# Patient Record
Sex: FEMALE | Race: WHITE | Employment: UNEMPLOYED | URBAN - METROPOLITAN AREA
[De-identification: names, ages, dates, MRNs, and addresses within clinical notes are randomized per-mention and may not be internally consistent; named-entity substitution may affect disease eponyms.]

---

## 2017-01-18 ENCOUNTER — HOSPITAL ENCOUNTER (EMERGENCY)
Age: 52
Discharge: HOME | End: 2017-01-19
Attending: EMERGENCY MEDICINE
Payer: SUBSIDIZED

## 2017-01-18 DIAGNOSIS — F10.920 ALCOHOL INTOXICATION, UNCOMPLICATED (HCC): Primary | ICD-10-CM

## 2017-01-18 PROCEDURE — 99283 EMERGENCY DEPT VISIT LOW MDM: CPT

## 2017-01-19 VITALS
DIASTOLIC BLOOD PRESSURE: 82 MMHG | OXYGEN SATURATION: 96 % | RESPIRATION RATE: 18 BRPM | SYSTOLIC BLOOD PRESSURE: 125 MMHG | TEMPERATURE: 98.2 F | HEART RATE: 90 BPM

## 2017-01-19 NOTE — ED NOTES
Pt provided a healthy choice meal, jello, william mist, crackers, and peanut butter at this time per request. Pt sitting up alert eating in no apparent distress.

## 2017-01-19 NOTE — ED PROVIDER NOTES
HPI Comments: 48yoF to ED for alcohol intoxication. Per EMS personnel, pt presented to a shelter intoxicated. She is currently sleeping, awakens to voice but goes back to sleep. No history able to be obtained at this time due to alcohol intoxication. Patient is a 45 y.o. female presenting with intoxication. The history is provided by the patient. Alcohol intoxication   Primary symptoms include: intoxication. Past Medical History:   Diagnosis Date    Hypertension     Other ill-defined conditions(799.89)      high cholesterol    Psychiatric disorder        History reviewed. No pertinent past surgical history. Family History:   Problem Relation Age of Onset    Stroke Mother     Diabetes Mother     Hypertension Father     Diabetes Father     Cancer Paternal Uncle        Social History     Social History    Marital status:      Spouse name: N/A    Number of children: N/A    Years of education: N/A     Occupational History    Not on file. Social History Main Topics    Smoking status: Never Smoker    Smokeless tobacco: Never Used    Alcohol use Yes    Drug use: No    Sexual activity: No     Other Topics Concern    Not on file     Social History Narrative         ALLERGIES: Sulfa (sulfonamide antibiotics)    Review of Systems   Unable to perform ROS: Acuity of condition   acute alcohol intoxication    Vitals:    01/18/17 2001   BP: 137/86   Pulse: 79   Resp: 18   Temp: 97.1 °F (36.2 °C)   SpO2: 97%            Physical Exam   Constitutional: Vital signs are normal. She appears well-developed and well-nourished. She is sleeping. She is easily aroused. No distress. Sleeping soundly, awakens to voice; +strong ETOH smell. HENT:   Head: Normocephalic and atraumatic. Right Ear: External ear normal.   Left Ear: External ear normal.   Nose: Nose normal.   Eyes: Conjunctivae are normal.   Cardiovascular: Normal rate and regular rhythm.     Pulmonary/Chest: Effort normal and breath sounds normal.   Abdominal: Soft. Musculoskeletal: Normal range of motion. Neurological: She is easily aroused. Skin: Skin is warm and dry. No rash noted. Psychiatric: She has a normal mood and affect. MDM  Number of Diagnoses or Management Options  Alcohol intoxication, uncomplicated [facility]):   Diagnosis management comments: Pt intoxicated per EMS staff. Sleeping, responds to voice but goes back to sleep w/o talking. Will re-eval at a later time. BRIAN Reyes 10:36 PM  12:34 AM Pt continues to sleep, no distress. 1:54 AM Pt continues to sleep, no distress noted. 3:03 AM Pt ambulatory to the restroom with minimal assistance. Gait is steady. 3:27 AM Pt is alert, oriented. Ambulatory with steady gait. Stable for d/c when ride is here.     Risk of Complications, Morbidity, and/or Mortality  Presenting problems: moderate  Diagnostic procedures: minimal  Management options: minimal    Patient Progress  Patient progress: improved    ED Course       Procedures

## 2017-01-19 NOTE — ED TRIAGE NOTES
Patient received from EMS who were called to the local shelter by the police after this patient presented to the shelter but appears to be intoxicated, patient denies drinking but has a strong odor of alcohol on breath and is unable to ambulate with a steady gait she knows her name and date of birth.

## 2017-01-19 NOTE — ED NOTES
Patient apparently intoxicated and is a bit difficult to get information from she is asleep but easily awakened and easily aggitated by questions will review information provided when patient is less intoxicated.

## 2017-02-09 DIAGNOSIS — I10 ESSENTIAL HYPERTENSION: Primary | ICD-10-CM

## 2017-02-16 ENCOUNTER — TELEPHONE (OUTPATIENT)
Dept: FAMILY MEDICINE CLINIC | Age: 52
End: 2017-02-16

## 2017-02-16 ENCOUNTER — HOSPITAL ENCOUNTER (OUTPATIENT)
Dept: LAB | Age: 52
Discharge: HOME | End: 2017-02-16

## 2017-02-16 ENCOUNTER — LAB ONLY (OUTPATIENT)
Dept: FAMILY MEDICINE CLINIC | Age: 52
End: 2017-02-16

## 2017-02-16 DIAGNOSIS — I10 ESSENTIAL HYPERTENSION: Primary | ICD-10-CM

## 2017-02-16 DIAGNOSIS — I10 ESSENTIAL HYPERTENSION: ICD-10-CM

## 2017-02-16 LAB
ALBUMIN SERPL BCP-MCNC: 4 G/DL (ref 3.4–5)
ALBUMIN/GLOB SERPL: 1 {RATIO} (ref 0.8–1.7)
ALP SERPL-CCNC: 151 U/L (ref 45–117)
ALT SERPL-CCNC: 109 U/L (ref 13–56)
ANION GAP BLD CALC-SCNC: 10 MMOL/L (ref 3–18)
AST SERPL W P-5'-P-CCNC: 51 U/L (ref 15–37)
BILIRUB SERPL-MCNC: 0.4 MG/DL (ref 0.2–1)
BUN SERPL-MCNC: 10 MG/DL (ref 7–18)
BUN/CREAT SERPL: 13 (ref 12–20)
CALCIUM SERPL-MCNC: 9.4 MG/DL (ref 8.5–10.1)
CHLORIDE SERPL-SCNC: 102 MMOL/L (ref 100–108)
CHOLEST SERPL-MCNC: 182 MG/DL
CO2 SERPL-SCNC: 26 MMOL/L (ref 21–32)
CREAT SERPL-MCNC: 0.77 MG/DL (ref 0.6–1.3)
GLOBULIN SER CALC-MCNC: 4.1 G/DL (ref 2–4)
GLUCOSE SERPL-MCNC: 94 MG/DL (ref 74–99)
HDLC SERPL-MCNC: 68 MG/DL (ref 40–60)
HDLC SERPL: 2.7 {RATIO} (ref 0–5)
LDLC SERPL CALC-MCNC: 100.4 MG/DL (ref 0–100)
LIPID PROFILE,FLP: ABNORMAL
POTASSIUM SERPL-SCNC: 4.1 MMOL/L (ref 3.5–5.5)
PROT SERPL-MCNC: 8.1 G/DL (ref 6.4–8.2)
SODIUM SERPL-SCNC: 138 MMOL/L (ref 136–145)
TRIGL SERPL-MCNC: 68 MG/DL (ref ?–150)
VLDLC SERPL CALC-MCNC: 13.6 MG/DL

## 2017-02-16 PROCEDURE — 80053 COMPREHEN METABOLIC PANEL: CPT | Performed by: NURSE PRACTITIONER

## 2017-02-16 PROCEDURE — 80061 LIPID PANEL: CPT | Performed by: NURSE PRACTITIONER

## 2017-02-16 NOTE — TELEPHONE ENCOUNTER
Medication: Lovaza 1gm, dose: 2 caps, how often: BID, current number of medication days provided: 90, refill per application. Lot #: [id number], EXP 02/2018. This medication was received and verified for the following 1. Correct Patient, 2. Correct Diagnosis, 3. Correct Drug, 4. Correct route, and no current allergy to medication. Please contact patient to come  their medications.      Estephanie Espinoza MSN, RN, [facility]

## 2017-02-16 NOTE — PROGRESS NOTES
Patient name, date of birth and orders verified before specimen collection. Left arm is dry and intact. 23g butterfly  was utilized to collect specimen. Pt tolerated procedure well.

## 2017-02-20 ENCOUNTER — OFFICE VISIT (OUTPATIENT)
Dept: FAMILY MEDICINE CLINIC | Age: 52
End: 2017-02-20

## 2017-02-20 VITALS
HEART RATE: 100 BPM | WEIGHT: 171.6 LBS | HEIGHT: 61 IN | OXYGEN SATURATION: 99 % | RESPIRATION RATE: 20 BRPM | TEMPERATURE: 98.2 F | DIASTOLIC BLOOD PRESSURE: 83 MMHG | SYSTOLIC BLOOD PRESSURE: 136 MMHG | BODY MASS INDEX: 32.4 KG/M2

## 2017-02-20 DIAGNOSIS — E78.5 HYPERLIPIDEMIA LDL GOAL <130: ICD-10-CM

## 2017-02-20 DIAGNOSIS — F10.20 ALCOHOL USE DISORDER, SEVERE, DEPENDENCE (HCC): ICD-10-CM

## 2017-02-20 DIAGNOSIS — Z12.39 BREAST CANCER SCREENING: ICD-10-CM

## 2017-02-20 DIAGNOSIS — F41.9 ANXIETY: Primary | ICD-10-CM

## 2017-02-20 DIAGNOSIS — I10 ESSENTIAL HYPERTENSION: Primary | ICD-10-CM

## 2017-02-20 DIAGNOSIS — E66.9 OBESITY (BMI 30-39.9): ICD-10-CM

## 2017-02-20 DIAGNOSIS — R79.89 ELEVATED LFTS: ICD-10-CM

## 2017-02-20 DIAGNOSIS — Z00.00 ROUTINE HEALTH MAINTENANCE: ICD-10-CM

## 2017-02-20 DIAGNOSIS — F51.01 PRIMARY INSOMNIA: ICD-10-CM

## 2017-02-20 DIAGNOSIS — M79.89 LEG SWELLING: ICD-10-CM

## 2017-02-20 RX ORDER — TRAZODONE HYDROCHLORIDE 100 MG/1
100 TABLET ORAL
Qty: 30 TAB | Refills: 1 | Status: SHIPPED | OUTPATIENT
Start: 2017-02-20 | End: 2019-10-09

## 2017-02-20 RX ORDER — HYDROCHLOROTHIAZIDE 25 MG/1
25 TABLET ORAL DAILY
Qty: 30 TAB | Refills: 3 | Status: SHIPPED | OUTPATIENT
Start: 2017-02-20 | End: 2019-07-19

## 2017-02-20 NOTE — PATIENT INSTRUCTIONS
Anxiety Disorder: Care Instructions  Your Care Instructions  Anxiety is a normal reaction to stress. Difficult situations can cause you to have symptoms such as sweaty palms and a nervous feeling. In an anxiety disorder, the symptoms are far more severe. Constant worry, muscle tension, trouble sleeping, nausea and diarrhea, and other symptoms can make normal daily activities difficult or impossible. These symptoms may occur for no reason, and they can affect your work, school, or social life. Medicines, counseling, and self-care can all help. Follow-up care is a key part of your treatment and safety. Be sure to make and go to all appointments, and call your doctor if you are having problems. It's also a good idea to know your test results and keep a list of the medicines you take. How can you care for yourself at home? · Take medicines exactly as directed. Call your doctor if you think you are having a problem with your medicine. · Go to your counseling sessions and follow-up appointments. · Recognize and accept your anxiety. Then, when you are in a situation that makes you anxious, say to yourself, \"This is not an emergency. I feel uncomfortable, but I am not in danger. I can keep going even if I feel anxious. \"  · Be kind to your body:  ¨ Relieve tension with exercise or a massage. ¨ Get enough rest.  ¨ Avoid alcohol, caffeine, nicotine, and illegal drugs. They can increase your anxiety level and cause sleep problems. ¨ Learn and do relaxation techniques. See below for more about these techniques. · Engage your mind. Get out and do something you enjoy. Go to a funny movie, or take a walk or hike. Plan your day. Having too much or too little to do can make you anxious. · Keep a record of your symptoms. Discuss your fears with a good friend or family member, or join a support group for people with similar problems. Talking to others sometimes relieves stress.   · Get involved in social groups, or volunteer to help others. Being alone sometimes makes things seem worse than they are. · Get at least 30 minutes of exercise on most days of the week to relieve stress. Walking is a good choice. You also may want to do other activities, such as running, swimming, cycling, or playing tennis or team sports. Relaxation techniques  Do relaxation exercises 10 to 20 minutes a day. You can play soothing, relaxing music while you do them, if you wish. · Tell others in your house that you are going to do your relaxation exercises. Ask them not to disturb you. · Find a comfortable place, away from all distractions and noise. · Lie down on your back, or sit with your back straight. · Focus on your breathing. Make it slow and steady. · Breathe in through your nose. Breathe out through either your nose or mouth. · Breathe deeply, filling up the area between your navel and your rib cage. Breathe so that your belly goes up and down. · Do not hold your breath. · Breathe like this for 5 to 10 minutes. Notice the feeling of calmness throughout your whole body. As you continue to breathe slowly and deeply, relax by doing the following for another 5 to 10 minutes:  · Tighten and relax each muscle group in your body. You can begin at your toes and work your way up to your head. · Imagine your muscle groups relaxing and becoming heavy. · Empty your mind of all thoughts. · Let yourself relax more and more deeply. · Become aware of the state of calmness that surrounds you. · When your relaxation time is over, you can bring yourself back to alertness by moving your fingers and toes and then your hands and feet and then stretching and moving your entire body. Sometimes people fall asleep during relaxation, but they usually wake up shortly afterward. · Always give yourself time to return to full alertness before you drive a car or do anything that might cause an accident if you are not fully alert.  Never play a relaxation tape while you drive a car. When should you call for help? Call 911 anytime you think you may need emergency care. For example, call if:  · You feel you cannot stop from hurting yourself or someone else. Keep the numbers for these national suicide hotlines: 0-146-009-TALK (5-273.610.1675) and 9-128-FXUMBYN (0-173.931.3220). If you or someone you know talks about suicide or feeling hopeless, get help right away. Watch closely for changes in your health, and be sure to contact your doctor if:  · You have anxiety or fear that affects your life. · You have symptoms of anxiety that are new or different from those you had before. Where can you learn more? Go to http://roxyAiMeiWeibritta.info/. Enter P754 in the search box to learn more about \"Anxiety Disorder: Care Instructions. \"  Current as of: July 26, 2016  Content Version: 11.1  © 5330-1071 [employer]. Care instructions adapted under license by [employer] (which disclaims liability or warranty for this information). If you have questions about a medical condition or this instruction, always ask your healthcare professional. [place] [address] any warranty or liability for your use of this information. Insomnia: Care Instructions  Your Care Instructions  Insomnia is the inability to sleep well. It is a common problem for most people at some time. Insomnia may make it hard for you to get to sleep, stay asleep, or sleep as long as you need to. This can make you tired and grouchy during the day. It can also make you forgetful, less effective at work, and unhappy. Insomnia can be caused by conditions such as depression or anxiety. Pain can also affect your ability to sleep. When these problems are solved, the insomnia usually clears up. But sometimes bad sleep habits can cause insomnia. If insomnia is affecting your work or your enjoyment of life, you can take steps to improve your sleep.   Follow-up care is a key part of your treatment and safety. Be sure to make and go to all appointments, and call your doctor if you are having problems. It's also a good idea to know your test results and keep a list of the medicines you take. How can you care for yourself at home? What to avoid  · Do not have drinks with caffeine, such as coffee or black tea, for 8 hours before bed. · Do not smoke or use other types of tobacco near bedtime. Nicotine is a stimulant and can keep you awake. · Avoid drinking alcohol late in the evening, because it can cause you to wake in the middle of the night. · Do not eat a big meal close to bedtime. If you are hungry, eat a light snack. · Do not drink a lot of water close to bedtime, because the need to urinate may wake you up during the night. · Do not read or watch TV in bed. Use the bed only for sleeping and sexual activity. What to try  · Go to bed at the same time every night, and wake up at the same time every morning. Do not take naps during the day. · Keep your bedroom quiet, dark, and cool. · Sleep on a comfortable pillow and mattress. · If watching the clock makes you anxious, turn it facing away from you so you cannot see the time. · If you worry when you lie down, start a worry book. Well before bedtime, write down your worries, and then set the book and your concerns aside. · Try meditation or other relaxation techniques before you go to bed. · If you cannot fall asleep, get up and go to another room until you feel sleepy. Do something relaxing. Repeat your bedtime routine before you go to bed again. · Make your house quiet and calm about an hour before bedtime. Turn down the lights, turn off the TV, log off the computer, and turn down the volume on music. This can help you relax after a busy day. When should you call for help?   Watch closely for changes in your health, and be sure to contact your doctor if:  · Your efforts to improve your sleep do not work.  · Your insomnia gets worse. · You have been feeling down, depressed, or hopeless or have lost interest in things that you usually enjoy. Where can you learn more? Go to http://roxy-britta.info/. Enter P513 in the search box to learn more about \"Insomnia: Care Instructions. \"  Current as of: July 26, 2016  Content Version: 11.1  © 4932-9540 [employer]. Care instructions adapted under license by [employer] (which disclaims liability or warranty for this information). If you have questions about a medical condition or this instruction, always ask your healthcare professional. [place] [address] any warranty or liability for your use of this information.

## 2017-02-20 NOTE — PATIENT INSTRUCTIONS
The [facility] Emergency Department reminders! Foundation Operating Hours: These may change without notice. Mon- Wed 7am to 5pm. Closed for lunch 12-1pm  Thurs 7am to 12pm  Fridays closed     NO SHOW POLICY ~ If a patient has 6 no shows for an appointment with the Provider, Mental Health Provider, or the Nurse Navigator in 6 months, they will be discharged from the practice for 6 months. Medication ordering will also be suspended. If the patient is discharged from the [facility], they can go to the [place] [address] where they can be seen for the primary needs plus obtain the same types of medications as they receive at the [facility]. To avoid being discharged, the patient must call the office at [phone] 24 hours prior to their appointment if they need to cancel, arrive to their appointments on time and come to all scheduled appointments. If the patient is discharged from the practice, they can apply to be re-established after 12 months. Lab work:  Unless you are instructed differently, please return to the office between the hours of 7 am and 10:30 am Monday through Thursday to have your labs drawn one week before your next scheduled PROVIDER visit. If you do not have an appointment to follow up on these results, please make one or plan to call the office if you do not hear from us to get the results. No news does not mean good news. Medications: If your medications are new or have changed, and you get your medications from the clinic pharmacy ([facility]), you MUST talk to the pharmacy staff to sign the new prescription applications. If you don't sign the applications we cannot get the medications for you. It usually takes 6-8 weeks for your medications to arrive. The Pharmacy staff will call you when your medications are available. You will have 30 days to come in and  your medications.  If you don't  your medicines within those 30 days, those medicines will be placed on the self as samples and you will have to start all over again by completing the applications and waiting the 6-8 weeks for your medicines to arrive. This is firm and there will be no exceptions! ! The [facility] or [facility] will assist you with your medications if available but not all medications are available so some may be obtained through local pharmacies such as [facility] that has a large $4 list and [facility] that has many drugs for free or also for $4.  We will work hard to get the best medications for you that you can also afford. Feet Care: Local [facility] through [facility]  Every second Tuesday of the month (except for holidays and election days) from 9am to 1 pm. The services provided by these ministry volunteers are free of charge with the option to donate. They will inspect your feet thoroughly, soak them for 10 minutes, cut and file your nails. They care for diabetics as well. Keep in mind this service is free and will be on a first come first serve basis. Bad teeth? Ask about the Dental Bus to get you in front of a local dentist. The bus leaves every other Wednesday for those on the list. (Ask about availability as these appointments are limited)    Eye exams for Diabetics. Please let us know so we can add you to the list to see the eye doctor at [facility]. You will receive a free eye exam and free glasses if needed. Unfortunately, if you are not a diabetic, we do not have a free service for eye exams for you (yet!). We do have information on where to go to get a huge discount on eye exams and glasses. Sick visits: If you are sick and it is not an emergency call the office to see if you can schedule an appointment. Charges and cost items from the clinic:  Most of our orders are covered by [employer] [place] [address] [place] but there ARE SOME CHARGES for items such as radiology interpretations and anesthesiology during procedures and surgeries.   Please make sure you have contacted the Advanced Patient Advocacy (APA) group to check on your payment options: [email or web address]. or come in and talk to them in person: On  Tuesdays, we have Advanced Patient [employer]ocacy is available Mon - Fri 8-4pm [facility] first floor by the information desk. Their number is [phone]. It is important that you are screened in order to qualify for assistance and to avoid huge medical charges. The [facility] Emergency Department is not responsible for ANY charges you may accrue regardless of who ordered the medication, procedure, treatment or test. If you go to the Emergency Room, you WILL be charged! Behavior and emotional issues! It is stressful to be sick, have an illness, take medications, not have a job, not have medical insurance, have family issues or just getting older! Schedule an appointment with our mental health provider. She is in the office Mondays and Wednesdays from 8am to [address] [facility] can also contact the following: The national suicide hotline (8-486-064-XWGB or 9-742.532.4687)    [address], [address] [place] [address]  [phone]    [facility] ([facility]) - [address] [place] [address], [address] [place], [place] [phone]            Immunizations/Vaccination  Routine Immunizations are provided for infants, children, teens, and adults at the [facility]. Please bring all immunization records with you and present them at the time of registration. Phone [phone] extension [phone]  Hours (Walk in)  Monday, Tuesday, Wednesday and Friday  8:00 AM to 11:00 AM  12:30 PM to 3:00 PM      Drug and Alcohol Addiction Issues! It is hard to stop a poor habit but there is help out there. Please feel free to attend any other the following support groups to help you kick the habit or go to [facility] Emergency Department to be evaluated by the psychiatric team. Never give up!!     Alexsandra meetings: Alexx elliott, [facility], [facility] MONDAY 7:30 PM JUST FOR TODAY [facility] [address]     [facility] WEDNESDAY 10:30 AM [employer]GIN[employer] Al-Dianne GEOFFREY [facility], ROOM [address] [place] [address] WEDNESDAY 8:00  .[place] [address], [place] 8:00 PM KEEP IT SIMPLE AFG Al-Sebastiann  [facility] 1 JASMEET ROBERT     [facility] THURSDAY 8:00 PM LET IT BEGIN WITH ME AFG [facility] Jose Armando [place] [address] 6;30 PM [place] [employer] [address]      [place] MONDAY 10:30 AM [employer] [employer] Al-Dianne [facility] [address] [facility] SATURDAY 8:00 PM [facility] SATURDAY NIGHT AFG Al-Anoradha [facility] [address] [facility] MONDAY 7:00 PM MONDAY NIGHT AFG Al-Sebastiann JENNIFER [facility] [address]     [place] WEDNESDAY 8:00 PM SERENITY SEEKERS [employer] Al-Sebastiann [facility] [address] [facility]    Niacin (By mouth)   Niacin (SONIA-a-sin)  Treats high cholesterol and triglyceride levels and niacin deficiency. Also reduces heart attack risk and slows narrowing of the arteries. Brand Name(s):[facility] Niacin 250, [facility] Niacinamide 100, Replaced by [facility] Natural Niacin, Nature's Blend Niacin, Niacor, Niaspan, PharmAssure Niacin, [employer] Niacin, [employer] Niacin 500, Slo-Niacin   There may be other brand names for this medicine. When This Medicine Should Not Be Used: This medicine is not right for everyone. Do not use it if you had an allergic reaction to niacin. How to Use This Medicine:   Capsule, Long Acting Capsule, Liquid, Tablet, Long Acting Tablet  · Take your medicine as directed. Your dose may need to be changed several times to find what works best for you. · Take this medicine at bedtime with a low-fat snack. This will help decrease stomach upset.   · Follow the instructions on the medicine label if you are using this medicine without a prescription. · Measure the oral liquid medicine with a marked measuring spoon, oral syringe, or medicine cup. · Extended-release tablet or capsule: Swallow whole. Do not crush, break, or chew it. · Read and follow the patient instructions that come with this medicine. Talk to your doctor or pharmacist if you have any questions. · Missed dose: Take a dose as soon as you remember. If it is almost time for your next dose, wait until then and take a regular dose. Do not take extra medicine to make up for a missed dose. · Store the medicine in a closed container at room temperature, away from heat, moisture, and direct light. Drugs and Foods to Avoid:   Ask your doctor or pharmacist before using any other medicine, including over-the-counter medicines, vitamins, and herbal products. · Some foods and medicines can affect how niacin works. Tell your doctor if you are using any of the following:  ¨ Blood pressure medicine  ¨ Blood thinner (including warfarin)  ¨ Statin medicine  ¨ Vitamins or other supplements that contain niacin  · If you are also using cholestyramine, colesevelam, or colestipol, take niacin at least 4 to 6 hours after you take these medicines. · Avoid hot drinks, alcohol, and spicy foods around the time that you take niacin. This will decrease flushing. Warnings While Using This Medicine:   · Tell your doctor if you are pregnant or breastfeeding, or if you have liver disease, kidney disease, diabetes, gout, heart disease, angina, low blood pressure, thyroid problems, bleeding problems, or stomach ulcers. · This medicine may cause the following problems:  ¨ Liver problems  ¨ Rhabdomyolysis (serious muscle problem when used with statin medicine)  ¨ High blood sugar levels  · This medicine may make you dizzy. Do not drive or do anything else that could be dangerous until you know how this medicine affects you. Stand or sit up slowly.   · If you need to stop taking extended-release niacin, even for a short time, talk to your doctor before you start taking it again. You may need to start back on a lower dose. · Tell any doctor or dentist who treats you that you are using this medicine. This medicine may affect certain medical test results. · Your doctor will do lab tests at regular visits to check on the effects of this medicine. Keep all appointments. · Keep all medicine out of the reach of children. Never share your medicine with anyone. Possible Side Effects While Using This Medicine:   Call your doctor right away if you notice any of these side effects:  · Allergic reaction: Itching or hives, swelling in your face or hands, swelling or tingling in your mouth or throat, chest tightness, trouble breathing  · Dark urine or pale stools, nausea, vomiting, loss of appetite, stomach pain, yellow skin or eyes  · Muscle pain, tenderness, or weakness  · Unusual bleeding or bruising  If you notice these less serious side effects, talk with your doctor:   · Mild nausea or vomiting, diarrhea  · Warmth or redness in your face, neck, arms, or upper chest  If you notice other side effects that you think are caused by this medicine, tell your doctor. Call your doctor for medical advice about side effects. You may report side effects to FDA at 1-649-FDA-1199  © 2016 [address] is for End User's use only and may not be sold, redistributed or otherwise used for commercial purposes. The above information is an  only. It is not intended as medical advice for individual conditions or treatments. Talk to your doctor, nurse or pharmacist before following any medical regimen to see if it is safe and effective for you.

## 2017-02-20 NOTE — MR AVS SNAPSHOT
Visit Information Date & Time Provider Department Dept. Phone Encounter #  
 2/20/2017  2:30 PM Luli Catalan NP [address] [id number] Follow-up Instructions Return in about 3 months (around 5/20/2017), or if symptoms worsen or fail to improve. Upcoming Health Maintenance Date Due Hepatitis C Screening 1965 DTaP/Tdap/Td series (1 - Tdap) 10/2/1985 PAP AKA CERVICAL CYTOLOGY 10/2/1985 FOBT Q 1 YEAR AGE 50-75 10/2/2014 BREAST CANCER SCRN MAMMOGRAM 7/23/2016 INFLUENZA AGE 9 TO ADULT 8/1/2016 Pneumococcal 19-64 Medium Risk (1 of 1 - PPSV23) 11/8/2017* *Topic was postponed. The date shown is not the original due date. Allergies as of 2/20/2017  Review Complete On: 2/20/2017 By: Luli Catalan NP Severity Noted Reaction Type Reactions Sulfa (Sulfonamide Antibiotics)  12/10/2012    Rash Current Immunizations  Never Reviewed No immunizations on file. Not reviewed this visit You Were Diagnosed With   
  
 Codes Comments Essential hypertension    -  Primary ICD-10-CM: I10 
ICD-9-CM: 401.9 Alcohol use disorder, severe, dependence ([facility]     ICD-10-CM: F10.20 ICD-9-CM: 303.90 Elevated LFTs     ICD-10-CM: R79.89 ICD-9-CM: 790.6 Breast cancer screening     ICD-10-CM: Z12.39 
ICD-9-CM: V76.10 Routine health maintenance     ICD-10-CM: Z00.00 ICD-9-CM: V70.0 Leg swelling     ICD-10-CM: M79.89 ICD-9-CM: 729.81 Vitals BP Pulse Temp Resp Height(growth percentile) Weight(growth percentile) 136/83 100 98.2 °F (36.8 °C) 20 5' 1\" (1.549 m) 171 lb 9.6 oz (77.8 kg) SpO2 BMI OB Status Smoking Status 99% 32.42 kg/m2 Menopause Never Smoker Vitals History BMI and BSA Data Body Mass Index Body Surface Area  
 32.42 kg/m 2 1.83 m 2 Preferred Pharmacy Pharmacy Name Phone [facility] [address] [phone] Your Updated Medication List  
  
   
This list is accurate as of: 2/20/17  3:02 PM.  Always use your most recent med list.  
  
  
  
  
 hydroCHLOROthiazide 25 mg tablet Commonly known as:  HYDRODIURIL Take 1 Tab by mouth daily. Indications: Edema, hypertension  
  
 multivitamin tablet Commonly known as:  ONE A DAY Take 1 Tab by mouth daily. omega-3 acid ethyl esters 1 gram capsule Commonly known as:  Amber Chin Take 2 Caps by mouth two (2) times a day. traZODone 100 mg tablet Commonly known as:  Debbe Gunner Take 1 Tab by mouth nightly. Prescriptions Sent to Pharmacy Refills  
 hydroCHLOROthiazide (HYDRODIURIL) 25 mg tablet 3 Sig: Take 1 Tab by mouth daily. Indications: Edema, hypertension Class: Normal  
 Pharmacy: [address]  #: [phone] Route: Oral  
  
Follow-up Instructions Return in about 3 months (around 5/20/2017), or if symptoms worsen or fail to improve. Patient Instructions [facility] Emergency Department reminders! Foundation Operating Hours: These may change without notice. Mon- Wed 7am to 5pm. Closed for lunch 12-1pm 
Thurs 7am to 12pm 
Fridays closed NO SHOW POLICY ~ If a patient has 6 no shows for an appointment with the Provider, Mental Health Provider, or the Nurse Navigator in 6 months, they will be discharged from the practice for 6 months. Medication ordering will also be suspended. If the patient is discharged from the [facility], they can go to the [place] [address] where they can be seen for the primary needs plus obtain the same types of medications as they receive at the [facility]. To avoid being discharged, the patient must call the office at [phone] 24 hours prior to their appointment if they need to cancel, arrive to their appointments on time and come to all scheduled appointments. If the patient is discharged from the practice, they can apply to be re-established after 12 months. Lab work:  Unless you are instructed differently, please return to the office between the hours of 7 am and 10:30 am Monday through Thursday to have your labs drawn one week before your next scheduled PROVIDER visit. If you do not have an appointment to follow up on these results, please make one or plan to call the office if you do not hear from us to get the results. No news does not mean good news. Medications: If your medications are new or have changed, and you get your medications from the clinic pharmacy ([facility]), you MUST talk to the pharmacy staff to sign the new prescription applications. If you don't sign the applications we cannot get the medications for you. It usually takes 6-8 weeks for your medications to arrive. The Pharmacy staff will call you when your medications are available. You will have 30 days to come in and  your medications. If you don't  your medicines within those 30 days, those medicines will be placed on the self as samples and you will have to start all over again by completing the applications and waiting the 6-8 weeks for your medicines to arrive. This is firm and there will be no exceptions! ! The [facility] or [facility] will assist you with your medications if available but not all medications are available so some may be obtained through local pharmacies such as [facility] that has a large $4 list and [facility] that has many drugs for free or also for $4.  We will work hard to get the best medications for you that you can also afford. Feet Care: Local ministry through [facility] Every second Tuesday of the month (except for holidays and election days) from 9am to 1 pm. The services provided by these ministry volunteers are free of charge with the option to donate.  They will inspect your feet thoroughly, soak them for 10 minutes, cut and file your nails. They care for diabetics as well. Keep in mind this service is free and will be on a first come first serve basis. Bad teeth? Ask about the Dental Bus to get you in front of a local dentist. The bus leaves every other Wednesday for those on the list. (Ask about availability as these appointments are limited) Eye exams for Diabetics. Please let us know so we can add you to the list to see the eye doctor at [facility]. You will receive a free eye exam and free glasses if needed. Unfortunately, if you are not a diabetic, we do not have a free service for eye exams for you (yet!). We do have information on where to go to get a huge discount on eye exams and glasses. Sick visits: If you are sick and it is not an emergency call the office to see if you can schedule an appointment. Charges and cost items from the clinic:  Most of our orders are covered by [employer] [place] but there ARE SOME CHARGES for items such as radiology interpretations and anesthesiology during procedures and surgeries. Please make sure you have contacted the Advanced Patient Advocacy (APA) group to check on your payment options: [email or web address]. or come in and talk to them in person: On 
Tuesdays, we have Advanced Patient Advocacy is available Mon - Fri 8-4pm at [facility] on the first floor by the information desk. Their number is [phone]. It is important that you are screened in order to qualify for assistance and to avoid huge medical charges. The [facility] is not responsible for ANY charges you may accrue regardless of who ordered the medication, procedure, treatment or test. If you go to the Emergency Room, you WILL be charged! Behavior and emotional issues! It is stressful to be sick, have an illness, take medications, not have a job, not have medical insurance, have family issues or just getting older!   Schedule an appointment with our mental health provider. She is in the office Mondays and Wednesdays from 8am to [place] [address] can also contact the following: The national suicide hotline (8-702-243-VIMK or 7-721.941.9225) [address] [facility] [address] [facility], [address] [facility] 
[phone] Community Services Board (CSB) [facility] 14[address] [facility], [address] Jayson Paiz 
[phone] Immunizations/Vaccination Routine Immunizations are provided for infants, children, teens, and adults at the [facility]. Please bring all immunization records with you and present them at the time of registration. Phone [phone] Hours (Walk in) Monday, Tuesday, Wednesday and Friday 8:00 AM to 11:00 AM 
12:30 PM to 3:00 PM 
 
 
Drug and Alcohol Addiction Issues! It is hard to stop a poor habit but there is help out there. Please feel free to attend any other the following support groups to help you kick the habit or go to [facility] Department to be evaluated by the psychiatric team. Never give up!! AlAnon meetings: Rudi Merdia, Levi silveira [place] MONDAY 7:30 PM JUST FOR TODAY [employer] [facility] [address] [facility]EDN[facility] 10:30 AM [employer] Al-Anoradha [place] [facility], ROOM [address] [place] [address]  
 
[facility] WEDNES[facility] 8:00 PM Mesha Slaughter [facility] THURSDAY 8:00 PM KEEP IT SIMPLE [employer] [facility] [address] 8:00 PM LET IT BEGIN WITH ME [employer] Al-Anoradha [facility] [address] [facility] FRIDAY 6;30 PM [place] PARENTS [employer] [place] [facility] [address] [facility] [address] [place] MONDAY 10:30  [place] [address] [place] SATURDAY 8:00 PM [facility] SATUR[facility] NIGHT [employer] Al-Anoradha [facility] [address] [place] MONDAY 7:00 PM MONDAY NIGHT AFG Darrell [facility]ENN[facility] [address]  
 
[facility] WEDNESDAY 8:00 PM SERENITY SEEKERS [employer] Darrell [facility] [address] [facility] 
 
Niacin (By mouth) Niacin (SONIA-a-sin) Treats high cholesterol and triglyceride levels and niacin deficiency. Also reduces heart attack risk and slows narrowing of the arteries. Brand Name(s):[facility] Niacin 250, [facility] Niacinamide 100, [facility] Natural Niacin, Nature's Blend Niacin, Niacor, Niaspan, PharmAssure Niacin, [facility] Niacin, [facility] Niacin 500, Slo-Niacin There may be other brand names for this medicine. When This Medicine Should Not Be Used: This medicine is not right for everyone. Do not use it if you had an allergic reaction to niacin. How to Use This Medicine:  
Capsule, Long Acting Capsule, Liquid, Tablet, Long Acting Tablet · Take your medicine as directed. Your dose may need to be changed several times to find what works best for you. · Take this medicine at bedtime with a low-fat snack. This will help decrease stomach upset. · Follow the instructions on the medicine label if you are using this medicine without a prescription. · Measure the oral liquid medicine with a marked measuring spoon, oral syringe, or medicine cup. · Extended-release tablet or capsule: Swallow whole. Do not crush, break, or chew it. · Read and follow the patient instructions that come with this medicine. Talk to your doctor or pharmacist if you have any questions. · Missed dose: Take a dose as soon as you remember. If it is almost time for your next dose, wait until then and take a regular dose. Do not take extra medicine to make up for a missed dose. · Store the medicine in a closed container at room temperature, away from heat, moisture, and direct light. Drugs and Foods to Avoid: Ask your doctor or pharmacist before using any other medicine, including over-the-counter medicines, vitamins, and herbal products. · Some foods and medicines can affect how niacin works. Tell your doctor if you are using any of the following: ¨ Blood pressure medicine ¨ Blood thinner (including warfarin) ¨ Statin medicine ¨ Vitamins or other supplements that contain niacin · If you are also using cholestyramine, colesevelam, or colestipol, take niacin at least 4 to 6 hours after you take these medicines. · Avoid hot drinks, alcohol, and spicy foods around the time that you take niacin. This will decrease flushing. Warnings While Using This Medicine: · Tell your doctor if you are pregnant or breastfeeding, or if you have liver disease, kidney disease, diabetes, gout, heart disease, angina, low blood pressure, thyroid problems, bleeding problems, or stomach ulcers. · This medicine may cause the following problems: 
¨ Liver problems ¨ Rhabdomyolysis (serious muscle problem when used with statin medicine) ¨ High blood sugar levels · This medicine may make you dizzy. Do not drive or do anything else that could be dangerous until you know how this medicine affects you. Stand or sit up slowly. · If you need to stop taking extended-release niacin, even for a short time, talk to your doctor before you start taking it again. You may need to start back on a lower dose. · Tell any doctor or dentist who treats you that you are using this medicine. This medicine may affect certain medical test results. · Your doctor will do lab tests at regular visits to check on the effects of this medicine. Keep all appointments. · Keep all medicine out of the reach of children. Never share your medicine with anyone. Possible Side Effects While Using This Medicine:  
Call your doctor right away if you notice any of these side effects: · Allergic reaction: Itching or hives, swelling in your face or hands, swelling or tingling in your mouth or throat, chest tightness, trouble breathing · Dark urine or pale stools, nausea, vomiting, loss of appetite, stomach pain, yellow skin or eyes · Muscle pain, tenderness, or weakness · Unusual bleeding or bruising If you notice these less serious side effects, talk with your doctor: · Mild nausea or vomiting, diarrhea · Warmth or redness in your face, neck, arms, or upper chest 
If you notice other side effects that you think are caused by this medicine, tell your doctor. Call your doctor for medical advice about side effects. You may report side effects to FDA at 4-335-XKN-1685 © 2016 [address] is for End User's use only and may not be sold, redistributed or otherwise used for commercial purposes. The above information is an  only. It is not intended as medical advice for individual conditions or treatments. Talk to your doctor, nurse or pharmacist before following any medical regimen to see if it is safe and effective for you. Introducing [facility] [place] [facility]! Kallie Glasgow introduces [employer] patient portal. Now you can access parts of your medical record, email your doctor's office, and request medication refills online. 1. In your internet browser, go to [email or web address] 2. Click on the First Time User? Click Here link in the Sign In box. You will see the New Member Sign Up page. 3. Enter your [employer] Access Code exactly as it appears below. You will not need to use this code after youve completed the sign-up process. If you do not sign up before the expiration date, you must request a new code. · [employer] Access Code: [email or web address] Expires: 5/21/2017  1:54 PM 
 
4. Enter the last four digits of your Social Security Number (xxxx) and Date of Birth (mm/dd/yyyy) as indicated and click Submit. You will be taken to the next sign-up page. 5. Create a CmyCasa ID. This will be your C[employer] login ID and cannot be changed, so think of one that is secure and easy to remember. 6. Create a C[employer]asa password. You can change your password at any time. 7. Enter your Password Reset Question and Answer. This can be used at a later time if you forget your password. 8. Enter your e-mail address. You will receive e-mail notification when new information is available in [address] 9. Click Sign Up. You can now view and download portions of your medical record. 10. Click the Download Summary menu link to download a portable copy of your medical information. If you have questions, please visit the Frequently Asked Questions section of the C[employer]asa website. Remember, C[employer]asa is NOT to be used for urgent needs. For medical emergencies, dial 911. Now available from your iPhone and Android! Please provide this summary of care documentation to your next provider. Your primary care clinician is listed as Brigida De La Cruz. If you have any questions after today's visit, please call [phone].

## 2017-02-20 NOTE — PROGRESS NOTES
This is a follow up visit for this well developed, well nourished,   female who complains of insomnia and is requesting Adderall. Patient,as at last visit, is stating that she is unable to sleep and Adderall is the only medication that helps with her insomnia. She states that she's been given Adderall in the past and it was the only medication that worked for her. She periodically displayed rapid leg movement. She denied using alcohol or drugs at this time but has a history. States she hasn't used either in \" a good while\". States she is eating too much and gaining weight. She denies suicidal, homicidal ideation or any form of hallucinations. Trazodone 100 mg nightly was ordered. She will return in 2-4 weeks.

## 2017-02-20 NOTE — PROGRESS NOTES
DHRUV ORTIZ [facility]  [address] [facility], [address]  [phone] office/[phone] fax      2/20/2017    Reason for visit:   Chief Complaint   Patient presents with    Results    Leg Swelling     Reports leg swelling. She reports that she works at [address] and stands on her feet all day. Recently started on Caduet with Amlodipine in it. Patient: Nisreen Garber, 1965, [id number]       Primary MD: Román Moise NP    Subjective:   Nisreen Garber, a 45 y.o. female, who presents for Results and Leg Swelling (Reports leg swelling. She reports that she works at [address] and stands on her feet all day. Recently started on Caduet with Amlodipine in it. )    The patient here today. Reports that she's working and has moved out of the previous roommates house. No longer in danger (see previous note.) She is very hyperactive and shaking leg during interview. Reported history of ADHD and Adderall use. Sees Ms Jordi Jeronimo for counseling. HPI    Past Medical History   Diagnosis Date    Hypertension     Other ill-defined conditions(816.89)      high cholesterol    Psychiatric disorder        No past surgical history on file. Social History     Social History    Marital status:      Spouse name: N/A    Number of children: N/A    Years of education: N/A     Occupational History    Not on file. Social History Main Topics    Smoking status: Never Smoker    Smokeless tobacco: Never Used    Alcohol use Yes    Drug use: No    Sexual activity: No     Other Topics Concern    Not on file     Social History Narrative       Allergies   Allergen Reactions    Sulfa (Sulfonamide Antibiotics) Rash       Current Outpatient Prescriptions on File Prior to Visit   Medication Sig Dispense Refill    omega-3 acid ethyl esters (LOVAZA) 1 gram capsule Take 2 Caps by mouth two (2) times a day. 360 Cap 3    multivitamin (ONE A DAY) tablet Take 1 Tab by mouth daily.       traZODone (DESYREL) 100 mg tablet Take 1 Tab by mouth nightly. 30 Tab 1     No current facility-administered medications on file prior to visit. Review of Systems   Constitutional: Negative. HENT: Negative. Eyes: Negative. Respiratory: Negative. Cardiovascular: Positive for leg swelling. Negative for chest pain, palpitations, orthopnea and claudication. Gastrointestinal: Negative. Genitourinary: Negative. Musculoskeletal: Negative. Skin: Negative. Neurological: Negative. Endo/Heme/Allergies: Negative. Psychiatric/Behavioral: Negative for depression, hallucinations, memory loss, substance abuse and suicidal ideas. The patient is nervous/anxious and has insomnia. Objective:   Visit Vitals    /83    Pulse 100    Temp 98.2 °F (36.8 °C)    Resp 20    Ht 5' 1\" (1.549 m)    Wt 171 lb 9.6 oz (77.8 kg)    SpO2 99%    BMI 32.42 kg/m2      Wt Readings from Last 3 Encounters:   02/20/17 171 lb 9.6 oz (77.8 kg)   11/08/16 157 lb 9.6 oz (71.5 kg)   08/10/16 154 lb (69.9 kg)     Lab Results   Component Value Date/Time    Glucose 94 02/16/2017 08:43 AM         Physical Exam   Constitutional: She is oriented to person, place, and time. She appears well-developed and well-nourished. HENT:   Head: Normocephalic. Eyes: Pupils are equal, round, and reactive to light. Neck: Normal range of motion. Neck supple. No JVD present. Carotid bruit is not present. Cardiovascular: Normal rate, regular rhythm, normal heart sounds and intact distal pulses. No murmur heard. 2 + pitting edema to BLE    Pulmonary/Chest: Effort normal and breath sounds normal. No respiratory distress. Abdominal: Soft. Bowel sounds are normal.   Musculoskeletal: Normal range of motion. Neurological: She is alert and oriented to person, place, and time. She has normal reflexes. Skin: Skin is warm and dry. Psychiatric: She has a normal mood and affect.  Her speech is normal. Judgment and thought content normal. She is agitated and hyperactive. Cognition and memory are normal.   Vitals reviewed. Assessment:    Sofya Monroy who has risk factors including (see above previous medical hx) and:       ICD-10-CM ICD-9-CM    1. Essential hypertension I10 401.9 hydroCHLOROthiazide (HYDRODIURIL) 25 mg tablet      LIPID PANEL      METABOLIC PANEL, COMPREHENSIVE   2. Alcohol use disorder, severe, dependence ([facility] F10.20 303.90    3. Elevated LFTs R79.89 790.6 HEPATITIS PANEL, ACUTE   4. Breast cancer screening Z12.39 V76.10    5. Routine health maintenance Z00.00 V70.0    6. Leg swelling M79.89 729.81 hydroCHLOROthiazide (HYDRODIURIL) 25 mg tablet   7. Obesity (BMI 30-39. 9) E66.9 278.00    8. Hyperlipidemia LDL goal <130 E78.5 272.4 LIPID PANEL      METABOLIC PANEL, COMPREHENSIVE       1. Essential hypertension  -Will swithc from Caduet to HCTZ due to leg swelling and increasing LFTS  - hydroCHLOROthiazide (HYDRODIURIL) 25 mg tablet; Take 1 Tab by mouth daily. Indications: Edema, hypertension  Dispense: 30 Tab; Refill: 3  - LIPID PANEL; Future  - METABOLIC PANEL, COMPREHENSIVE; Future    2. Alcohol use disorder, severe, dependence ([facility].)  -The patient reports that she drinks maybe 2 beers a week. 3. Elevated LFTs  -Diff Dx Alcohol use, Hepatitis, Statin use   -The patient reports that she has cut back on drinking.   -Has not had Hepatitis screening. RTO this week to get labs drawn     - HEPATITIS PANEL, ACUTE; Future    4. Breast cancer screening  Refuses Mammo this year. States she will get it done next year. 5. Routine health maintenance  Last pap 2016 due 2019     6. Leg swelling  -Will start on diuretic.  -Elevate legs when not on feet. - hydroCHLOROthiazide (HYDRODIURIL) 25 mg tablet; Take 1 Tab by mouth daily. Indications: Edema, hypertension  Dispense: 30 Tab; Refill: 3    7.  Obesity (BMI 30-39.9)  -Weight management: Discussed importance of maintaining a normal weight through healthy dietary changes and aerobic exercise on a daily basis of 30 min or more to decrease need for additional medications, reduction of blood glucose/blood pressure/ dementia/osteoporosis/stress and depression. Aerobic exercise was described as an activity that makes them sweaty and elevates their heart rate such as walking, running, bike, treadmill, stair climbing, joining a gym or swimming. Discussed the patient's above normal BMI with her. Recommended the following interventions: dietary management education, guidance, counseling, exercise and monitoring weight. BMI is out of normal parameters and plan is as follows: Discussed in great detail on diet, portion control, exercise, avoiding foods high in sugar, carbs and starches, mealtimes and to eat until satisfied not til full. I have counseled this patient on diet and exercise regimens        8. Hyperlipidemia LDL goal <130  -The patient is asked to make an attempt to improve diet and exercise patterns to aid in medical management of this problem.  -Not a good statin candidate due to elevated LFTs. Will continue to monitor.   - LIPID PANEL; Future  - METABOLIC PANEL, COMPREHENSIVE; Future    Written instructions followed our verbal discussion of all information discussed above, pending tests ordered and future goals/plans. Patient expressed understanding of current diagnosis, planned testing, follow up and if needed to contact the office for any questions or concerns prior to the next visit. Plan:   Med reconciliation completed with patient. Reviewed side effects of medications with the patient. Questions were answered and patient verb understanding. Discussed lab results with patient  Will review results with pt at 2/20/17 appt   1. Triglycerides improved to 68 from 533.   2. .4   3. LFTs elevated / AST 51. The patient with history of alcohol use.  Will give info on AA    Orders Placed This Encounter    HEPATITIS PANEL, ACUTE     Standing Status:   Future     Standing Expiration Date:   10/28/2017    LIPID PANEL     Standing Status:   Future     Standing Expiration Date:   30/97/2069    METABOLIC PANEL, COMPREHENSIVE     Standing Status:   Future     Standing Expiration Date:   10/28/2017    hydroCHLOROthiazide (HYDRODIURIL) 25 mg tablet     Sig: Take 1 Tab by mouth daily. Indications: Edema, hypertension     Dispense:  30 Tab     Refill:  3     Current Outpatient Prescriptions   Medication Sig Dispense Refill    hydroCHLOROthiazide (HYDRODIURIL) 25 mg tablet Take 1 Tab by mouth daily. Indications: Edema, hypertension 30 Tab 3    omega-3 acid ethyl esters (LOVAZA) 1 gram capsule Take 2 Caps by mouth two (2) times a day. 360 Cap 3    multivitamin (ONE A DAY) tablet Take 1 Tab by mouth daily.  traZODone (DESYREL) 100 mg tablet Take 1 Tab by mouth nightly. 30 Tab 1     Medications Discontinued During This Encounter   Medication Reason    amLODIPine-atorvastatin (CADUET) 10-20 mg per tablet Duplicate Order    omega-3 acid ethyl esters (LOVAZA) 1 gram capsule Duplicate Order    amLODIPine-atorvastatin (CADUET) 10-20 mg per tablet Side Effects       Follow-up Disposition:  Return in about 3 months (around 5/20/2017), or if symptoms worsen or fail to improve. Labs needed for follow-up appt    \"No Show policy was reviewed with the patient. The services affected are the nurse navigator and the provider. No show appointments include missing labs for a future scheduled appointment, Pap/pelvics, arriving to appointment more than 10 minutes late, and calling to cancel appointment less than 24 hours in advance. After the 6th No Show, the patient will be removed from the [facility] to include medications for 6 months. The patient will be referred to the [place] [id number] for their primary care needs. \"     Chauncey Weiss, [address]      I spent 25 minutes with the patient in face-to-face consultation, of which greater than 50% was spent in counseling and coordination of care as described above.

## 2017-02-22 ENCOUNTER — TELEPHONE (OUTPATIENT)
Dept: FAMILY MEDICINE CLINIC | Age: 52
End: 2017-02-22

## 2017-05-11 ENCOUNTER — DOCUMENTATION ONLY (OUTPATIENT)
Dept: FAMILY MEDICINE CLINIC | Age: 52
End: 2017-05-11

## 2018-01-26 ENCOUNTER — HOSPITAL ENCOUNTER (EMERGENCY)
Age: 53
Discharge: HOME | End: 2018-01-27
Attending: EMERGENCY MEDICINE | Admitting: EMERGENCY MEDICINE
Payer: SELF-PAY

## 2018-01-26 DIAGNOSIS — F10.920 ALCOHOLIC INTOXICATION WITHOUT COMPLICATION (HCC): Primary | ICD-10-CM

## 2018-01-26 PROCEDURE — 99283 EMERGENCY DEPT VISIT LOW MDM: CPT

## 2018-01-27 VITALS
RESPIRATION RATE: 16 BRPM | SYSTOLIC BLOOD PRESSURE: 154 MMHG | HEART RATE: 100 BPM | TEMPERATURE: 98.3 F | DIASTOLIC BLOOD PRESSURE: 94 MMHG | OXYGEN SATURATION: 99 %

## 2018-01-27 LAB — GLUCOSE BLD STRIP.AUTO-MCNC: 104 MG/DL (ref 70–110)

## 2018-01-27 PROCEDURE — 82962 GLUCOSE BLOOD TEST: CPT

## 2018-01-27 NOTE — ED PROVIDER NOTES
HPI Comments: Alessia Marion is a 47 y.o. Female with a PMHx of HTN and Psychiatric disorder was brought into the ED via EMS. Patient was found passed out on bench due to level of intoxication. During patient's physical exam she stated \"don't touch me\" to Dr. Trisha Worley, where she also stated she was in no pain. As the patient is without physical symptoms or complaints of pain, there is no severity of pain, quality of pain, duration, modifying factors, or associated signs and symptoms regarding the pt's presenting complaint. Hx is limited due to patient's condition. The history is provided by the patient. The history is limited by the condition of the patient. Past Medical History:   Diagnosis Date    Hypertension     Other ill-defined conditions(129.89)     high cholesterol    Psychiatric disorder        No past surgical history on file. Family History:   Problem Relation Age of Onset    Stroke Mother     Diabetes Mother     Hypertension Father     Diabetes Father     Cancer Paternal Uncle        Social History     Social History    Marital status:      Spouse name: N/A    Number of children: N/A    Years of education: N/A     Occupational History    Not on file. Social History Main Topics    Smoking status: Never Smoker    Smokeless tobacco: Never Used    Alcohol use Yes    Drug use: No    Sexual activity: No     Other Topics Concern    Not on file     Social History Narrative         ALLERGIES: Sulfa (sulfonamide antibiotics)    Review of Systems   Unable to perform ROS: Other (ETOH)       Vitals:    01/26/18 2118 01/27/18 0300   BP: 134/88 (!) 154/94   Pulse: 82 100   Resp: 16 16   Temp: 98.4 °F (36.9 °C) 98.3 °F (36.8 °C)   SpO2: 99%             Physical Exam   Constitutional:   General:  Well-developed, well-nourished, clinically intoxicated arousable with noxious stimulus and loud voice. Tells me to leave her alone in a clear voice.     Head:  Normocephalic atraumatic. Eyes:  Pupils midrange extraocular movements intact. No pallor or conjunctival injection. Nose:  No rhinorrhea, inspection grossly normal.    Ears:  Grossly normal to inspection, no discharge. Mouth:  Mucous membranes moist, no appreciable intraoral lesion. Neck/Back:  Trachea midline, no asymmetry. No pain on palpation down cervical, thoracic, or lumbar spine step-off or deformity. Chest:  Grossly normal inspection, symmetric chest rise. Pulmonary:  Clear to auscultation bilaterally no wheezes rhonchi or rales. Cardiovascular:  S1-S2 no murmurs rubs or gallops. Abdomen: Soft, nontender, nondistended no guarding rebound or peritoneal signs. Extremities:  Grossly normal to inspection, peripheral pulses intact    Neurologic:  Alert and oriented no appreciable focal neurologic deficit. Skin:  Warm and dry  Psychiatric:  Grossly normal mood and affect. Nursing note reviewed, vital signs reviewed. MDM  Number of Diagnoses or Management Options  Alcoholic intoxication without complication [facility]):   Diagnosis management comments: ED course:  Patient presents with altered mental status clinical intoxication well-known for the same. She is afebrile and not tachycardic saturation normal on room air    No external trauma we'll monitor untill clinical sobriety    Patient reevaluated 0730 hrs.: She was able to ambulate with nontoxic nonantalgic gait, no pain complaints was able tolerate by mouth intake and will be discharged home    Patient's presentation, history, physical exam and laboratory evaluations were reviewed. At this time patient was felt to be stable for outpatient management and follow with primary care/specialist.  Patient was instructed to return to the emergency department with any concerns. Disposition:    Discharged home      Portions of this chart were created with Dragon medical speech to text program.   Unrecognized errors may be present.       ED Course       Procedures  Vitals:  Patient Vitals for the past 12 hrs:   Temp Pulse Resp BP SpO2   01/27/18 0300 98.3 °F (36.8 °C) 100 16 (!) 154/94 -   01/26/18 2118 98.4 °F (36.9 °C) 82 16 134/88 99 %       Medications Ordered:  Medications - No data to display    Lab Findings:  Recent Results (from the past 12 hour(s))   GLUCOSE, POC    Collection Time: 01/27/18  3:57 AM   Result Value Ref Range    Glucose (POC) 104 70 - 110 mg/dL           Follow-up Information     Follow up With Details Comments Contact Info    [facility] Call in 2 days  [facility] [id number] [facility] EMERGENCY DEPT  As needed, If symptoms worsen [address] [place] [id number]  [phone]           Patient's Medications   Start Taking    No medications on file   Continue Taking    HYDROCHLOROTHIAZIDE (HYDRODIURIL) 25 MG TABLET    Take 1 Tab by mouth daily. Indications: Edema, hypertension    MULTIVITAMIN (ONE A DAY) TABLET    Take 1 Tab by mouth daily. OMEGA-3 ACID ETHYL ESTERS (LOVAZA) 1 GRAM CAPSULE    Take 2 Caps by mouth two (2) times a day. TRAZODONE (DESYREL) 100 MG TABLET    Take 1 Tab by mouth nightly. These Medications have changed    No medications on file   Stop Taking    No medications on file       Scribe Attestation     Amber C Lonzell Rubinstein acting as a scribe for and in the presence of Ke Nichols MD      January 26, 2018 at 10:17 PM       Provider Attestation:      I personally performed the services described in the documentation, reviewed the documentation, as recorded by the scribe in my presence, and it accurately and completely records my words and actions.  January 26, 2018 at 10:17 PM - Ke Nichols MD

## 2018-04-20 ENCOUNTER — HOSPITAL ENCOUNTER (EMERGENCY)
Age: 53
Discharge: STILL ADMITTED OR ADMINISTRATIVE | End: 2018-04-20
Attending: EMERGENCY MEDICINE
Payer: SELF-PAY

## 2018-04-20 PROCEDURE — 75810000275 HC EMERGENCY DEPT VISIT NO LEVEL OF CARE

## 2019-07-15 ENCOUNTER — APPOINTMENT (OUTPATIENT)
Dept: GENERAL RADIOLOGY | Age: 54
DRG: 720 | End: 2019-07-15
Attending: EMERGENCY MEDICINE
Payer: MEDICAID

## 2019-07-15 ENCOUNTER — HOSPITAL ENCOUNTER (INPATIENT)
Age: 54
LOS: 4 days | Discharge: HOME | DRG: 720 | End: 2019-07-19
Attending: EMERGENCY MEDICINE | Admitting: EMERGENCY MEDICINE
Payer: MEDICAID

## 2019-07-15 DIAGNOSIS — E86.0 DEHYDRATION: ICD-10-CM

## 2019-07-15 DIAGNOSIS — N17.9 AKI (ACUTE KIDNEY INJURY) (HCC): Primary | ICD-10-CM

## 2019-07-15 DIAGNOSIS — F10.20 ALCOHOL USE DISORDER, SEVERE, DEPENDENCE (HCC): ICD-10-CM

## 2019-07-15 PROBLEM — E87.1 HYPONATREMIA: Status: ACTIVE | Noted: 2019-07-15

## 2019-07-15 LAB
ALBUMIN SERPL-MCNC: 4.1 G/DL (ref 3.4–5)
ALBUMIN/GLOB SERPL: 1 {RATIO} (ref 0.8–1.7)
ALP SERPL-CCNC: 100 U/L (ref 45–117)
ALT SERPL-CCNC: 108 U/L (ref 13–56)
ANION GAP SERPL CALC-SCNC: 11 MMOL/L (ref 3–18)
ANION GAP SERPL CALC-SCNC: 14 MMOL/L (ref 3–18)
APPEARANCE UR: ABNORMAL
AST SERPL-CCNC: 150 U/L (ref 15–37)
ATRIAL RATE: 92 BPM
BACTERIA URNS QL MICRO: ABNORMAL /HPF
BASOPHILS # BLD: 0 K/UL (ref 0–0.1)
BASOPHILS NFR BLD: 0 % (ref 0–2)
BILIRUB DIRECT SERPL-MCNC: 0.3 MG/DL (ref 0–0.2)
BILIRUB SERPL-MCNC: 1.1 MG/DL (ref 0.2–1)
BILIRUB UR QL: NEGATIVE
BUN SERPL-MCNC: 49 MG/DL (ref 7–18)
BUN SERPL-MCNC: 61 MG/DL (ref 7–18)
BUN/CREAT SERPL: 11 (ref 12–20)
BUN/CREAT SERPL: 9 (ref 12–20)
CALCIUM SERPL-MCNC: 5.9 MG/DL (ref 8.5–10.1)
CALCIUM SERPL-MCNC: 8.5 MG/DL (ref 8.5–10.1)
CALCULATED P AXIS, ECG09: 63 DEGREES
CALCULATED R AXIS, ECG10: 25 DEGREES
CALCULATED T AXIS, ECG11: 46 DEGREES
CHLORIDE SERPL-SCNC: 103 MMOL/L (ref 100–108)
CHLORIDE SERPL-SCNC: 82 MMOL/L (ref 100–108)
CK SERPL-CCNC: 188 U/L (ref 26–192)
CO2 SERPL-SCNC: 20 MMOL/L (ref 21–32)
CO2 SERPL-SCNC: 26 MMOL/L (ref 21–32)
COLOR UR: YELLOW
CREAT SERPL-MCNC: 4.64 MG/DL (ref 0.6–1.3)
CREAT SERPL-MCNC: 7.05 MG/DL (ref 0.6–1.3)
DIAGNOSIS, 93000: NORMAL
DIFFERENTIAL METHOD BLD: ABNORMAL
EOSINOPHIL # BLD: 0 K/UL (ref 0–0.4)
EOSINOPHIL NFR BLD: 0 % (ref 0–5)
EPITH CASTS URNS QL MICRO: ABNORMAL /LPF (ref 0–5)
ERYTHROCYTE [DISTWIDTH] IN BLOOD BY AUTOMATED COUNT: 13.6 % (ref 11.6–14.5)
ETHANOL SERPL-MCNC: <3 MG/DL (ref 0–3)
GLOBULIN SER CALC-MCNC: 4.2 G/DL (ref 2–4)
GLUCOSE SERPL-MCNC: 112 MG/DL (ref 74–99)
GLUCOSE SERPL-MCNC: 87 MG/DL (ref 74–99)
GLUCOSE UR STRIP.AUTO-MCNC: NEGATIVE MG/DL
HCT VFR BLD AUTO: 29.1 % (ref 35–45)
HGB BLD-MCNC: 10.3 G/DL (ref 12–16)
HGB UR QL STRIP: NEGATIVE
HYALINE CASTS URNS QL MICRO: ABNORMAL /LPF (ref 0–2)
KETONES UR QL STRIP.AUTO: ABNORMAL MG/DL
LACTATE BLD-SCNC: 1.43 MMOL/L (ref 0.4–2)
LACTATE BLD-SCNC: 2.29 MMOL/L (ref 0.4–2)
LEUKOCYTE ESTERASE UR QL STRIP.AUTO: ABNORMAL
LIPASE SERPL-CCNC: 462 U/L (ref 73–393)
LYMPHOCYTES # BLD: 1.3 K/UL (ref 0.9–3.6)
LYMPHOCYTES NFR BLD: 13 % (ref 21–52)
MAGNESIUM SERPL-MCNC: 1.7 MG/DL (ref 1.6–2.6)
MCH RBC QN AUTO: 35.8 PG (ref 24–34)
MCHC RBC AUTO-ENTMCNC: 35.4 G/DL (ref 31–37)
MCV RBC AUTO: 101 FL (ref 74–97)
MONOCYTES # BLD: 0.6 K/UL (ref 0.05–1.2)
MONOCYTES NFR BLD: 6 % (ref 3–10)
NEUTS SEG # BLD: 8 K/UL (ref 1.8–8)
NEUTS SEG NFR BLD: 81 % (ref 40–73)
NITRITE UR QL STRIP.AUTO: NEGATIVE
P-R INTERVAL, ECG05: 178 MS
PH UR STRIP: 5 [PH] (ref 5–8)
PHOSPHATE SERPL-MCNC: 6.9 MG/DL (ref 2.5–4.9)
PLATELET # BLD AUTO: 164 K/UL (ref 135–420)
PMV BLD AUTO: 10.3 FL (ref 9.2–11.8)
POTASSIUM SERPL-SCNC: 3.4 MMOL/L (ref 3.5–5.5)
POTASSIUM SERPL-SCNC: 4 MMOL/L (ref 3.5–5.5)
PROT SERPL-MCNC: 8.3 G/DL (ref 6.4–8.2)
PROT UR STRIP-MCNC: 30 MG/DL
Q-T INTERVAL, ECG07: 364 MS
QRS DURATION, ECG06: 82 MS
QTC CALCULATION (BEZET), ECG08: 450 MS
RBC # BLD AUTO: 2.88 M/UL (ref 4.2–5.3)
RBC #/AREA URNS HPF: ABNORMAL /HPF (ref 0–5)
SODIUM SERPL-SCNC: 122 MMOL/L (ref 136–145)
SODIUM SERPL-SCNC: 134 MMOL/L (ref 136–145)
SP GR UR REFRACTOMETRY: 1.02 (ref 1–1.03)
UROBILINOGEN UR QL STRIP.AUTO: 1 EU/DL (ref 0.2–1)
VENTRICULAR RATE, ECG03: 92 BPM
WBC # BLD AUTO: 9.8 K/UL (ref 4.6–13.2)
WBC URNS QL MICRO: ABNORMAL /HPF (ref 0–4)

## 2019-07-15 PROCEDURE — 74011250636 HC RX REV CODE- 250/636: Performed by: HOSPITALIST

## 2019-07-15 PROCEDURE — 81001 URINALYSIS AUTO W/SCOPE: CPT

## 2019-07-15 PROCEDURE — 87040 BLOOD CULTURE FOR BACTERIA: CPT

## 2019-07-15 PROCEDURE — 82306 VITAMIN D 25 HYDROXY: CPT

## 2019-07-15 PROCEDURE — 84100 ASSAY OF PHOSPHORUS: CPT

## 2019-07-15 PROCEDURE — 80074 ACUTE HEPATITIS PANEL: CPT

## 2019-07-15 PROCEDURE — 82977 ASSAY OF GGT: CPT

## 2019-07-15 PROCEDURE — 74011000258 HC RX REV CODE- 258: Performed by: HOSPITALIST

## 2019-07-15 PROCEDURE — 65660000000 HC RM CCU STEPDOWN

## 2019-07-15 PROCEDURE — 74011000258 HC RX REV CODE- 258: Performed by: EMERGENCY MEDICINE

## 2019-07-15 PROCEDURE — 80307 DRUG TEST PRSMV CHEM ANLYZR: CPT

## 2019-07-15 PROCEDURE — 83735 ASSAY OF MAGNESIUM: CPT

## 2019-07-15 PROCEDURE — 85025 COMPLETE CBC W/AUTO DIFF WBC: CPT

## 2019-07-15 PROCEDURE — 87449 NOS EACH ORGANISM AG IA: CPT

## 2019-07-15 PROCEDURE — 80048 BASIC METABOLIC PNL TOTAL CA: CPT

## 2019-07-15 PROCEDURE — 74011250637 HC RX REV CODE- 250/637: Performed by: HOSPITALIST

## 2019-07-15 PROCEDURE — 93005 ELECTROCARDIOGRAM TRACING: CPT

## 2019-07-15 PROCEDURE — 74011250636 HC RX REV CODE- 250/636: Performed by: EMERGENCY MEDICINE

## 2019-07-15 PROCEDURE — 83605 ASSAY OF LACTIC ACID: CPT

## 2019-07-15 PROCEDURE — 74011000250 HC RX REV CODE- 250: Performed by: HOSPITALIST

## 2019-07-15 PROCEDURE — 86706 HEP B SURFACE ANTIBODY: CPT

## 2019-07-15 PROCEDURE — 80076 HEPATIC FUNCTION PANEL: CPT

## 2019-07-15 PROCEDURE — 99285 EMERGENCY DEPT VISIT HI MDM: CPT

## 2019-07-15 PROCEDURE — 83690 ASSAY OF LIPASE: CPT

## 2019-07-15 PROCEDURE — 87086 URINE CULTURE/COLONY COUNT: CPT

## 2019-07-15 PROCEDURE — 74011250636 HC RX REV CODE- 250/636: Performed by: INTERNAL MEDICINE

## 2019-07-15 PROCEDURE — 86592 SYPHILIS TEST NON-TREP QUAL: CPT

## 2019-07-15 PROCEDURE — 96368 THER/DIAG CONCURRENT INF: CPT

## 2019-07-15 PROCEDURE — 82550 ASSAY OF CK (CPK): CPT

## 2019-07-15 PROCEDURE — 96365 THER/PROPH/DIAG IV INF INIT: CPT

## 2019-07-15 PROCEDURE — 71045 X-RAY EXAM CHEST 1 VIEW: CPT

## 2019-07-15 RX ORDER — SODIUM CHLORIDE 9 MG/ML
100 INJECTION, SOLUTION INTRAVENOUS CONTINUOUS
Status: DISCONTINUED | OUTPATIENT
Start: 2019-07-15 | End: 2019-07-16

## 2019-07-15 RX ORDER — HYDROMORPHONE HYDROCHLORIDE 1 MG/ML
1 INJECTION, SOLUTION INTRAMUSCULAR; INTRAVENOUS; SUBCUTANEOUS
Status: DISCONTINUED | OUTPATIENT
Start: 2019-07-15 | End: 2019-07-19 | Stop reason: HOSPADM

## 2019-07-15 RX ORDER — SODIUM CHLORIDE 0.9 % (FLUSH) 0.9 %
5-40 SYRINGE (ML) INJECTION EVERY 8 HOURS
Status: DISCONTINUED | OUTPATIENT
Start: 2019-07-15 | End: 2019-07-18

## 2019-07-15 RX ORDER — IBUPROFEN 400 MG/1
400 TABLET ORAL
Status: DISCONTINUED | OUTPATIENT
Start: 2019-07-15 | End: 2019-07-15

## 2019-07-15 RX ORDER — NALOXONE HYDROCHLORIDE 0.4 MG/ML
0.4 INJECTION, SOLUTION INTRAMUSCULAR; INTRAVENOUS; SUBCUTANEOUS
Status: DISCONTINUED | OUTPATIENT
Start: 2019-07-15 | End: 2019-07-19 | Stop reason: HOSPADM

## 2019-07-15 RX ORDER — MAGNESIUM SULFATE HEPTAHYDRATE 500 MG/ML
1 INJECTION, SOLUTION INTRAMUSCULAR; INTRAVENOUS
Status: DISPENSED | OUTPATIENT
Start: 2019-07-15 | End: 2019-07-16

## 2019-07-15 RX ORDER — LORAZEPAM 1 MG/1
2 TABLET ORAL
Status: DISCONTINUED | OUTPATIENT
Start: 2019-07-15 | End: 2019-07-15 | Stop reason: SDUPTHER

## 2019-07-15 RX ORDER — SODIUM CHLORIDE 0.9 % (FLUSH) 0.9 %
5-40 SYRINGE (ML) INJECTION AS NEEDED
Status: DISCONTINUED | OUTPATIENT
Start: 2019-07-15 | End: 2019-07-19 | Stop reason: HOSPADM

## 2019-07-15 RX ORDER — LEVOFLOXACIN 5 MG/ML
750 INJECTION, SOLUTION INTRAVENOUS EVERY 24 HOURS
Status: DISCONTINUED | OUTPATIENT
Start: 2019-07-15 | End: 2019-07-15

## 2019-07-15 RX ORDER — THIAMINE HYDROCHLORIDE 100 MG/ML
500 INJECTION, SOLUTION INTRAMUSCULAR; INTRAVENOUS EVERY 8 HOURS
Status: DISCONTINUED | OUTPATIENT
Start: 2019-07-15 | End: 2019-07-15 | Stop reason: RX

## 2019-07-15 RX ORDER — SODIUM CHLORIDE 0.9 % (FLUSH) 0.9 %
5-10 SYRINGE (ML) INJECTION AS NEEDED
Status: DISCONTINUED | OUTPATIENT
Start: 2019-07-15 | End: 2019-07-18

## 2019-07-15 RX ORDER — LORAZEPAM 1 MG/1
1 TABLET ORAL
Status: DISCONTINUED | OUTPATIENT
Start: 2019-07-15 | End: 2019-07-16

## 2019-07-15 RX ORDER — LEVOFLOXACIN 5 MG/ML
500 INJECTION, SOLUTION INTRAVENOUS
Status: DISCONTINUED | OUTPATIENT
Start: 2019-07-17 | End: 2019-07-16

## 2019-07-15 RX ORDER — LEVOFLOXACIN 5 MG/ML
750 INJECTION, SOLUTION INTRAVENOUS ONCE
Status: COMPLETED | OUTPATIENT
Start: 2019-07-15 | End: 2019-07-15

## 2019-07-15 RX ORDER — LORAZEPAM 2 MG/ML
2 INJECTION INTRAMUSCULAR
Status: DISCONTINUED | OUTPATIENT
Start: 2019-07-15 | End: 2019-07-15 | Stop reason: SDUPTHER

## 2019-07-15 RX ORDER — LORAZEPAM 2 MG/ML
3 INJECTION INTRAMUSCULAR
Status: DISCONTINUED | OUTPATIENT
Start: 2019-07-15 | End: 2019-07-16

## 2019-07-15 RX ORDER — VANCOMYCIN/0.9 % SOD CHLORIDE 1 G/100 ML
1000 PLASTIC BAG, INJECTION (ML) INTRAVENOUS ONCE
Status: DISCONTINUED | OUTPATIENT
Start: 2019-07-15 | End: 2019-07-15 | Stop reason: DRUGHIGH

## 2019-07-15 RX ORDER — VANCOMYCIN/0.9 % SOD CHLORIDE 1.5G/250ML
1500 PLASTIC BAG, INJECTION (ML) INTRAVENOUS ONCE
Status: COMPLETED | OUTPATIENT
Start: 2019-07-15 | End: 2019-07-15

## 2019-07-15 RX ORDER — IBUPROFEN 400 MG/1
400 TABLET ORAL
Status: DISCONTINUED | OUTPATIENT
Start: 2019-07-15 | End: 2019-07-15 | Stop reason: SDUPTHER

## 2019-07-15 RX ORDER — LORAZEPAM 2 MG/ML
2 INJECTION INTRAMUSCULAR
Status: DISCONTINUED | OUTPATIENT
Start: 2019-07-15 | End: 2019-07-16

## 2019-07-15 RX ORDER — POTASSIUM CHLORIDE 7.45 MG/ML
10 INJECTION INTRAVENOUS
Status: COMPLETED | OUTPATIENT
Start: 2019-07-15 | End: 2019-07-16

## 2019-07-15 RX ORDER — LORAZEPAM 1 MG/1
1 TABLET ORAL
Status: DISCONTINUED | OUTPATIENT
Start: 2019-07-15 | End: 2019-07-15 | Stop reason: SDUPTHER

## 2019-07-15 RX ORDER — LORAZEPAM 2 MG/ML
1 INJECTION INTRAMUSCULAR
Status: DISCONTINUED | OUTPATIENT
Start: 2019-07-15 | End: 2019-07-16

## 2019-07-15 RX ORDER — KETOROLAC TROMETHAMINE 30 MG/ML
30 INJECTION, SOLUTION INTRAMUSCULAR; INTRAVENOUS
Status: DISCONTINUED | OUTPATIENT
Start: 2019-07-15 | End: 2019-07-15

## 2019-07-15 RX ORDER — LORAZEPAM 1 MG/1
2 TABLET ORAL
Status: DISCONTINUED | OUTPATIENT
Start: 2019-07-15 | End: 2019-07-16

## 2019-07-15 RX ORDER — SODIUM CHLORIDE 0.9 % (FLUSH) 0.9 %
5-40 SYRINGE (ML) INJECTION AS NEEDED
Status: DISCONTINUED | OUTPATIENT
Start: 2019-07-15 | End: 2019-07-18

## 2019-07-15 RX ORDER — SODIUM CHLORIDE 9 MG/ML
1000 INJECTION, SOLUTION INTRAVENOUS ONCE
Status: COMPLETED | OUTPATIENT
Start: 2019-07-15 | End: 2019-07-16

## 2019-07-15 RX ADMIN — PIPERACILLIN, TAZOBACTAM 4.5 G: 4; .5 INJECTION, POWDER, LYOPHILIZED, FOR SOLUTION INTRAVENOUS at 16:25

## 2019-07-15 RX ADMIN — LEVOFLOXACIN 750 MG: 750 INJECTION, SOLUTION INTRAVENOUS at 17:46

## 2019-07-15 RX ADMIN — IBUPROFEN 400 MG: 400 TABLET ORAL at 19:53

## 2019-07-15 RX ADMIN — SODIUM CHLORIDE, SODIUM LACTATE, POTASSIUM CHLORIDE, AND CALCIUM CHLORIDE 274 ML: 600; 310; 30; 20 INJECTION, SOLUTION INTRAVENOUS at 16:00

## 2019-07-15 RX ADMIN — SODIUM CHLORIDE, SODIUM LACTATE, POTASSIUM CHLORIDE, AND CALCIUM CHLORIDE 1000 ML: 600; 310; 30; 20 INJECTION, SOLUTION INTRAVENOUS at 16:00

## 2019-07-15 RX ADMIN — SODIUM CHLORIDE 1000 ML: 9 INJECTION, SOLUTION INTRAVENOUS at 20:30

## 2019-07-15 RX ADMIN — THIAMINE HYDROCHLORIDE 500 MG: 100 INJECTION, SOLUTION INTRAMUSCULAR; INTRAVENOUS at 22:54

## 2019-07-15 RX ADMIN — SODIUM CHLORIDE 1000 ML: 900 INJECTION, SOLUTION INTRAVENOUS at 17:50

## 2019-07-15 RX ADMIN — LORAZEPAM 2 MG: 2 INJECTION INTRAMUSCULAR; INTRAVENOUS at 19:59

## 2019-07-15 RX ADMIN — VANCOMYCIN HYDROCHLORIDE 1500 MG: 10 INJECTION, POWDER, LYOPHILIZED, FOR SOLUTION INTRAVENOUS at 16:30

## 2019-07-15 RX ADMIN — POTASSIUM CHLORIDE 10 MEQ: 10 INJECTION, SOLUTION INTRAVENOUS at 19:00

## 2019-07-15 RX ADMIN — Medication 10 ML: at 20:40

## 2019-07-15 RX ADMIN — Medication 10 ML: at 22:54

## 2019-07-15 RX ADMIN — POTASSIUM CHLORIDE 10 MEQ: 10 INJECTION, SOLUTION INTRAVENOUS at 20:43

## 2019-07-15 RX ADMIN — THIAMINE HYDROCHLORIDE: 100 INJECTION, SOLUTION INTRAMUSCULAR; INTRAVENOUS at 20:30

## 2019-07-15 RX ADMIN — SODIUM CHLORIDE 100 ML/HR: 900 INJECTION, SOLUTION INTRAVENOUS at 16:00

## 2019-07-15 NOTE — ED TRIAGE NOTES
Pt reports vomiting for 4 days, and diarrhea for 3 days. Took immodium a few hours ago. Feels lightheaded.  HX: HTN

## 2019-07-15 NOTE — ED PROVIDER NOTES
EMERGENCY DEPARTMENT HISTORY AND PHYSICAL EXAM    5:02 PM  Date: 7/15/2019  Patient Name: Layton Boles    History of Presenting Illness     Chief Complaint   Patient presents with    Hypotension    Vomiting    Diarrhea        History Provided By: Patient    HPI: Layton Boles is a 46 y.o. female with history of hypertension, psychiatric disorder, homelessness and diabetes. Patient is presenting with nausea, vomiting, diarrhea for the past 4 days. Denies abdominal pain only cramping after vomiting. No history of fever or chills. No urinary symptoms. Patient think thinks it may be related to something that she ate. Denies recent antibiotic use. Not been able to tolerate p.o. for the past 4 days. No history of cough respiratory symptoms    Location:  Severity:  Timing/course: Onset/Duration:     PCP: Daxa Washington MD    Past History     Past Medical History:  Past Medical History:   Diagnosis Date    Hypertension     Other ill-defined conditions(799.89)     high cholesterol    Psychiatric disorder        Past Surgical History:  No past surgical history on file. Family History:  Family History   Problem Relation Age of Onset   [address] [facility] Stroke Mother     Diabetes Mother     Hypertension Father     Diabetes Father     Cancer Paternal Uncle        Social History:  Social History     Tobacco Use    Smoking status: Never Smoker    Smokeless tobacco: Never Used   Substance Use Topics    Alcohol use: Yes    Drug use: No       Allergies: Allergies   Allergen Reactions    Sulfa (Sulfonamide Antibiotics) Rash       Review of Systems   Review of Systems   Constitutional: Positive for diaphoresis and fatigue. Gastrointestinal: Positive for diarrhea, nausea and vomiting. All other systems reviewed and are negative.        Physical Exam     Patient Vitals for the past 12 hrs:   Temp Pulse Resp BP SpO2   07/15/19 1645  86 16 91/47 99 %   07/15/19 1602  89 25  100 %   07/15/19 1601  88 20   07/15/19 1600    (!) 84/39    07/15/19 1559    96/47    07/15/19 1455 96.8 °F (36 °C) (!) 102 22 (!) 73/53 100 %       Physical Exam   Constitutional: She is oriented to person, place, and time. She appears well-developed and well-nourished. No distress. HENT:   Head: Normocephalic and atraumatic. Mouth/Throat: Mucous membranes are dry. Eyes: Conjunctivae and EOM are normal.   Neck: Normal range of motion. Neck supple. Cardiovascular: Normal rate and normal heart sounds. Pulmonary/Chest: Effort normal and breath sounds normal. No respiratory distress. Abdominal: Soft. She exhibits no distension. There is no tenderness. Musculoskeletal: Normal range of motion. She exhibits no deformity. Neurological: She is alert and oriented to person, place, and time. Skin: Skin is warm and dry. She is not diaphoretic. Psychiatric: She has a normal mood and affect.  Her behavior is normal. Judgment and thought content normal.       Diagnostic Study Results     Labs -  Recent Results (from the past 12 hour(s))   EKG, 12 LEAD, INITIAL    Collection Time: 07/15/19  3:11 PM   Result Value Ref Range    Ventricular Rate 92 BPM    Atrial Rate 92 BPM    P-R Interval 178 ms    QRS Duration 82 ms    Q-T Interval 364 ms    QTC Calculation (Bezet) 450 ms    Calculated P Axis 63 degrees    Calculated R Axis 25 degrees    Calculated T Axis 46 degrees    Diagnosis       Normal sinus rhythm  Normal ECG  When compared with ECG of 24-FEB-2015 15:41,  No significant change was found  Confirmed by Eliecer Rivera MD, ----- ([id number]) on 7/15/2019 4:39:44 PM     CBC WITH AUTOMATED DIFF    Collection Time: 07/15/19  3:26 PM   Result Value Ref Range    WBC 9.8 4.6 - 13.2 K/uL    RBC 2.88 (L) 4.20 - 5.30 M/uL    HGB 10.3 (L) 12.0 - 16.0 g/dL    HCT 29.1 (L) 35.0 - 45.0 %    .0 (H) 74.0 - 97.0 FL    MCH 35.8 (H) 24.0 - 34.0 PG    MCHC 35.4 31.0 - 37.0 g/dL    RDW 13.6 11.6 - 14.5 %    PLATELET 264 116 - 303 K/uL    MPV 10.3 9.2 - 11.8 FL    NEUTROPHILS 81 (H) 40 - 73 %    LYMPHOCYTES 13 (L) 21 - 52 %    MONOCYTES 6 3 - 10 %    EOSINOPHILS 0 0 - 5 %    BASOPHILS 0 0 - 2 %    ABS. NEUTROPHILS 8.0 1.8 - 8.0 K/UL    ABS. LYMPHOCYTES 1.3 0.9 - 3.6 K/UL    ABS. MONOCYTES 0.6 0.05 - 1.2 K/UL    ABS. EOSINOPHILS 0.0 0.0 - 0.4 K/UL    ABS. BASOPHILS 0.0 0.0 - 0.1 K/UL    DF AUTOMATED     METABOLIC PANEL, BASIC    Collection Time: 07/15/19  3:26 PM   Result Value Ref Range    Sodium 122 (L) 136 - 145 mmol/L    Potassium 3.4 (L) 3.5 - 5.5 mmol/L    Chloride 82 (L) 100 - 108 mmol/L    CO2 26 21 - 32 mmol/L    Anion gap 14 3.0 - 18 mmol/L    Glucose 112 (H) 74 - 99 mg/dL    BUN 61 (H) 7.0 - 18 MG/DL    Creatinine 7.05 (H) 0.6 - 1.3 MG/DL    BUN/Creatinine ratio 9 (L) 12 - 20      GFR est AA 7 (L) >60 ml/min/1.73m2    GFR est non-AA 6 (L) >60 ml/min/1.73m2    Calcium 8.5 8.5 - 10.1 MG/DL   HEPATIC FUNCTION PANEL    Collection Time: 07/15/19  3:26 PM   Result Value Ref Range    Protein, total 8.3 (H) 6.4 - 8.2 g/dL    Albumin 4.1 3.4 - 5.0 g/dL    Globulin 4.2 (H) 2.0 - 4.0 g/dL    A-G Ratio 1.0 0.8 - 1.7      Bilirubin, total 1.1 (H) 0.2 - 1.0 MG/DL    Bilirubin, direct 0.3 (H) 0.0 - 0.2 MG/DL    Alk.  phosphatase 100 45 - 117 U/L    AST (SGOT) 150 (H) 15 - 37 U/L    ALT (SGPT) 108 (H) 13 - 56 U/L   LIPASE    Collection Time: 07/15/19  3:26 PM   Result Value Ref Range    Lipase 462 (H) 73 - 393 U/L   URINALYSIS W/ RFLX MICROSCOPIC    Collection Time: 07/15/19  3:26 PM   Result Value Ref Range    Color YELLOW      Appearance TURBID      Specific gravity 1.018 1.005 - 1.030      pH (UA) 5.0 5.0 - 8.0      Protein 30 (A) NEG mg/dL    Glucose NEGATIVE  NEG mg/dL    Ketone TRACE (A) NEG mg/dL    Bilirubin NEGATIVE  NEG      Blood NEGATIVE  NEG      Urobilinogen 1.0 0.2 - 1.0 EU/dL    Nitrites NEGATIVE  NEG      Leukocyte Esterase SMALL (A) NEG     URINE MICROSCOPIC ONLY    Collection Time: 07/15/19  3:26 PM   Result Value Ref Range    WBC 4 to 10 0 - 4 /hpf    RBC 0 to 3 0 - 5 /hpf    Epithelial cells 3+ 0 - 5 /lpf    Bacteria 2+ (A) NEG /hpf    Hyaline cast 4 to 10 0 - 2 /lpf   POC LACTIC ACID    Collection Time: 07/15/19  3:35 PM   Result Value Ref Range    Lactic Acid (POC) 2.29 (HH) 0.40 - 2.00 mmol/L       Radiologic Studies -   Xr Chest Sngl V    Result Date: 7/15/2019  IMPRESSION: No acute findings. Medical Decision Making     ED Course: Progress Notes, Reevaluation, and Consults:    5:02 PM Initial assessment performed. The patients presenting problems have been discussed, and they/their family are in agreement with the care plan formulated and outlined with them. I have encouraged them to ask questions as they arise throughout their visit.    5:03 PM  Blood pressure improved with maps greater than 65. I will consult hospitalist for admission this is likely dehydration related to the gastroenteritis. 5:30 PM  Hospitalist asked for nephrology consult. That was done. Provider Notes (Medical Decision Making): 56-year-old female presenting with symptoms of vomiting and diarrhea for 4 days, patient was vomiting actively in triage and diaphoretic she also was hypotensive. Septic bundle was initiated on her immediately and she was brought back to the room. Patient is well-appearing otherwise but appears dehydrated. Abdominal exam is reassuring, do not think there is a current need for abdominal imaging however will continue with the sepsis protocol and ordered IV hydration for the patient's well as antibiotics. Procedures:     Critical Care Time: Upon my evaluation, this patient had a high probability of imminent or life-threatening deterioration due to sepsis, which required my direct attention, intervention, and personal management. I have personally provided 35 minutes of critical care time exclusive of time spent on separately billable procedures.  Time includes review of laboratory data, radiology results, discussion with consultants, and monitoring for potential decompensation. Interventions were performed as documented above. Ajay Rowell MD  5:05 PM        Vital Signs-Reviewed the patient's vital signs. Reviewed pt's pulse ox reading. EKG: Interpreted by the EP. Time Interpreted:    Rate:    Rhythm: Normal Sinus Rhythm 92   Interpretation: Normal EKG   Comparison: No significant interval changes    Records Reviewed: Nursing Notes, Old Medical Records, Previous electrocardiograms, Previous Radiology Studies and Previous Laboratory Studies (Time of Review: 5:02 PM)  -I am the first provider for this patient.  -I reviewed the vital signs, available nursing notes, past medical history, past surgical history, family history and social history. Current Facility-Administered Medications   Medication Dose Route Frequency Provider Last Rate Last Dose    0.9% sodium chloride infusion  100 mL/hr IntraVENous CONTINUOUS Nanette Gonzales  mL/hr at 07/15/19 1600 100 mL/hr at 07/15/19 1600    sodium chloride (NS) flush 5-10 mL  5-10 mL IntraVENous PRN Nanette Gonzales PA        vancomycin (VANCOCIN) 1500 mg in  ml infusion  1,500 mg IntraVENous ONCE Ajay Rowell  mL/hr at 07/15/19 1630 1,500 mg at 07/15/19 1630    levoFLOXacin (LEVAQUIN) 750 mg in D5W IVPB  750 mg IntraVENous ONCE Ajay Rowell MD        [START ON 7/17/2019] levoFLOXacin (LEVAQUIN) 500 mg in D5W IVPB  500 mg IntraVENous Q48H Ajay Rowell MD        [START ON 7/16/2019] piperacillin-tazobactam (ZOSYN) 2.25 g in 0.9% sodium chloride (MBP/ADV) 50 mL MBP  2.25 g IntraVENous Q8H Ajay Rowell MD        VANCOMYCIN INFORMATION NOTE   Other CONTINUOUS Ajay Rowell MD         Current Outpatient Medications   Medication Sig Dispense Refill    traZODone (DESYREL) 100 mg tablet Take 1 Tab by mouth nightly. 30 Tab 1    hydroCHLOROthiazide (HYDRODIURIL) 25 mg tablet Take 1 Tab by mouth daily.  Indications: Edema, hypertension 30 Tab 3    omega-3 acid ethyl esters (LOVAZA) 1 gram capsule Take 2 Caps by mouth two (2) times a day. 360 Cap 3    multivitamin (ONE A DAY) tablet Take 1 Tab by mouth daily. Clinical Impression     Clinical Impression: No diagnosis found. Disposition: Admit        This note was dictated utilizing voice recognition software which may lead to typographical errors. I apologize in advance if the situation occurs. If questions arise please do not hesitate to contact me or call our department.     Ajay Shrestha MD  5:02 PM

## 2019-07-15 NOTE — ED NOTES
Assumed care of pt. Pt hypotensive and dizzy. Pt c/o N/V/D x 1 week. Pt connected to cardiac monitor. MD at bedside. Second IV established. Fluids initiated per MD order, see MAR.

## 2019-07-16 ENCOUNTER — HOSPITAL ENCOUNTER (INPATIENT)
Dept: ULTRASOUND IMAGING | Age: 54
Discharge: HOME | DRG: 720 | End: 2019-07-16
Attending: HOSPITALIST
Payer: MEDICAID

## 2019-07-16 PROBLEM — N39.0 UTI (URINARY TRACT INFECTION): Status: ACTIVE | Noted: 2019-07-16

## 2019-07-16 PROBLEM — D64.9 ANEMIA: Status: ACTIVE | Noted: 2019-07-16

## 2019-07-16 PROBLEM — Z87.898 HISTORY OF ALCOHOL USE: Status: ACTIVE | Noted: 2019-07-15

## 2019-07-16 PROBLEM — I95.9 HYPOTENSION: Status: ACTIVE | Noted: 2019-07-16

## 2019-07-16 PROBLEM — F10.90 HEAVY ALCOHOL USE: Status: ACTIVE | Noted: 2019-07-16

## 2019-07-16 LAB
25(OH)D3 SERPL-MCNC: 30.2 NG/ML (ref 30–100)
ALBUMIN SERPL-MCNC: 2.5 G/DL (ref 3.4–5)
ALBUMIN/GLOB SERPL: 1 {RATIO} (ref 0.8–1.7)
ALP SERPL-CCNC: 59 U/L (ref 45–117)
ALT SERPL-CCNC: 61 U/L (ref 13–56)
AMPHET UR QL SCN: NEGATIVE
AMYLASE SERPL-CCNC: 33 U/L (ref 25–115)
ANION GAP SERPL CALC-SCNC: 10 MMOL/L (ref 3–18)
ANION GAP SERPL CALC-SCNC: 10 MMOL/L (ref 3–18)
ANION GAP SERPL CALC-SCNC: 11 MMOL/L (ref 3–18)
ANION GAP SERPL CALC-SCNC: 11 MMOL/L (ref 3–18)
ANION GAP SERPL CALC-SCNC: 8 MMOL/L (ref 3–18)
APAP SERPL-MCNC: <2 UG/ML (ref 10–30)
APTT PPP: 29.8 SEC (ref 23–36.4)
ARTERIAL PATENCY WRIST A: YES
AST SERPL-CCNC: 83 U/L (ref 15–37)
BACTERIA SPEC CULT: NORMAL
BACTERIA SPEC CULT: NORMAL
BARBITURATES UR QL SCN: NEGATIVE
BASE DEFICIT BLD-SCNC: 9 MMOL/L
BASOPHILS # BLD: 0 K/UL (ref 0–0.1)
BASOPHILS # BLD: 0 K/UL (ref 0–0.1)
BASOPHILS NFR BLD: 0 % (ref 0–2)
BASOPHILS NFR BLD: 0 % (ref 0–2)
BDY SITE: ABNORMAL
BENZODIAZ UR QL: NEGATIVE
BILIRUB DIRECT SERPL-MCNC: 0.3 MG/DL (ref 0–0.2)
BILIRUB SERPL-MCNC: 0.7 MG/DL (ref 0.2–1)
BODY TEMPERATURE: 98.6
BUN SERPL-MCNC: 41 MG/DL (ref 7–18)
BUN SERPL-MCNC: 44 MG/DL (ref 7–18)
BUN SERPL-MCNC: 44 MG/DL (ref 7–18)
BUN SERPL-MCNC: 46 MG/DL (ref 7–18)
BUN SERPL-MCNC: 50 MG/DL (ref 7–18)
BUN/CREAT SERPL: 11 (ref 12–20)
BUN/CREAT SERPL: 12 (ref 12–20)
BUN/CREAT SERPL: 12 (ref 12–20)
CA-I SERPL-SCNC: 1.07 MMOL/L (ref 1.12–1.32)
CALCIUM SERPL-MCNC: 7 MG/DL (ref 8.5–10.1)
CALCIUM SERPL-MCNC: 8.1 MG/DL (ref 8.5–10.1)
CALCIUM SERPL-MCNC: 8.3 MG/DL (ref 8.5–10.1)
CALCIUM SERPL-MCNC: 8.4 MG/DL (ref 8.5–10.1)
CALCIUM SERPL-MCNC: 8.5 MG/DL (ref 8.5–10.1)
CANNABINOIDS UR QL SCN: NEGATIVE
CHLORIDE SERPL-SCNC: 103 MMOL/L (ref 100–108)
CHLORIDE SERPL-SCNC: 103 MMOL/L (ref 100–108)
CHLORIDE SERPL-SCNC: 104 MMOL/L (ref 100–108)
CHLORIDE SERPL-SCNC: 105 MMOL/L (ref 100–108)
CHLORIDE SERPL-SCNC: 105 MMOL/L (ref 100–108)
CO2 SERPL-SCNC: 19 MMOL/L (ref 21–32)
CO2 SERPL-SCNC: 20 MMOL/L (ref 21–32)
CO2 SERPL-SCNC: 20 MMOL/L (ref 21–32)
CO2 SERPL-SCNC: 21 MMOL/L (ref 21–32)
CO2 SERPL-SCNC: 21 MMOL/L (ref 21–32)
COCAINE UR QL SCN: NEGATIVE
CREAT SERPL-MCNC: 3.51 MG/DL (ref 0.6–1.3)
CREAT SERPL-MCNC: 3.62 MG/DL (ref 0.6–1.3)
CREAT SERPL-MCNC: 3.89 MG/DL (ref 0.6–1.3)
CREAT SERPL-MCNC: 4.2 MG/DL (ref 0.6–1.3)
CREAT SERPL-MCNC: 4.46 MG/DL (ref 0.6–1.3)
CRP SERPL-MCNC: 0.9 MG/DL (ref 0–0.3)
DIFFERENTIAL METHOD BLD: ABNORMAL
DIFFERENTIAL METHOD BLD: ABNORMAL
EOSINOPHIL # BLD: 0.1 K/UL (ref 0–0.4)
EOSINOPHIL # BLD: 0.1 K/UL (ref 0–0.4)
EOSINOPHIL NFR BLD: 1 % (ref 0–5)
EOSINOPHIL NFR BLD: 2 % (ref 0–5)
ERYTHROCYTE [DISTWIDTH] IN BLOOD BY AUTOMATED COUNT: 13.7 % (ref 11.6–14.5)
ERYTHROCYTE [DISTWIDTH] IN BLOOD BY AUTOMATED COUNT: 13.9 % (ref 11.6–14.5)
GAS FLOW.O2 O2 DELIVERY SYS: ABNORMAL L/MIN
GLOBULIN SER CALC-MCNC: 2.6 G/DL (ref 2–4)
GLUCOSE BLD STRIP.AUTO-MCNC: 89 MG/DL (ref 70–110)
GLUCOSE SERPL-MCNC: 103 MG/DL (ref 74–99)
GLUCOSE SERPL-MCNC: 77 MG/DL (ref 74–99)
GLUCOSE SERPL-MCNC: 79 MG/DL (ref 74–99)
GLUCOSE SERPL-MCNC: 89 MG/DL (ref 74–99)
GLUCOSE SERPL-MCNC: 96 MG/DL (ref 74–99)
HAV IGM SER QL: NEGATIVE
HBV CORE IGM SER QL: NEGATIVE
HBV SURFACE AB SER QL IA: NEGATIVE
HBV SURFACE AB SERPL IA-ACNC: <3.1 MIU/ML
HBV SURFACE AG SER QL: 0.16 INDEX
HBV SURFACE AG SER QL: NEGATIVE
HCO3 BLD-SCNC: 16.6 MMOL/L (ref 22–26)
HCT VFR BLD AUTO: 21.6 % (ref 35–45)
HCT VFR BLD AUTO: 22.1 % (ref 35–45)
HCT VFR BLD AUTO: 24.4 % (ref 35–45)
HCV AB SER IA-ACNC: 0.03 INDEX
HCV AB SERPL QL IA: NEGATIVE
HCV COMMENT,HCGAC: NORMAL
HDSCOM,HDSCOM: NORMAL
HEP BS AB COMMENT,HBSAC: ABNORMAL
HGB BLD-MCNC: 7.6 G/DL (ref 12–16)
HGB BLD-MCNC: 7.8 G/DL (ref 12–16)
HGB BLD-MCNC: 8.4 G/DL (ref 12–16)
INR PPP: 1.1 (ref 0.8–1.2)
IRON SATN MFR SERPL: 94 %
IRON SERPL-MCNC: 219 UG/DL (ref 50–175)
LIPASE SERPL-CCNC: 161 U/L (ref 73–393)
LYMPHOCYTES # BLD: 1.1 K/UL (ref 0.9–3.6)
LYMPHOCYTES # BLD: 1.1 K/UL (ref 0.9–3.6)
LYMPHOCYTES NFR BLD: 18 % (ref 21–52)
LYMPHOCYTES NFR BLD: 19 % (ref 21–52)
MAGNESIUM SERPL-MCNC: 1.5 MG/DL (ref 1.6–2.6)
MAGNESIUM SERPL-MCNC: 1.6 MG/DL (ref 1.6–2.6)
MAGNESIUM SERPL-MCNC: 1.9 MG/DL (ref 1.6–2.6)
MAGNESIUM SERPL-MCNC: 2.1 MG/DL (ref 1.6–2.6)
MAGNESIUM SERPL-MCNC: 2.2 MG/DL (ref 1.6–2.6)
MCH RBC QN AUTO: 35.6 PG (ref 24–34)
MCH RBC QN AUTO: 36 PG (ref 24–34)
MCHC RBC AUTO-ENTMCNC: 34.4 G/DL (ref 31–37)
MCHC RBC AUTO-ENTMCNC: 35.2 G/DL (ref 31–37)
MCV RBC AUTO: 102.4 FL (ref 74–97)
MCV RBC AUTO: 103.4 FL (ref 74–97)
METHADONE UR QL: NEGATIVE
MONOCYTES # BLD: 0.3 K/UL (ref 0.05–1.2)
MONOCYTES # BLD: 0.4 K/UL (ref 0.05–1.2)
MONOCYTES NFR BLD: 6 % (ref 3–10)
MONOCYTES NFR BLD: 6 % (ref 3–10)
NEUTS SEG # BLD: 4.2 K/UL (ref 1.8–8)
NEUTS SEG # BLD: 4.5 K/UL (ref 1.8–8)
NEUTS SEG NFR BLD: 73 % (ref 40–73)
NEUTS SEG NFR BLD: 75 % (ref 40–73)
O2/TOTAL GAS SETTING VFR VENT: 21 %
OPIATES UR QL: NEGATIVE
PCO2 BLD: 32 MMHG (ref 35–45)
PCP UR QL: NEGATIVE
PH BLD: 7.32 [PH] (ref 7.35–7.45)
PHOSPHATE SERPL-MCNC: 2.6 MG/DL (ref 2.5–4.9)
PHOSPHATE SERPL-MCNC: 2.6 MG/DL (ref 2.5–4.9)
PHOSPHATE SERPL-MCNC: 3.3 MG/DL (ref 2.5–4.9)
PHOSPHATE SERPL-MCNC: 3.5 MG/DL (ref 2.5–4.9)
PHOSPHATE SERPL-MCNC: 3.8 MG/DL (ref 2.5–4.9)
PLATELET # BLD AUTO: 105 K/UL (ref 135–420)
PLATELET # BLD AUTO: 133 K/UL (ref 135–420)
PMV BLD AUTO: 9.5 FL (ref 9.2–11.8)
PMV BLD AUTO: 9.9 FL (ref 9.2–11.8)
PO2 BLD: 95 MMHG (ref 80–100)
POTASSIUM SERPL-SCNC: 3.6 MMOL/L (ref 3.5–5.5)
POTASSIUM SERPL-SCNC: 3.7 MMOL/L (ref 3.5–5.5)
POTASSIUM SERPL-SCNC: 3.7 MMOL/L (ref 3.5–5.5)
POTASSIUM SERPL-SCNC: 3.9 MMOL/L (ref 3.5–5.5)
POTASSIUM SERPL-SCNC: 3.9 MMOL/L (ref 3.5–5.5)
PROT SERPL-MCNC: 5.1 G/DL (ref 6.4–8.2)
PROTHROMBIN TIME: 14.1 SEC (ref 11.5–15.2)
RBC # BLD AUTO: 2.11 M/UL (ref 4.2–5.3)
RBC # BLD AUTO: 2.36 M/UL (ref 4.2–5.3)
RPR SER QL: NONREACTIVE
SALICYLATES SERPL-MCNC: <1.7 MG/DL (ref 2.8–20)
SAO2 % BLD: 97 % (ref 92–97)
SERVICE CMNT-IMP: ABNORMAL
SERVICE CMNT-IMP: NORMAL
SERVICE CMNT-IMP: NORMAL
SODIUM SERPL-SCNC: 133 MMOL/L (ref 136–145)
SODIUM SERPL-SCNC: 134 MMOL/L (ref 136–145)
SODIUM SERPL-SCNC: 136 MMOL/L (ref 136–145)
SP1: NORMAL
SP2: NORMAL
SP3: NORMAL
SPECIMEN TYPE: ABNORMAL
TIBC SERPL-MCNC: 232 UG/DL (ref 250–450)
TOTAL RESP. RATE, ITRR: 23
TRIGL SERPL-MCNC: 241 MG/DL (ref ?–150)
VANCOMYCIN SERPL-MCNC: 18.4 UG/ML (ref 5–40)
WBC # BLD AUTO: 5.8 K/UL (ref 4.6–13.2)
WBC # BLD AUTO: 6 K/UL (ref 4.6–13.2)

## 2019-07-16 PROCEDURE — 84478 ASSAY OF TRIGLYCERIDES: CPT

## 2019-07-16 PROCEDURE — 74011250636 HC RX REV CODE- 250/636: Performed by: EMERGENCY MEDICINE

## 2019-07-16 PROCEDURE — 87641 MR-STAPH DNA AMP PROBE: CPT

## 2019-07-16 PROCEDURE — 80076 HEPATIC FUNCTION PANEL: CPT

## 2019-07-16 PROCEDURE — 74011000258 HC RX REV CODE- 258: Performed by: INTERNAL MEDICINE

## 2019-07-16 PROCEDURE — 86140 C-REACTIVE PROTEIN: CPT

## 2019-07-16 PROCEDURE — 80048 BASIC METABOLIC PNL TOTAL CA: CPT

## 2019-07-16 PROCEDURE — 83735 ASSAY OF MAGNESIUM: CPT

## 2019-07-16 PROCEDURE — 84100 ASSAY OF PHOSPHORUS: CPT

## 2019-07-16 PROCEDURE — 82803 BLOOD GASES ANY COMBINATION: CPT

## 2019-07-16 PROCEDURE — 74011000258 HC RX REV CODE- 258: Performed by: EMERGENCY MEDICINE

## 2019-07-16 PROCEDURE — 83540 ASSAY OF IRON: CPT

## 2019-07-16 PROCEDURE — 74011000258 HC RX REV CODE- 258: Performed by: NURSE PRACTITIONER

## 2019-07-16 PROCEDURE — 74011250636 HC RX REV CODE- 250/636: Performed by: NURSE PRACTITIONER

## 2019-07-16 PROCEDURE — 74011000258 HC RX REV CODE- 258: Performed by: HOSPITALIST

## 2019-07-16 PROCEDURE — 85610 PROTHROMBIN TIME: CPT

## 2019-07-16 PROCEDURE — 85018 HEMOGLOBIN: CPT

## 2019-07-16 PROCEDURE — 89055 LEUKOCYTE ASSESSMENT FECAL: CPT

## 2019-07-16 PROCEDURE — 74011250636 HC RX REV CODE- 250/636: Performed by: INTERNAL MEDICINE

## 2019-07-16 PROCEDURE — 74011250637 HC RX REV CODE- 250/637: Performed by: HOSPITALIST

## 2019-07-16 PROCEDURE — 36415 COLL VENOUS BLD VENIPUNCTURE: CPT

## 2019-07-16 PROCEDURE — 82272 OCCULT BLD FECES 1-3 TESTS: CPT

## 2019-07-16 PROCEDURE — 87045 FECES CULTURE AEROBIC BACT: CPT

## 2019-07-16 PROCEDURE — 85730 THROMBOPLASTIN TIME PARTIAL: CPT

## 2019-07-16 PROCEDURE — 80202 ASSAY OF VANCOMYCIN: CPT

## 2019-07-16 PROCEDURE — 86038 ANTINUCLEAR ANTIBODIES: CPT

## 2019-07-16 PROCEDURE — 87177 OVA AND PARASITES SMEARS: CPT

## 2019-07-16 PROCEDURE — 82150 ASSAY OF AMYLASE: CPT

## 2019-07-16 PROCEDURE — 83516 IMMUNOASSAY NONANTIBODY: CPT

## 2019-07-16 PROCEDURE — 85025 COMPLETE CBC W/AUTO DIFF WBC: CPT

## 2019-07-16 PROCEDURE — 86663 EPSTEIN-BARR ANTIBODY: CPT

## 2019-07-16 PROCEDURE — 76700 US EXAM ABDOM COMPLETE: CPT

## 2019-07-16 PROCEDURE — 83519 RIA NONANTIBODY: CPT

## 2019-07-16 PROCEDURE — 87329 GIARDIA AG IA: CPT

## 2019-07-16 PROCEDURE — 36600 WITHDRAWAL OF ARTERIAL BLOOD: CPT

## 2019-07-16 PROCEDURE — 65660000000 HC RM CCU STEPDOWN

## 2019-07-16 PROCEDURE — 74011250636 HC RX REV CODE- 250/636: Performed by: HOSPITALIST

## 2019-07-16 PROCEDURE — 74011250636 HC RX REV CODE- 250/636

## 2019-07-16 PROCEDURE — 82962 GLUCOSE BLOOD TEST: CPT

## 2019-07-16 PROCEDURE — 80307 DRUG TEST PRSMV CHEM ANLYZR: CPT

## 2019-07-16 PROCEDURE — 82330 ASSAY OF CALCIUM: CPT

## 2019-07-16 PROCEDURE — 83690 ASSAY OF LIPASE: CPT

## 2019-07-16 RX ORDER — MAGNESIUM SULFATE HEPTAHYDRATE 40 MG/ML
2 INJECTION, SOLUTION INTRAVENOUS ONCE
Status: COMPLETED | OUTPATIENT
Start: 2019-07-16 | End: 2019-07-16

## 2019-07-16 RX ORDER — SODIUM CHLORIDE 9 MG/ML
60 INJECTION, SOLUTION INTRAVENOUS CONTINUOUS
Status: DISPENSED | OUTPATIENT
Start: 2019-07-16 | End: 2019-07-18

## 2019-07-16 RX ORDER — SODIUM CHLORIDE 450 MG/100ML
125 INJECTION, SOLUTION INTRAVENOUS CONTINUOUS
Status: DISCONTINUED | OUTPATIENT
Start: 2019-07-16 | End: 2019-07-16

## 2019-07-16 RX ORDER — SODIUM CHLORIDE, SODIUM LACTATE, POTASSIUM CHLORIDE, CALCIUM CHLORIDE 600; 310; 30; 20 MG/100ML; MG/100ML; MG/100ML; MG/100ML
100 INJECTION, SOLUTION INTRAVENOUS CONTINUOUS
Status: DISCONTINUED | OUTPATIENT
Start: 2019-07-16 | End: 2019-07-16

## 2019-07-16 RX ORDER — HEPARIN SODIUM 5000 [USP'U]/ML
5000 INJECTION, SOLUTION INTRAVENOUS; SUBCUTANEOUS EVERY 8 HOURS
Status: DISCONTINUED | OUTPATIENT
Start: 2019-07-16 | End: 2019-07-19 | Stop reason: HOSPADM

## 2019-07-16 RX ORDER — MAGNESIUM SULFATE HEPTAHYDRATE 40 MG/ML
INJECTION, SOLUTION INTRAVENOUS
Status: COMPLETED
Start: 2019-07-16 | End: 2019-07-16

## 2019-07-16 RX ORDER — SODIUM CHLORIDE 9 MG/ML
500 INJECTION, SOLUTION INTRAVENOUS ONCE
Status: COMPLETED | OUTPATIENT
Start: 2019-07-16 | End: 2019-07-16

## 2019-07-16 RX ORDER — SODIUM CHLORIDE 450 MG/100ML
80 INJECTION, SOLUTION INTRAVENOUS CONTINUOUS
Status: DISCONTINUED | OUTPATIENT
Start: 2019-07-16 | End: 2019-07-16

## 2019-07-16 RX ADMIN — Medication 10 ML: at 14:03

## 2019-07-16 RX ADMIN — HEPARIN SODIUM 5000 UNITS: 5000 INJECTION INTRAVENOUS; SUBCUTANEOUS at 14:08

## 2019-07-16 RX ADMIN — CALCIUM GLUCONATE 2 G: 98 INJECTION, SOLUTION INTRAVENOUS at 01:21

## 2019-07-16 RX ADMIN — THIAMINE HYDROCHLORIDE 500 MG: 100 INJECTION, SOLUTION INTRAMUSCULAR; INTRAVENOUS at 16:11

## 2019-07-16 RX ADMIN — HEPARIN SODIUM 5000 UNITS: 5000 INJECTION INTRAVENOUS; SUBCUTANEOUS at 10:09

## 2019-07-16 RX ADMIN — Medication 10 ML: at 21:04

## 2019-07-16 RX ADMIN — SODIUM CHLORIDE 80 ML/HR: 450 INJECTION, SOLUTION INTRAVENOUS at 01:21

## 2019-07-16 RX ADMIN — SODIUM CHLORIDE 500 ML: 900 INJECTION, SOLUTION INTRAVENOUS at 21:53

## 2019-07-16 RX ADMIN — PIPERACILLIN SODIUM,TAZOBACTAM SODIUM 2.25 G: 2; .25 INJECTION, POWDER, FOR SOLUTION INTRAVENOUS at 03:38

## 2019-07-16 RX ADMIN — PIPERACILLIN SODIUM,TAZOBACTAM SODIUM 2.25 G: 2; .25 INJECTION, POWDER, FOR SOLUTION INTRAVENOUS at 10:12

## 2019-07-16 RX ADMIN — SODIUM CHLORIDE 80 ML/HR: 450 INJECTION, SOLUTION INTRAVENOUS at 16:09

## 2019-07-16 RX ADMIN — VANCOMYCIN HYDROCHLORIDE 500 MG: 500 INJECTION, POWDER, LYOPHILIZED, FOR SOLUTION INTRAVENOUS at 14:09

## 2019-07-16 RX ADMIN — Medication 10 ML: at 14:04

## 2019-07-16 RX ADMIN — PIPERACILLIN SODIUM,TAZOBACTAM SODIUM 2.25 G: 2; .25 INJECTION, POWDER, FOR SOLUTION INTRAVENOUS at 17:33

## 2019-07-16 RX ADMIN — Medication 10 ML: at 21:03

## 2019-07-16 RX ADMIN — CALCIUM GLUCONATE 1 G: 98 INJECTION, SOLUTION INTRAVENOUS at 08:39

## 2019-07-16 RX ADMIN — Medication 2 CAPSULE: at 12:00

## 2019-07-16 RX ADMIN — THIAMINE HYDROCHLORIDE 500 MG: 100 INJECTION, SOLUTION INTRAMUSCULAR; INTRAVENOUS at 08:42

## 2019-07-16 RX ADMIN — SODIUM CHLORIDE 125 ML/HR: 900 INJECTION, SOLUTION INTRAVENOUS at 21:54

## 2019-07-16 RX ADMIN — MAGNESIUM SULFATE HEPTAHYDRATE 2 G: 40 INJECTION, SOLUTION INTRAVENOUS at 10:15

## 2019-07-16 RX ADMIN — HEPARIN SODIUM 5000 UNITS: 5000 INJECTION INTRAVENOUS; SUBCUTANEOUS at 21:50

## 2019-07-16 RX ADMIN — Medication 2 CAPSULE: at 08:41

## 2019-07-16 NOTE — CONSULTS
[facility]  Pulmonary, Critical Care, and Sleep Medicine    Name: Merlyn Sherman MRN: [id number]   : 1965 Hospital: [address] [place] [address]   Date: 2019        Critical Care initial Consult      IMPRESSION:   · Hypovolemic Hyponatremia likely 2' to diarrhea for 3-4 days per patient report with associate n/v  · Hypotension 2' to above - improved with fluid recusitation  · Abdominal tenderness   · Elevated Lipase, ? Pancreatitis   · Elevated Transiminases  · SUYAPA likely 2' to #1 - creatinine 7.05 on admission improving - baseline 0.7-1.8  · Hypocalcemia - replacing  · Lactic acidosis on admission - resolved  · Hx HTN     Patient Active Problem List   Diagnosis Code    HTN (hypertension) I10    Alcohol abuse counseling and surveillance Z71.41    Alcohol use disorder, severe, dependence ([facility] [address] F10.20    Dehydration E86.0    Acute renal failure (ARF) ([facility] [address] N17.9    Hyponatremia E87.1    History of alcohol use Z87.898      RECOMMENDATIONS:   · Resp: Tolerating room air, PRN NC for goal SpO2 >93%;   · I/D: Afebrile; aleukocytosis, sepsis protocol initiated in ED. Recent diarrhea for 4 days. ? Infectious? Culture blood urine and stool for cx ova/para and wbc's. Send MRSA nasal screen, Zosyn/Levaquin started in ED, de-escalate. Abx once Cx's finalize. · Hem/Onc: Daily CBC; H/H, and plts are stable  · CVS: Hypotension improved with fluid resuscitation, not on pressors, continue to monitor. · Metabolic: Daily BMP; monitor e-lytes; replace PRN, Ca++ and Mag replaced. · Renal: Trend Renal indices; SUYAPA improving, making urine, I&Os may use pur wick, Nephrology consulted and following. · Endocrine: Glycemic control as needed. · GI: SUP, Trend LFTs, Zofran PRN for N/V, clear liquids only as patient tolerates.   · Musc/Skin: No acute issues  · Neuro: PRN pain medications, +/- sedation  · Fluids: 1/2 NS @ 80 ml/hr, banana bag daily  · Code Status: Full code     Best practice:  · Sepsis Bundle per Hospital Protocol  · Glycemic control; avoid Hypoglycemia  · DVT prophylaxis. Heparin SC  · Need for Lines, montes assessed. ·   Subjective/History: This patient has been seen and evaluated at the request of Dr. Dontae Hooper for hypotension. 07/16/19    Patient is a 46 y.o. female with a history of hypertension, psychiatric disorder, homelessness and diabetes. who presented to the ED presenting with nausea, vomiting, diarrhea for the past 4 days. Associated n/v and inability to eat/drink. No fever or chills. Due to excessive diarrhea patient stated that she went to [facility] and bought imodium and took 4 pills in order to stop her diarrhea. As she drove her bike back to where she rests, she became lightheaded and dizzy and called a friend to get a ride to [facility]. Patient denies recent alcohol use or any illicit drug use, does not smoke. States that she does have HTN and was still taking her hctz despite the diarrhea for several days. In ED patient was hypotensive, labs showed SUYAPA with creatinine of 7.05, Lactic acidosis, elevated lipase of 462, sodium initially 122. Patient fluid resuscitated with 4 L NS and 1L banana bag. Nephrology consulted for SUYAPA. Patient remained hypotensive and PCCM was consulted. At present patient is awake alert and oriented. Follows commands,  Sodium levels increased to 134 after fluid recusitation, IV was changed to 1/2 NS to decrease rise of sodium by nephrology - will follow to prevent any further rise and this was a rapid increase over 7 hours. Further rise in the next 6- 8hrs may warrant intervention with DDAVP, will defer to nephrology. ABG shows metabolic acidosis with pH of 7.32 and HCO3 of 16.6  Abdominal exam is + for distension, non rigid, with generalized tenderness upon palpation. Will get abdominal u/s in the am, renal u/s as well per nephology recommendations. Transfer patient to ICU for continued management and observation.      Past Medical History: Diagnosis Date    Acute renal failure (ARF) ([facility] [address] 7/15/2019    Hypertension     Hyponatremia 7/15/2019    Other ill-defined conditions(799.89)     high cholesterol    Psychiatric disorder         History reviewed. No pertinent surgical history. Prior to Admission medications    Medication Sig Start Date End Date Taking? Authorizing Provider   traZODone (DESYREL) 100 mg tablet Take 1 Tab by mouth nightly. 2/20/17   Sabrina Camargo NP   hydroCHLOROthiazide (HYDRODIURIL) 25 mg tablet Take 1 Tab by mouth daily. Indications: Edema, hypertension 2/20/17   Angela BARDALES NP   omega-3 acid ethyl esters (LOVAZA) 1 gram capsule Take 2 Caps by mouth two (2) times a day. 11/10/16   Angela BARDALES NP   multivitamin (ONE A DAY) tablet Take 1 Tab by mouth daily. 11/10/16   Angela BARDALES NP       Current Facility-Administered Medications   Medication Dose Route Frequency    0.45% sodium chloride infusion  80 mL/hr IntraVENous CONTINUOUS    calcium gluconate 1 g in 0.9% sodium chloride 100 mL IVPB  1 g IntraVENous ONCE    pantoprazole (PROTONIX) 40 mg in sodium chloride 0.9% 10 mL injection  40 mg IntraVENous DAILY    heparin (porcine) injection 5,000 Units  5,000 Units SubCUTAneous Q8H    [START ON 7/17/2019] levoFLOXacin (LEVAQUIN) 500 mg in D5W IVPB  500 mg IntraVENous Q48H    piperacillin-tazobactam (ZOSYN) 2.25 g in 0.9% sodium chloride (MBP/ADV) 50 mL MBP  2.25 g IntraVENous Q8H    VANCOMYCIN INFORMATION NOTE   Other CONTINUOUS    0.9% sodium chloride 1,000 mL with mvi, adult no. 4 with vit K 10 mL, thiamine 665 mg, folic acid 1 mg infusion   IntraVENous Q24H    amino acids/multivit with iron (CHAPIS-RECOVER) capsule 2 Cap  2 Cap Oral TID WITH MEALS    sodium chloride (NS) flush 5-40 mL  5-40 mL IntraVENous Q8H    [START ON 7/18/2019] thiamine (B-1) 250 mg in 0.9% sodium chloride 50 mL IVPB  250 mg IntraVENous DAILY    sodium chloride (NS) flush 5-40 mL  5-40 mL IntraVENous Q8H    thiamine (B-1) 500 mg in 0.9% sodium chloride 50 mL IVPB  500 mg IntraVENous Q8H       Allergies   Allergen Reactions    Sulfa (Sulfonamide Antibiotics) Rash        Social History     Tobacco Use    Smoking status: Never Smoker    Smokeless tobacco: Never Used   Substance Use Topics    Alcohol use: Yes        Family History   Problem Relation Age of Onset    Stroke Mother     Diabetes Mother     Hypertension Father     Diabetes Father     Cancer Paternal Uncle           Review of Systems:  Pertinent items are noted in HPI. Objective:   Vital Signs:    Visit Vitals  /85   Pulse 77   Temp 98 °F (36.7 °C)   Resp 13   Ht 5' 1\" (1.549 m)   Wt 78.3 kg (172 lb 9.9 oz)   SpO2 100%   BMI 32.62 kg/m²       O2 Device: Room air       Temp (24hrs), Av.6 °F (36.4 °C), Min:96.8 °F (36 °C), Max:98 °F (36.7 °C)       Intake/Output:   Last shift:      07/15 1901 -  07  In: -   Out: 800 [Urine:800]  Last 3 shifts: 701 - 07/15 1900  In: -   Out: 650 [Urine:650]    Intake/Output Summary (Last 24 hours) at 2019 0700  Last data filed at 2019 0515  Gross per 24 hour   Intake    Output 1450 ml   Net -1450 ml     Physical Exam:     General:  Alert, cooperative, NAD   Head:  Normocephalic, without obvious abnormality, atraumatic. Eyes:  Conjunctivae/corneas clear. PERRLA   Nose: Nares normal.  Mucosa dry. No drainage or sinus tenderness. Throat: Lips, mucosa, and tongue normal. Teeth and gums normal.   Neck: Supple, symmetrical, trachea midline, no adenopathy, no carotid bruit and no JVD. Lungs:   Symmetrical chest rise; good AE bilat; CTAB; no wheezes/rhonchi/rales noted. Heart:  RRR, S1, S2 normal, no m/r/g   Abdomen:   Distended, non rigid, + generalized tenderness upon palpation, bowel sounds normal. No masses,  No organomegaly. Extremities: Extremities normal, atraumatic, no cyanosis or edema. Pulses: 2+ and symmetric all extremities.    Skin: Skin color, texture, turgor normal. No rashes or lesions   Neurologic: Grossly non-focal, follows commands and moves all extremities appropriately. Devices: PIVs       Data:     Recent Results (from the past 24 hour(s))   EKG, 12 LEAD, INITIAL    Collection Time: 07/15/19  3:11 PM   Result Value Ref Range    Ventricular Rate 92 BPM    Atrial Rate 92 BPM    P-R Interval 178 ms    QRS Duration 82 ms    Q-T Interval 364 ms    QTC Calculation (Bezet) 450 ms    Calculated P Axis 63 degrees    Calculated R Axis 25 degrees    Calculated T Axis 46 degrees    Diagnosis       Normal sinus rhythm  Normal ECG  When compared with ECG of 24-FEB-2015 15:41,  No significant change was found  Confirmed by Laya Mckeon MD, ----- ([id number]) on 7/15/2019 4:39:44 PM     CBC WITH AUTOMATED DIFF    Collection Time: 07/15/19  3:26 PM   Result Value Ref Range    WBC 9.8 4.6 - 13.2 K/uL    RBC 2.88 (L) 4.20 - 5.30 M/uL    HGB 10.3 (L) 12.0 - 16.0 g/dL    HCT 29.1 (L) 35.0 - 45.0 %    .0 (H) 74.0 - 97.0 FL    MCH 35.8 (H) 24.0 - 34.0 PG    MCHC 35.4 31.0 - 37.0 g/dL    RDW 13.6 11.6 - 14.5 %    PLATELET 926 788 - 624 K/uL    MPV 10.3 9.2 - 11.8 FL    NEUTROPHILS 81 (H) 40 - 73 %    LYMPHOCYTES 13 (L) 21 - 52 %    MONOCYTES 6 3 - 10 %    EOSINOPHILS 0 0 - 5 %    BASOPHILS 0 0 - 2 %    ABS. NEUTROPHILS 8.0 1.8 - 8.0 K/UL    ABS. LYMPHOCYTES 1.3 0.9 - 3.6 K/UL    ABS. MONOCYTES 0.6 0.05 - 1.2 K/UL    ABS. EOSINOPHILS 0.0 0.0 - 0.4 K/UL    ABS.  BASOPHILS 0.0 0.0 - 0.1 K/UL    DF AUTOMATED     METABOLIC PANEL, BASIC    Collection Time: 07/15/19  3:26 PM   Result Value Ref Range    Sodium 122 (L) 136 - 145 mmol/L    Potassium 3.4 (L) 3.5 - 5.5 mmol/L    Chloride 82 (L) 100 - 108 mmol/L    CO2 26 21 - 32 mmol/L    Anion gap 14 3.0 - 18 mmol/L    Glucose 112 (H) 74 - 99 mg/dL    BUN 61 (H) 7.0 - 18 MG/DL    Creatinine 7.05 (H) 0.6 - 1.3 MG/DL    BUN/Creatinine ratio 9 (L) 12 - 20      GFR est AA 7 (L) >60 ml/min/1.73m2    GFR est non-AA 6 (L) >60 ml/min/1.73m2    Calcium 8.5 8.5 - 10.1 MG/DL   HEPATIC FUNCTION PANEL    Collection Time: 07/15/19  3:26 PM   Result Value Ref Range    Protein, total 8.3 (H) 6.4 - 8.2 g/dL    Albumin 4.1 3.4 - 5.0 g/dL    Globulin 4.2 (H) 2.0 - 4.0 g/dL    A-G Ratio 1.0 0.8 - 1.7      Bilirubin, total 1.1 (H) 0.2 - 1.0 MG/DL    Bilirubin, direct 0.3 (H) 0.0 - 0.2 MG/DL    Alk.  phosphatase 100 45 - 117 U/L    AST (SGOT) 150 (H) 15 - 37 U/L    ALT (SGPT) 108 (H) 13 - 56 U/L   LIPASE    Collection Time: 07/15/19  3:26 PM   Result Value Ref Range    Lipase 462 (H) 73 - 393 U/L   URINALYSIS W/ RFLX MICROSCOPIC    Collection Time: 07/15/19  3:26 PM   Result Value Ref Range    Color YELLOW      Appearance TURBID      Specific gravity 1.018 1.005 - 1.030      pH (UA) 5.0 5.0 - 8.0      Protein 30 (A) NEG mg/dL    Glucose NEGATIVE  NEG mg/dL    Ketone TRACE (A) NEG mg/dL    Bilirubin NEGATIVE  NEG      Blood NEGATIVE  NEG      Urobilinogen 1.0 0.2 - 1.0 EU/dL    Nitrites NEGATIVE  NEG      Leukocyte Esterase SMALL (A) NEG     URINE MICROSCOPIC ONLY    Collection Time: 07/15/19  3:26 PM   Result Value Ref Range    WBC 4 to 10 0 - 4 /hpf    RBC 0 to 3 0 - 5 /hpf    Epithelial cells 3+ 0 - 5 /lpf    Bacteria 2+ (A) NEG /hpf    Hyaline cast 4 to 10 0 - 2 /lpf   CK    Collection Time: 07/15/19  3:26 PM   Result Value Ref Range     26 - 192 U/L   MAGNESIUM    Collection Time: 07/15/19  3:26 PM   Result Value Ref Range    Magnesium 1.7 1.6 - 2.6 mg/dL   PHOSPHORUS    Collection Time: 07/15/19  3:26 PM   Result Value Ref Range    Phosphorus 6.9 (H) 2.5 - 4.9 MG/DL   ETHYL ALCOHOL    Collection Time: 07/15/19  3:26 PM   Result Value Ref Range    ALCOHOL(ETHYL),SERUM <3 0 - 3 MG/DL   VITAMIN D, 25 HYDROXY    Collection Time: 07/15/19  3:26 PM   Result Value Ref Range    Vitamin D 25-Hydroxy 30.2 30 - 100 ng/mL   POC LACTIC ACID    Collection Time: 07/15/19  3:35 PM   Result Value Ref Range    Lactic Acid (POC) 2.29 (HH) 0.40 - 2.00 mmol/L   POC LACTIC ACID    Collection Time: 07/15/19  5:45 PM   Result Value Ref Range    Lactic Acid (POC) 1.43 0.40 - 0.32 mmol/L   METABOLIC PANEL, BASIC    Collection Time: 07/15/19 10:14 PM   Result Value Ref Range    Sodium 134 (L) 136 - 145 mmol/L    Potassium 4.0 3.5 - 5.5 mmol/L    Chloride 103 100 - 108 mmol/L    CO2 20 (L) 21 - 32 mmol/L    Anion gap 11 3.0 - 18 mmol/L    Glucose 87 74 - 99 mg/dL    BUN 49 (H) 7.0 - 18 MG/DL    Creatinine 4.64 (H) 0.6 - 1.3 MG/DL    BUN/Creatinine ratio 11 (L) 12 - 20      GFR est AA 12 (L) >60 ml/min/1.73m2    GFR est non-AA 10 (L) >60 ml/min/1.73m2    Calcium 5.9 (LL) 8.5 - 10.1 MG/DL   PHOSPHORUS    Collection Time: 07/15/19 10:14 PM   Result Value Ref Range    Phosphorus 3.8 2.5 - 4.9 MG/DL   HGB & HCT    Collection Time: 07/16/19  1:30 AM   Result Value Ref Range    HGB 7.8 (L) 12.0 - 16.0 g/dL    HCT 22.1 (L) 35.0 - 76.3 %   METABOLIC PANEL, BASIC    Collection Time: 07/16/19  4:05 AM   Result Value Ref Range    Sodium 134 (L) 136 - 145 mmol/L    Potassium 3.9 3.5 - 5.5 mmol/L    Chloride 104 100 - 108 mmol/L    CO2 19 (L) 21 - 32 mmol/L    Anion gap 11 3.0 - 18 mmol/L    Glucose 89 74 - 99 mg/dL    BUN 50 (H) 7.0 - 18 MG/DL    Creatinine 4.46 (H) 0.6 - 1.3 MG/DL    BUN/Creatinine ratio 11 (L) 12 - 20      GFR est AA 12 (L) >60 ml/min/1.73m2    GFR est non-AA 10 (L) >60 ml/min/1.73m2    Calcium 7.0 (L) 8.5 - 10.1 MG/DL   HEPATIC FUNCTION PANEL    Collection Time: 07/16/19  4:05 AM   Result Value Ref Range    Protein, total 5.1 (L) 6.4 - 8.2 g/dL    Albumin 2.5 (L) 3.4 - 5.0 g/dL    Globulin 2.6 2.0 - 4.0 g/dL    A-G Ratio 1.0 0.8 - 1.7      Bilirubin, total 0.7 0.2 - 1.0 MG/DL    Bilirubin, direct 0.3 (H) 0.0 - 0.2 MG/DL    Alk.  phosphatase 59 45 - 117 U/L    AST (SGOT) 83 (H) 15 - 37 U/L    ALT (SGPT) 61 (H) 13 - 56 U/L   LIPASE    Collection Time: 07/16/19  4:05 AM   Result Value Ref Range    Lipase 161 73 - 393 U/L   CBC WITH AUTOMATED DIFF    Collection Time: 07/16/19  4:05 AM   Result Value Ref Range    WBC 6.0 4.6 - 13.2 K/uL    RBC 2.11 (L) 4.20 - 5.30 M/uL    HGB 7.6 (L) 12.0 - 16.0 g/dL    HCT 21.6 (L) 35.0 - 45.0 %    .4 (H) 74.0 - 97.0 FL    MCH 36.0 (H) 24.0 - 34.0 PG    MCHC 35.2 31.0 - 37.0 g/dL    RDW 13.7 11.6 - 14.5 %    PLATELET 429 (L) 387 - 420 K/uL    MPV 9.5 9.2 - 11.8 FL    NEUTROPHILS 75 (H) 40 - 73 %    LYMPHOCYTES 18 (L) 21 - 52 %    MONOCYTES 6 3 - 10 %    EOSINOPHILS 1 0 - 5 %    BASOPHILS 0 0 - 2 %    ABS. NEUTROPHILS 4.5 1.8 - 8.0 K/UL    ABS. LYMPHOCYTES 1.1 0.9 - 3.6 K/UL    ABS. MONOCYTES 0.3 0.05 - 1.2 K/UL    ABS. EOSINOPHILS 0.1 0.0 - 0.4 K/UL    ABS.  BASOPHILS 0.0 0.0 - 0.1 K/UL    DF AUTOMATED     MAGNESIUM    Collection Time: 07/16/19  4:05 AM   Result Value Ref Range    Magnesium 1.5 (L) 1.6 - 2.6 mg/dL   SALICYLATE    Collection Time: 07/16/19  4:05 AM   Result Value Ref Range    Salicylate level <4.5 (L) 2.8 - 20.0 MG/DL   DRUG SCREEN, URINE    Collection Time: 07/16/19  4:05 AM   Result Value Ref Range    BENZODIAZEPINES NEGATIVE  NEG      BARBITURATES NEGATIVE  NEG      THC (TH-CANNABINOL) NEGATIVE  NEG      OPIATES NEGATIVE  NEG      PCP(PHENCYCLIDINE) NEGATIVE  NEG      COCAINE NEGATIVE  NEG      AMPHETAMINES NEGATIVE  NEG      METHADONE NEGATIVE  NEG      HDSCOM (NOTE)    PTT    Collection Time: 07/16/19  4:05 AM   Result Value Ref Range    aPTT 29.8 23.0 - 36.4 SEC   PROTHROMBIN TIME + INR    Collection Time: 07/16/19  4:05 AM   Result Value Ref Range    Prothrombin time 14.1 11.5 - 15.2 sec    INR 1.1 0.8 - 1.2     CALCIUM, IONIZED    Collection Time: 07/16/19  4:05 AM   Result Value Ref Range    Ionized Calcium 1.07 (L) 1.12 - 1.32 MMOL/L   ACETAMINOPHEN    Collection Time: 07/16/19  4:05 AM   Result Value Ref Range    Acetaminophen level <2 (L) 10.0 - 30.0 ug/mL   AMYLASE    Collection Time: 07/16/19  4:05 AM   Result Value Ref Range    Amylase 33 25 - 115 U/L   TRIGLYCERIDE    Collection Time: 07/16/19  4:05 AM   Result Value Ref Range    Triglyceride 241 (H) <150 MG/DL   POC G3    Collection Time: 07/16/19  4:58 AM   Result Value Ref Range    Device: ROOM AIR      FIO2 (POC) 21 %    pH (POC) 7.321 (L) 7.35 - 7.45      pCO2 (POC) 32.0 (L) 35.0 - 45.0 MMHG    pO2 (POC) 95 80 - 100 MMHG    HCO3 (POC) 16.6 (L) 22 - 26 MMOL/L    sO2 (POC) 97 92 - 97 %    Base deficit (POC) 9 mmol/L    Allens test (POC) YES      Total resp. rate 23      Site RIGHT RADIAL      Patient temp. 98.6      Specimen type (POC) ARTERIAL      Performed by Geovanny Fleming            Recent Labs     07/16/19  0458   FIO2I 21   HCO3I 16.6*   PCO2I 32.0*   PHI 7.321*   PO2I 95       Telemetry:normal sinus rhythm    Imaging:  I have personally reviewed the patients radiographs and have reviewed the reports:    CXR Results  (Last 48 hours)               07/15/19 1601  XR CHEST SNGL V Final result    Impression:  IMPRESSION:       No acute findings. Narrative:  EXAM:  Chest Portable. INDICATION:  Vomiting for 4 days. Diarrhea for 3 days. Lightheadedness. COMPARISON:  10/31/07. TECHNIQUE:  Portable AP chest study       FINDINGS:        - Both lungs are clear.    - No pleural effusion or pneumothorax is detected. - Cardiac silhouette, mediastinum and hilar regions appear unremarkable. - No significant interval changes are appreciated. Total of 35 min critical care time spent at bedside during the course of care providing evaluation,management and care decisions and ordering appropriate treatment related to critical care problems exclusive of procedures. The reason for providing this level of medical care for this critically ill patient was due a critical illness that impaired one or more vital organ systems such that there was a high probability of imminent or life threatening deterioration in the patients condition.  This care involved high complexity decision making to assess, manipulate, and support vital system functions, to treat this degree vital organ system failure and to prevent further life threatening deterioration of the patients condition.       Brett Villegas [facility]     Pulmonary, Critical Care Medicine  [facility]

## 2019-07-16 NOTE — CONSULTS
[email or web address]  [phone]    GASTROENTEROLOGY CONSULT      Impression:   1. Elevated liver enzymes (DDx: Alcohol hepatitis, Hepatic steatosis, Medications)   - 7/16: ALT 61, AST 83, Lipase 161, Triglycerides 241   - 7/15: , , Lipase 462   - Toxicology/UDS negative   - Viral hepatitis negative (7/15/2019)  2. Alcohol use   - Regular use until 1 week ago. Committed to quit drinking   - Etoh negative 7/16/2019  3. Acute renal failure  4. Gastroenteritis - resolved  5. Macrocytic anemia   - Hgb 7.6/Hct 21.6; .4  6. Triglyceridemia          Plan:     1. Liver serologies as ordered. Abdominal US ordered. 2. Will coordinate for continuity of care as outpatient. 3. Close outpatient follow-up recommended to improve overall health outcomes. Will sign off-Thank you for this consultation and the opportunity to participate in the care of this patient. Please do not hesitate to call with any questions or concerns, or should event occur that may necessitate additional GI evaluation. Chief Complaint: elevated liver enzymes      HPI:  Elvin Castillo is a 46 y.o. female who I am being asked to see in consultation for an opinion regarding above. Patient was admitted for acute renal failure with incidental finding of abnormal liver function. She has history of alcohol use - stopped drinking vodka about 1 week ago. Reports she is homeless but has access to a local [facility] and established care with PCP. She reports diarrhea and vomiting for 4 days after eating pre-prepared meals at the local [facility]. On day 4 took 4 imodium tablets which stopped bowel movements. No recurrent episodes of diarrhea or vomiting. No prior colonoscopy. Today, without nausea, vomiting, diarrhea, fever, abdominal pain.       PMH:   Past Medical History:   Diagnosis Date    Acute renal failure (ARF) ([address] 7/15/2019    Hypertension     Hyponatremia 7/15/2019    Other ill-defined conditions(799.89)     high cholesterol    Psychiatric disorder        PSH:   History reviewed. No pertinent surgical history. Social HX:   Social History     Socioeconomic History    Marital status:      Spouse name: Not on file    Number of children: Not on file    Years of education: Not on file    Highest education level: Not on file   Occupational History    Not on file   Social Needs    Financial resource strain: Not on file    Food insecurity:     Worry: Not on file     Inability: Not on file    Transportation needs:     Medical: Not on file     Non-medical: Not on file   Tobacco Use    Smoking status: Never Smoker    Smokeless tobacco: Never Used   Substance and Sexual Activity    Alcohol use:  Yes    Drug use: No    Sexual activity: Never   Lifestyle    Physical activity:     Days per week: Not on file     Minutes per session: Not on file    Stress: Not on file   Relationships    Social connections:     Talks on phone: Not on file     Gets together: Not on file     Attends Zoroastrian service: Not on file     Active member of club or organization: Not on file     Attends meetings of clubs or organizations: Not on file     Relationship status: Not on file    Intimate partner violence:     Fear of current or ex partner: Not on file     Emotionally abused: Not on file     Physically abused: Not on file     Forced sexual activity: Not on file   Other Topics Concern    Not on file   Social History Narrative    Not on file       FHX:   Family History   Problem Relation Age of Onset    Stroke Mother     Diabetes Mother     Hypertension Father     Diabetes Father     Cancer Paternal Uncle        Allergy:   Allergies   Allergen Reactions    Sulfa (Sulfonamide Antibiotics) Rash       Patient Active Problem List   Diagnosis Code    HTN (hypertension) I10    Alcohol abuse counseling and surveillance Z71.41    Alcohol use disorder, severe, dependence ([facility] F10.20    Dehydration E86.0    Acute renal failure (ARF) ([facility] N17.9    Hyponatremia E87.1    History of alcohol use Z87.898       Home Medications:     Medications Prior to Admission   Medication Sig    traZODone (DESYREL) 100 mg tablet Take 1 Tab by mouth nightly.  hydroCHLOROthiazide (HYDRODIURIL) 25 mg tablet Take 1 Tab by mouth daily. Indications: Edema, hypertension    omega-3 acid ethyl esters (LOVAZA) 1 gram capsule Take 2 Caps by mouth two (2) times a day.  multivitamin (ONE A DAY) tablet Take 1 Tab by mouth daily. Review of Systems:     Constitutional: No fevers, chills    Skin: No rashes, pruritis    HENT: No rhinorrhea, sore throat. Eyes: No eye pain, photophobia, jaundice. Cardiovascular: No chest pain, heart palpitations. Respiratory: No shortness of breath, wheezing, chest discomfort    Gastrointestinal: No abdominal pain, nausea, vomiting   Genitourinary: No dysuria    Musculoskeletal: No weakness, arthralgias, wasting. Endo: No sweats. Heme: No bruising, easy bleeding. Allergies: As noted. Neurological: Alert and oriented. Gait not assessed. Psychiatric:  No anxiety, depression, hallucinations. Visit Vitals  /62   Pulse 79   Temp 98.1 °F (36.7 °C)   Resp 14   Ht 5' 1\" (1.549 m)   Wt 78.3 kg (172 lb 9.9 oz)   SpO2 100%   Breastfeeding?  No   BMI 32.62 kg/m²       Physical Assessment:     GENERAL ASSESSMENT: well developed and well nourished and no acute distress  SKIN: normal color, no lesions  HEAD: normocephalic  EYES: normal eyes  EARS: normal  NOSE: normal external appearance and nares patent  MOUTH: normal mouth and throat  NECK: normal  CHEST: normal air exchange, no rales, no rhonchi, no wheezes  HEART: regular rate and rhythm, normal S1/S2, no murmurs  ABDOMEN: soft, non-distended, no masses  EXTREMITY: normal and symmetric movement  NEURO: gross motor exam normal by observation        Basic Metabolic Profile   Recent Labs     07/16/19  0405 07/15/19  2214   * 134*   K 3.9 4.0    103   CO2 19* 20*   BUN 50* 49*   GLU 89 87   CA 7.0* 5.9*   MG 1.5*  --    PHOS  --  3.8         CBC w/Diff    Recent Labs     07/16/19 0405   WBC 6.0   RBC 2.11*   HGB 7.6*   HCT 21.6*   .4*   MCH 36.0*   MCHC 35.2   RDW 13.7   *    Recent Labs     07/16/19 0405   GRANS 75*   LYMPH 18*   EOS 1        Hepatic Function   Recent Labs     07/16/19 0405   ALB 2.5*   TP 5.1*   TBILI 0.7   SGOT 83*   AP 59   AML 33   LPSE 161        Coags   Recent Labs     07/16/19 0405   PTP 14.1   INR 1.1   APTT 29.8           Xr Chest Sngl V    Result Date: 7/15/2019  IMPRESSION: No acute findings. Saintclair [place], PA. [facility] [address]  Cell: [phone]  [email or web address]

## 2019-07-16 NOTE — PROGRESS NOTES
attended the interdisciplinary rounds for Beatrice Amin, who is a 46 y.o.,female. Patients Primary Language is: [place]. According to the patients EMR Gnosticism Affiliation is: Musa Horne. The reason the Patient came to the hospital is:   Patient Active Problem List    Diagnosis Date Noted    Dehydration 07/15/2019    [facility] failure ([facility]) ([facility] 07/15/2019    Hyponatremia 07/15/2019    History of alcohol use 07/15/2019    Alcohol use disorder, severe, dependence ([facility] 05/07/2015    Alcohol abuse counseling and surveillance 11/04/2013    HTN (hypertension) 11/19/2012      Plan:  Chaplains will continue to follow and will provide pastoral care on an as needed/requested basis.  recommends bedside caregivers page  on duty if patient shows signs of acute spiritual or emotional distress.     [address] [facility]  Board Certified [facility]   [phone]

## 2019-07-16 NOTE — ROUTINE PROCESS
Bedside and Verbal shift change  Received from Daylin Guido RN (outgoing nurse), to BEVERLY Nesbitt (oncoming)  Pt. Is AOX 4. IV patent and infusing well, Pt. denies  pain at this time. Report included the following information SBAR, Kardex, Procedure Summary, Intake/Output, MAR, Recent Lab Results, and  Cardiac Rhythm @ NSr. Will resume care and monitor Pt. Condition. Pt. Educated on call bell when in need of help and assistance. Pt.  verbalized understanding. Pt. Head to toe Assessment Done and documented. 0025  MD notified of pt. calium and bp 92/59  MD ordered to give calium gluconate 2 ams, add phosphorus and vit d 25 and changed IV to 1/2 NS @80. 
 
9921  Notified Dr. Josie Yin of Pt condition and BP continues to be low. MD to write orders. 0405  All labs send and urine for test. 
 
0445  ICU NP Miss Rebecca Baez in Pt. Room to assess Pt. 
 
0510  Called ICU for Report. RN is occupied at this time. WIll call back. .TRANSFER - OUT REPORT: 
 
Verbal report given to Vi Felix RN (ICU RN) on [facility]  being transferred to ICU(unit) for change in patient condition(Hypotensive) Report consisted of patients Situation, Background, Assessment and  
Recommendations(SBAR). Information from the following report(s) SBAR, MAR, Recent Results and Cardiac Rhythm NSR was reviewed with the receiving nurse. Lines:  
Peripheral IV 07/15/19 Left Antecubital (Active) Site Assessment Clean, dry, & intact 7/15/2019  3:42 PM  
Phlebitis Assessment 0 7/15/2019  3:42 PM  
Infiltration Assessment 0 7/15/2019  3:42 PM  
Dressing Status Clean, dry, & intact 7/15/2019  3:42 PM  
Dressing Type Tape;Transparent 7/15/2019  3:42 PM  
Hub Color/Line Status Pink;Flushed;Patent 7/15/2019  3:42 PM  
Action Taken Blood drawn 7/15/2019  3:42 PM  
Alcohol Cap Used No 7/15/2019  3:42 PM  
   
Peripheral IV 07/15/19 Right Hand (Active) Site Assessment Clean, dry, & intact 7/15/2019  4:20 PM  
 Phlebitis Assessment 0 7/15/2019  4:20 PM  
Infiltration Assessment 0 7/15/2019  4:20 PM  
Dressing Status Clean, dry, & intact 7/15/2019  4:20 PM  
  
 
Opportunity for questions and clarification was provided. Patient transported with: 
 Monitor Registered Nurse

## 2019-07-16 NOTE — PROGRESS NOTES
RENAL DAILY PROGRESS NOTE    Subjective:     Admitted for hypotension/diarrhea, seen for SUYAPA    Complaint: Feels better no CP or SOB, increasing urine output, No diarrhea or vomiting. Additional Hx: pt was on Lisinopril 20/12.5 mg OD and was drinking beer a few days prior to admission.  Does drink Vodka stop a month ago because \" it has been too hot\"    Current Facility-Administered Medications   Medication Dose Route Frequency    0.45% sodium chloride infusion  80 mL/hr IntraVENous CONTINUOUS    calcium gluconate 1 g in 0.9% sodium chloride 100 mL IVPB  1 g IntraVENous ONCE    heparin (porcine) injection 5,000 Units  5,000 Units SubCUTAneous Q8H    magnesium sulfate 2 g/50 ml IVPB (premix or compounded)  2 g IntraVENous ONCE    sodium chloride (NS) flush 5-10 mL  5-10 mL IntraVENous PRN    piperacillin-tazobactam (ZOSYN) 2.25 g in 0.9% sodium chloride (MBP/ADV) 50 mL MBP  2.25 g IntraVENous Q8H    VANCOMYCIN INFORMATION NOTE   Other CONTINUOUS    amino acids/multivit with iron (CHAPIS-RECOVER) capsule 2 Cap  2 Cap Oral TID WITH MEALS    magnesium sulfate injection 1 g  1 g IntraMUSCular ONCE PRN    trimethobenzamide (TIGAN) injection 200 mg  200 mg IntraMUSCular Q4H PRN    sodium chloride (NS) flush 5-40 mL  5-40 mL IntraVENous Q8H    sodium chloride (NS) flush 5-40 mL  5-40 mL IntraVENous PRN    [START ON 7/18/2019] thiamine (B-1) 250 mg in 0.9% sodium chloride 50 mL IVPB  250 mg IntraVENous DAILY    sodium chloride (NS) flush 5-40 mL  5-40 mL IntraVENous Q8H    sodium chloride (NS) flush 5-40 mL  5-40 mL IntraVENous PRN    HYDROmorphone (DILAUDID) injection 1 mg  1 mg IntraVENous Q3H PRN    naloxone (NARCAN) injection 0.4 mg  0.4 mg IntraVENous EVERY 2 MINUTES AS NEEDED    thiamine (B-1) 500 mg in 0.9% sodium chloride 50 mL IVPB  500 mg IntraVENous Q8H           Objective:     Patient Vitals for the past 24 hrs:   Temp Pulse Resp BP SpO2   07/16/19 1000  81 16 113/67    07/16/19 0900  73 16 (!) 68/54    07/16/19 0800 97.9 °F (36.6 °C) 80 13 (!) 76/50    07/16/19 0700  81 15 92/55    07/16/19 0518  77 13  100 %   07/16/19 0516  74 14  100 %   07/16/19 0515 98 °F (36.7 °C) 76 16 125/85 100 %   07/16/19 0514  76 16  100 %   07/16/19 0513  75 15  100 %   07/16/19 0512  75 15  100 %   07/16/19 0511  75 15  100 %   07/16/19 0510  77 16  100 %   07/16/19 0509  75 16  100 %   07/16/19 0508  78 16  100 %   07/16/19 0507  78 17  100 %   07/16/19 0506  85 18  100 %   07/16/19 0505  87 24  100 %   07/16/19 0504  80 16  100 %   07/16/19 0503  82 26  100 %   07/16/19 0502  82 15  98 %   07/16/19 0501  78 20  100 %   07/16/19 0500  83 19 107/60 100 %   07/16/19 0459  83 21  100 %   07/16/19 0458  82 17  100 %   07/16/19 0457  84 23  98 %   07/16/19 0456  84 22  100 %   07/16/19 0455  83 20     07/16/19 0454  79 15     07/16/19 0453  79 18     07/16/19 0452  82 18  100 %   07/16/19 0451  80 19  100 %   07/16/19 0450  84 18  99 %   07/16/19 0449  83 21     07/16/19 0448  78 18     07/16/19 0447  76 17     07/16/19 0446  80 17     07/16/19 0445  83 20 104/46    07/16/19 0444  83 15  100 %   07/16/19 0443  85 18  100 %   07/16/19 0442  79 16  99 %   07/16/19 0441  82 17     07/16/19 0440  80 15     07/16/19 0439  86 25 114/56 100 %   07/16/19 0438  83 22  100 %   07/16/19 0437  84 20  100 %   07/16/19 0436  77 16  100 %   07/16/19 0435  83 15  100 %   07/16/19 0434  87 16  100 %   07/16/19 0433  86 18  99 %   07/16/19 0432  88 20  100 %   07/16/19 0431  87 18  100 %   07/16/19 0430  87 15 100/69 96 %   07/16/19 0429  85 13  99 %   07/16/19 0428  84 18  100 %   07/16/19 0427  87 19  100 %   07/16/19 0426  (!) 107 23     07/16/19 0425  92 26     07/16/19 0424  88 22     07/16/19 0423  88 17     07/16/19 0422  86 15     07/16/19 0421  85 17  100 %   07/16/19 0420  89 16  99 %   07/16/19 0419  88 18     07/16/19 0418  90 18  100 %   07/16/19 0417  92 20     07/16/19 0416  87 18  100 %   07/16/19 0415  92 17 90/58 100 %   07/16/19 0414  90 18  100 %   07/16/19 0413  88 18  100 %   07/16/19 0412  89 15  100 %   07/16/19 0411  87 19  100 %   07/16/19 0410  91 18  99 %   07/16/19 0409  91 20  100 %   07/16/19 0408  90 17  100 %   07/16/19 0407  92 22  100 %   07/16/19 0406  88 14  100 %   07/16/19 0405  92 13  100 %   07/16/19 0404  89 15  100 %   07/16/19 0403  81 14  99 %   07/16/19 0402  83 13  98 %   07/16/19 0401  86 13  99 %   07/16/19 0400  86 13 90/50 98 %   07/16/19 0359  87 13  99 %   07/16/19 0358  88 14  98 %   07/16/19 0357  86 14  98 %   07/16/19 0356  86 13  98 %   07/16/19 0355  87 13  98 %   07/16/19 0354  84 13  99 %   07/16/19 0353  83 14  99 %   07/16/19 0352  87 13  98 %   07/16/19 0351  87 13  97 %   07/16/19 0350  85 13  98 %   07/16/19 0349  81 13  99 %   07/16/19 0348  85 13  98 %   07/16/19 0347  82 13  98 %   07/16/19 0346  85 13  99 %   07/16/19 0345  84 13 91/49 99 %   07/16/19 0344  84 13  98 %   07/16/19 0343  84 12  98 %   07/16/19 0342  85 14  96 %   07/16/19 0341  81 12  100 %   07/16/19 0340  83 13  97 %   07/16/19 0339  86 17  98 %   07/16/19 0338  88 13  98 %   07/16/19 0337  87 14  98 %   07/16/19 0336  86 14  98 %   07/16/19 0335  82 13  98 %   07/16/19 0334  88 14  93 %   07/16/19 0333  87 15  95 %   07/16/19 0332  84 14  96 %   07/16/19 0331  83 12  99 %   07/16/19 0330  81 14 (!) 89/50 93 %   07/16/19 0329  88 14  92 %   07/16/19 0328  85 13  98 %   07/16/19 0327  83 15  91 %   07/16/19 0326  79 13  99 %   07/16/19 0325  88 17  (!) 89 %   07/16/19 0324  81 12  94 %   07/16/19 0323  83 15  93 %   07/16/19 0322  83 17  (!) 82 %   07/16/19 0321  80 13  97 %   07/16/19 0320  84 18  (!) 89 %   07/16/19 0319  80 16  90 %   07/16/19 0318  81 13  97 % 07/16/19 0317  80 12  98 %   07/16/19 0316  78 13  90 %   07/16/19 0315  79 18 (!) 89/43 97 %   07/16/19 0314  83 15  98 %   07/16/19 0313  87 16 (!) 89/49 (!) 84 %   07/16/19 0312  80 15  98 %   07/16/19 0311  86 18  (!) 85 %   07/16/19 0310  80 15  96 %   07/16/19 0309  85 17  90 %   07/16/19 0308  86 16  97 %   07/16/19 0307  79 15  96 %   07/16/19 0306  78 12  98 %   07/16/19 0305  80 13  100 %   07/16/19 0304  79 15  96 %   07/16/19 0303  82 18 93/52 96 %   07/16/19 0302 97.6 °F (36.4 °C) 97 18 106/80 97 %   07/16/19 0300  82 15 93/49 99 %   07/16/19 0245  87 17 106/80 98 %   07/16/19 0230  91 14 97/46 98 %   07/16/19 0215  88 14 91/54 98 %   07/16/19 0200  84 16 91/56 98 %   07/16/19 0145  84 20 93/61 98 %   07/16/19 0130  91 17 98/59 100 %   07/16/19 0115  91 22 92/64    07/16/19 0100  85 20 (!) 85/58 93 %   07/16/19 0052 97.4 °F (36.3 °C) 90 18 (!) 82/40 100 %   07/16/19 0045  80 14 (!) 83/49 99 %   07/16/19 0030  91 22 (!) 82/40 99 %   07/16/19 0015  90 20 92/59 98 %   07/15/19 2345  88 20  100 %   07/15/19 2330  83 20  100 %   07/15/19 2315  90 17  100 %   07/15/19 2300  95 18  100 %   07/15/19 2246    116/56    07/15/19 2245  95 19  98 %   07/15/19 2230  (!) 103 21  99 %   07/15/19 2215  95 16 (!) 73/52 95 %   07/15/19 2200  87 15 (!) 84/47 96 %   07/15/19 2145  89 16 (!) 87/52 98 %   07/15/19 2130  88 17 95/48 100 %   07/15/19 2115  87 19 (!) 85/63 99 %   07/15/19 2106    97/43    07/15/19 2100  90 17 97/43 99 %   07/15/19 2052    (!) 88/49    07/15/19 2045  (!) 102 20 (!) 80/63    07/15/19 2035 98 °F (36.7 °C) 100 20 (!) 117/94 98 %   07/15/19 2030  99 20 (!) 117/94 99 %   07/15/19 2027  98 27 (!) 86/47 99 %   07/15/19 2025  98 22 (!) 84/58 99 %   07/15/19 2000  (!) 105 20 96/41 100 %   07/15/19 1930  (!) 102 19 121/64 100 %   07/15/19 1900  (!) 106 24 (!) 88/63 98 %   07/15/19 1845  (!) 104 13 (!) 78/53 98 %   07/15/19 1830  (!) 114 (!) 32 108/62 97 %   07/15/19 1815  99 17 98/61 100 %   07/15/19 1800  (!) 107 20 110/60 100 %   07/15/19 1745  (!) 103 20 109/63 100 %   07/15/19 1730  (!) 102 23 108/59 100 %   07/15/19 1715  98 17 102/52 100 %   07/15/19 1700  93 19 (!) 85/51 100 %   07/15/19 1645  86 16 91/47 99 %   07/15/19 1630  94 20 (!) 86/34 100 %   07/15/19 1615  84 14 (!) 71/37 99 %   07/15/19 1602  89 25  100 %   07/15/19 1601  88 20     07/15/19 1600    (!) 84/39    07/15/19 1559    96/47    07/15/19 1455 96.8 °F (36 °C) (!) 102 22 (!) 73/53 100 %        Weight change:      07/14 1901 - 07/16 0700  In: 5304.5 [I.V.:5304.5]  Out: 2050 [Urine:2050]    Intake/Output Summary (Last 24 hours) at 7/16/2019 1031  Last data filed at 7/16/2019 0600  Gross per 24 hour   Intake 5304.5 ml   Output 2050 ml   Net 3254.5 ml     Physical Exam: alert, oriented x 3 afebrile    HEENT: non icteric, pinkish conj  Neck: No JVD  Cardiovascular: regular, no rub  C/L: clear ant/lat  Abdomen: soft + BS  Ext: No LE edema    Data Review:     LABS:   Hematology:   Recent Labs     07/16/19  0405 07/16/19  0130 07/15/19  1526   WBC 6.0  --  9.8   HGB 7.6* 7.8* 10.3*   HCT 21.6* 22.1* 29.1*   Pl 105K  Chemistry:   Recent Labs     07/16/19  0926 07/16/19  0405 07/15/19  2214 07/15/19  1526   BUN 46* 50* 49* 61*   CREA 4.20* 4.46* 4.64* 7.05*   CA 8.4* 7.0* 5.9* 8.5   ALB  --  2.5*  --  4.1   K 3.7 3.9 4.0 3.4*   * 134* 134* 122*    104 103 82*   CO2 21 19* 20* 26   PHOS 3.3  --  3.8 6.9*   GLU 79 89 87 112*      Mag 1.6  Lipase 161  Iron 219 sat 94%  CULTURE: Blood c/s neg so far, Stool pend    IMPRESSION AND PLAN:   SUYAPA, non oliguric ATN in the setting of GI losses, ACEI/HCTZ, improving slowly. Cont to trend for further improvement. Avoid nephrotoxic drugs and adjust all meds to renal function.   Hyponatremia, slowly improving, cont with 1/2NS saline  Hypotension resolved  Anemia trend Hgb      Discussed with  Tobin Braun MD  7/16/2019

## 2019-07-16 NOTE — ROUTINE PROCESS
Bedside and Verbal shift change report given to [facility] (oncoming nurse) by Grabiel Koch RN 
 (offgoing nurse). Report given with SBAR, Kardex, Intake/Output, MAR, Accordion and Recent Results.

## 2019-07-16 NOTE — H&P
History & Physical    Patient: Tobin Bryson MRN: [id number]  [facility]: [id number]    YOB: 1965  Age: 46 y.o. Sex: female      DOA: 7/15/2019       HPI:     Tobin Bryson is a 46 y.o. female who states she has been homeless, but accesses a local community center that provides showers and support to obtain work. She usually gets around town with her bike. This has been difficult the past few days due to frequent watery diarrhea. She reports chills and vomiting with diarrhea, sometimes incontinent, for 4 days. This morning she decided to go to [employer] [facility] and bought an 8 pack of imodium and took half. As she drove her bike back to where she rests, she became lightheaded and dizzy and called a friend to get a ride to [facility]. In the ED she was hypotensive 73/53, tachycardic 102  and lactic acid 2.29 and received sepsis bundle with 2274 lactated ringers and a liter of saline when hypotensive a few hours later with  Vancomycin, levaquin and Zosyn 4.5 g. She was found to be hyponatremic 122 in acute renal failure creatinine 7.05, BUN 61, GFR 6, with anemia Hgb 10.3, Hct 29.1. Nephrology was consulted in the ED and adjusted fluids for slow Na+ correction: to 1/2 NSS at 80 cc/hr. Renal US in am.         Past Medical History:   Diagnosis Date    Acute renal failure (ARF) ([facility] [address] 7/15/2019    Hypertension     Hyponatremia 7/15/2019    Other ill-defined conditions(799.89)     high cholesterol    Psychiatric disorder        History reviewed. No pertinent surgical history.     Family History   Problem Relation Age of Onset    Stroke Mother     Diabetes Mother     Hypertension Father     Diabetes Father     Cancer Paternal Uncle        Social History     Socioeconomic History    Marital status:      Spouse name: Not on file    Number of children: Not on file    Years of education: Not on file    Highest education level: Not on file   Tobacco Use    Smoking status: Never Smoker  Smokeless tobacco: Never Used   Substance and Sexual Activity    Alcohol use: Yes    Drug use: No    Sexual activity: Never       Prior to Admission medications    Medication Sig Start Date End Date Taking? Authorizing Provider   traZODone (DESYREL) 100 mg tablet Take 1 Tab by mouth nightly. 2/20/17   Zulema Noriega NP   hydroCHLOROthiazide (HYDRODIURIL) 25 mg tablet Take 1 Tab by mouth daily. Indications: Edema, hypertension 2/20/17   Toni BARDALES NP   omega-3 acid ethyl esters (LOVAZA) 1 gram capsule Take 2 Caps by mouth two (2) times a day. 11/10/16   Toni BARDALES NP   multivitamin (ONE A DAY) tablet Take 1 Tab by mouth daily. 11/10/16   Toni BARDALES NP       Allergies   Allergen Reactions    Sulfa (Sulfonamide Antibiotics) Rash              Physical Exam:      Visit Vitals  BP 97/43   Pulse 100   Temp 98 °F (36.7 °C)   Resp 20   Ht 5' 1\" (1.549 m)   Wt 75.8 kg (167 lb)   SpO2 98%   BMI 31.55 kg/m²       Physical Exam:  GENERAL: fatigued, cooperative, severe distress, toxic  HEENT  Speech clear and goal directed, frequently tearful, labile mood, conjunctiva clear, mildly icteric, mucous membranes dry, neck supple  Chest HR tachy 100 reg, lungs clear  Abdomen soft, BS+, non tender  Extremities moving all 4 extremities, shin very dry, poor turgor, + pulses,  Feet warm, cap refill < 3 sec      Lab/Data Review:  Labs: Results:       Chemistry Recent Labs     07/15/19  1526   *   *   K 3.4*   CL 82*   CO2 26   BUN 61*   CREA 7.05*   CA 8.5   AGAP 14   BUCR 9*      TP 8.3*   ALB 4.1   GLOB 4.2*   AGRAT 1.0      CBC w/Diff Recent Labs     07/15/19  1526   WBC 9.8   RBC 2.88*   HGB 10.3*   HCT 29.1*      GRANS 81*   LYMPH 13*   EOS 0      Coagulation No results for input(s): PTP, INR, APTT in the last 72 hours. No lab exists for component: INREXT    Iron/Ferritin No results for input(s): IRON in the last 72 hours.     No lab exists for component: TIBCCALC BNP No results for input(s): BNPP in the last 72 hours. Cardiac Enzymes Recent Labs     07/15/19  1526         Liver Enzymes Recent Labs     07/15/19  1526   TP 8.3*   ALB 4.1      SGOT 150*      Thyroid Studies Lab Results   Component Value Date/Time    TSH 2.39 11/07/2013 11:45 AM          All Micro Results     Procedure Component Value Units Date/Time    CULTURE, URINE [[id number]] Collected:  07/15/19 1526    Order Status:  Completed Specimen:  Urine from Clean catch Updated:  07/15/19 1615    CULTURE, BLOOD [[id number]] Collected:  07/15/19 1608    Order Status:  Completed Specimen:  Blood Updated:  07/15/19 1613    CULTURE, BLOOD [[id number]] Collected:  07/15/19 1524    Order Status:  Completed Specimen:  Blood Updated:  07/15/19 1542    C. DIFFICILE AG & TOXIN A/B [[id number]]     Order Status:  Sent Specimen:  Stool           Imaging Reviewed:   chest xray 7/15/2019     INDICATION:  Vomiting for 4 days. Diarrhea for 3 days. Lightheadedness.      COMPARISON:  10/31/07.     TECHNIQUE:  Portable AP chest study     FINDINGS:      - Both lungs are clear.   - No pleural effusion or pneumothorax is detected. - Cardiac silhouette, mediastinum and hilar regions appear unremarkable.    - No significant interval changes are appreciated.     IMPRESSION  IMPRESSION:     No acute findings.       Assessment:   Principal Problem:    Acute renal failure (ARF) ([address] (7/15/2019)    Active Problems:    Dehydration (7/15/2019)      Hyponatremia (7/15/2019)        Elevated LFT's, lipase    Plan:     Patient with presentation of severe dehydration and hyponatremia after   Several days diarrhea and vomiting  Nephrology consulted in ED  Parenteral fluids adjusted   CT scan deferred due to renal status and large dose of imodium today PTA  Patient has taken clear liquids  NPO  US abdomen am  Follow LFT's, BMP    Full code    Lucinda Bennett DO  7/15/2019, 9:52 PM

## 2019-07-16 NOTE — ED NOTES
Pt continues to be hypotensive. Dr. Myah Erickson aware. 1L normal saline administered via pressure bag and hospitalist paged.

## 2019-07-16 NOTE — PROGRESS NOTES
Patient no complain of pain , no N/V, diarrhea, NPO for now, for U/S abd, sips with meds given, continue on IVF 1/2 NS seen by nephrologist Na remains at 134, continue to monitor. moves self on bed, awaiting for stool specimen be available, on enteric isolation for now till specimen confirmed.

## 2019-07-16 NOTE — CONSULTS
[facility]  [facility]     Patient: Ann Ribeiro Age: 46 y.o. : 1965 MR#: [id number] SSN: [id number]  Date: 2019     Subjective:     Pt is seen and examined. Admitted last night to ICU for severe dehydration, ARF, elevated LFTs and Hypotension. Hx of heavy alcohol use   S/p hydration and cleared by ICU and GI, nephrology is following and will be transferred to [facility] for further monitoring   Pt is seen and examined. Feels much better, denies abdominal pain and fever. States that she did not notice black pearl stools or BRBPR  Has elevated iron but anemic with a drop in H/H since admission possibly 2 ry to dehydration. In general feels much better with minimal alcohol     Assessment/Plan:   1. Severe dehydration >. Tolerating PO and on IVF   2- UTI >> on zosyn   3- ARF>> Continue Hydration   4- elevated Iron level >> will follow with GI as an OP >> wants to r/o Hemochromatosis   5-Hypotension >> hold BP meds and monitor   6- Anemia >> will send for fecal occult but doubt GI bleed at this time   7- heavy alcohol use >> ativan PRN     Additional Notes:     Case discussed with:  [x]Patient  []Family  []Nursing  []Case Management  DVT Prophylaxis:  []Lovenox  []Hep SQ  []SCDs  []Coumadin   []On Heparin gtt    Objective:   VS:   Visit Vitals  /61   Pulse 82   Temp 98.1 °F (36.7 °C)   Resp 21   Ht 5' 1\" (1.549 m)   Wt 78.3 kg (172 lb 9.9 oz)   SpO2 100%   Breastfeeding?  No   BMI 32.62 kg/m²      Tmax/24hrs: Temp (24hrs), Av.8 °F (36.6 °C), Min:97.4 °F (36.3 °C), Max:98.1 °F (36.7 °C)      Intake/Output Summary (Last 24 hours) at 2019 1514  Last data filed at 2019 1100  Gross per 24 hour   Intake 5304.5 ml   Output 2650 ml   Net 2654.5 ml       GENERAL: AAO x 3, NAD  HEENT: EOMI, PAULINO, no discharge  NECK: No lymphadenopthy or thyroid swelling, JVD not seen  LYMPH: No supraclavicular or cervical or axillary nodes on both sides  CVS: S1S2, No MRG  RS: CTA B/L, -ve Rales, -ve Wheezing  Abd: Soft, non tender, non distended, No guarding, No rigidity  NEURO:  No focal neurologic deficits, CN 2-12 intact  Extrm: -ve LE edema   Skin: No rash  Psych: no signs of depression       Labs:    Recent Results (from the past 24 hour(s))   CULTURE, BLOOD    Collection Time: 07/15/19  3:24 PM   Result Value Ref Range    Special Requests: NO SPECIAL REQUESTS      Culture result: NO GROWTH AFTER 16 HOURS     CBC WITH AUTOMATED DIFF    Collection Time: 07/15/19  3:26 PM   Result Value Ref Range    WBC 9.8 4.6 - 13.2 K/uL    RBC 2.88 (L) 4.20 - 5.30 M/uL    HGB 10.3 (L) 12.0 - 16.0 g/dL    HCT 29.1 (L) 35.0 - 45.0 %    .0 (H) 74.0 - 97.0 FL    MCH 35.8 (H) 24.0 - 34.0 PG    MCHC 35.4 31.0 - 37.0 g/dL    RDW 13.6 11.6 - 14.5 %    PLATELET 328 243 - 546 K/uL    MPV 10.3 9.2 - 11.8 FL    NEUTROPHILS 81 (H) 40 - 73 %    LYMPHOCYTES 13 (L) 21 - 52 %    MONOCYTES 6 3 - 10 %    EOSINOPHILS 0 0 - 5 %    BASOPHILS 0 0 - 2 %    ABS. NEUTROPHILS 8.0 1.8 - 8.0 K/UL    ABS. LYMPHOCYTES 1.3 0.9 - 3.6 K/UL    ABS. MONOCYTES 0.6 0.05 - 1.2 K/UL    ABS. EOSINOPHILS 0.0 0.0 - 0.4 K/UL    ABS.  BASOPHILS 0.0 0.0 - 0.1 K/UL    DF AUTOMATED     METABOLIC PANEL, BASIC    Collection Time: 07/15/19  3:26 PM   Result Value Ref Range    Sodium 122 (L) 136 - 145 mmol/L    Potassium 3.4 (L) 3.5 - 5.5 mmol/L    Chloride 82 (L) 100 - 108 mmol/L    CO2 26 21 - 32 mmol/L    Anion gap 14 3.0 - 18 mmol/L    Glucose 112 (H) 74 - 99 mg/dL    BUN 61 (H) 7.0 - 18 MG/DL    Creatinine 7.05 (H) 0.6 - 1.3 MG/DL    BUN/Creatinine ratio 9 (L) 12 - 20      GFR est AA 7 (L) >60 ml/min/1.73m2    GFR est non-AA 6 (L) >60 ml/min/1.73m2    Calcium 8.5 8.5 - 10.1 MG/DL   HEPATIC FUNCTION PANEL    Collection Time: 07/15/19  3:26 PM   Result Value Ref Range    Protein, total 8.3 (H) 6.4 - 8.2 g/dL    Albumin 4.1 3.4 - 5.0 g/dL    Globulin 4.2 (H) 2.0 - 4.0 g/dL    A-G Ratio 1.0 0.8 - 1.7 Bilirubin, total 1.1 (H) 0.2 - 1.0 MG/DL    Bilirubin, direct 0.3 (H) 0.0 - 0.2 MG/DL    Alk. phosphatase 100 45 - 117 U/L    AST (SGOT) 150 (H) 15 - 37 U/L    ALT (SGPT) 108 (H) 13 - 56 U/L   LIPASE    Collection Time: 07/15/19  3:26 PM   Result Value Ref Range    Lipase 462 (H) 73 - 393 U/L   URINALYSIS W/ RFLX MICROSCOPIC    Collection Time: 07/15/19  3:26 PM   Result Value Ref Range    Color YELLOW      Appearance TURBID      Specific gravity 1.018 1.005 - 1.030      pH (UA) 5.0 5.0 - 8.0      Protein 30 (A) NEG mg/dL    Glucose NEGATIVE  NEG mg/dL    Ketone TRACE (A) NEG mg/dL    Bilirubin NEGATIVE  NEG      Blood NEGATIVE  NEG      Urobilinogen 1.0 0.2 - 1.0 EU/dL    Nitrites NEGATIVE  NEG      Leukocyte Esterase SMALL (A) NEG     CULTURE, URINE    Collection Time: 07/15/19  3:26 PM   Result Value Ref Range    Special Requests: NO SPECIAL REQUESTS      Culture result:        [id number]  COLONIES/mL  MIXED GRAM POSITIVE OMAR, PROBABLE SKIN/GENITAL CONTAMINATION. URINE MICROSCOPIC ONLY    Collection Time: 07/15/19  3:26 PM   Result Value Ref Range    WBC 4 to 10 0 - 4 /hpf    RBC 0 to 3 0 - 5 /hpf    Epithelial cells 3+ 0 - 5 /lpf    Bacteria 2+ (A) NEG /hpf    Hyaline cast 4 to 10 0 - 2 /lpf   HEPATITIS PANEL, ACUTE    Collection Time: 07/15/19  3:26 PM   Result Value Ref Range    Hepatitis A, IgM NEGATIVE  NEG      __          Hepatitis B surface Ag 0.16 <1.00 Index    Hep B surface Ag Interp. NEGATIVE  NEG      __          Hepatitis B core, IgM NEGATIVE  NEG      __          Hepatitis C virus Ab 0.03 <0.80 Index    Hep C  virus Ab Interp.  NEGATIVE  NEG      Hep C  virus Ab comment         CK    Collection Time: 07/15/19  3:26 PM   Result Value Ref Range     26 - 192 U/L   MAGNESIUM    Collection Time: 07/15/19  3:26 PM   Result Value Ref Range    Magnesium 1.7 1.6 - 2.6 mg/dL   PHOSPHORUS    Collection Time: 07/15/19  3:26 PM   Result Value Ref Range    Phosphorus 6.9 (H) 2.5 - 4.9 MG/DL   ETHYL ALCOHOL Collection Time: 07/15/19  3:26 PM   Result Value Ref Range    ALCOHOL(ETHYL),SERUM <3 0 - 3 MG/DL   VITAMIN D, 25 HYDROXY    Collection Time: 07/15/19  3:26 PM   Result Value Ref Range    Vitamin D 25-Hydroxy 30.2 30 - 100 ng/mL   HEP B SURFACE AB    Collection Time: 07/15/19  3:26 PM   Result Value Ref Range    Hepatitis B surface Ab <3.10 (L) >10.0 mIU/mL    Hep B surface Ab Interp. NEGATIVE  (A) POS      Hep B surface Ab comment        Samples with a  value of 10 mIU/mL or greater are considered positive (protective immunity) in accordance with the CDC guidelines.    POC LACTIC ACID    Collection Time: 07/15/19  3:35 PM   Result Value Ref Range    Lactic Acid (POC) 2.29 (HH) 0.40 - 2.00 mmol/L   CULTURE, BLOOD    Collection Time: 07/15/19  4:08 PM   Result Value Ref Range    Special Requests: NO SPECIAL REQUESTS      Culture result: NO GROWTH AFTER 15 HOURS     POC LACTIC ACID    Collection Time: 07/15/19  5:45 PM   Result Value Ref Range    Lactic Acid (POC) 1.43 0.40 - 1.11 mmol/L   METABOLIC PANEL, BASIC    Collection Time: 07/15/19 10:14 PM   Result Value Ref Range    Sodium 134 (L) 136 - 145 mmol/L    Potassium 4.0 3.5 - 5.5 mmol/L    Chloride 103 100 - 108 mmol/L    CO2 20 (L) 21 - 32 mmol/L    Anion gap 11 3.0 - 18 mmol/L    Glucose 87 74 - 99 mg/dL    BUN 49 (H) 7.0 - 18 MG/DL    Creatinine 4.64 (H) 0.6 - 1.3 MG/DL    BUN/Creatinine ratio 11 (L) 12 - 20      GFR est AA 12 (L) >60 ml/min/1.73m2    GFR est non-AA 10 (L) >60 ml/min/1.73m2    Calcium 5.9 (LL) 8.5 - 10.1 MG/DL   PHOSPHORUS    Collection Time: 07/15/19 10:14 PM   Result Value Ref Range    Phosphorus 3.8 2.5 - 4.9 MG/DL   HGB & HCT    Collection Time: 07/16/19  1:30 AM   Result Value Ref Range    HGB 7.8 (L) 12.0 - 16.0 g/dL    HCT 22.1 (L) 35.0 - 75.8 %   METABOLIC PANEL, BASIC    Collection Time: 07/16/19  4:05 AM   Result Value Ref Range    Sodium 134 (L) 136 - 145 mmol/L    Potassium 3.9 3.5 - 5.5 mmol/L    Chloride 104 100 - 108 mmol/L CO2 19 (L) 21 - 32 mmol/L    Anion gap 11 3.0 - 18 mmol/L    Glucose 89 74 - 99 mg/dL    BUN 50 (H) 7.0 - 18 MG/DL    Creatinine 4.46 (H) 0.6 - 1.3 MG/DL    BUN/Creatinine ratio 11 (L) 12 - 20      GFR est AA 12 (L) >60 ml/min/1.73m2    GFR est non-AA 10 (L) >60 ml/min/1.73m2    Calcium 7.0 (L) 8.5 - 10.1 MG/DL   HEPATIC FUNCTION PANEL    Collection Time: 07/16/19  4:05 AM   Result Value Ref Range    Protein, total 5.1 (L) 6.4 - 8.2 g/dL    Albumin 2.5 (L) 3.4 - 5.0 g/dL    Globulin 2.6 2.0 - 4.0 g/dL    A-G Ratio 1.0 0.8 - 1.7      Bilirubin, total 0.7 0.2 - 1.0 MG/DL    Bilirubin, direct 0.3 (H) 0.0 - 0.2 MG/DL    Alk. phosphatase 59 45 - 117 U/L    AST (SGOT) 83 (H) 15 - 37 U/L    ALT (SGPT) 61 (H) 13 - 56 U/L   LIPASE    Collection Time: 07/16/19  4:05 AM   Result Value Ref Range    Lipase 161 73 - 393 U/L   CBC WITH AUTOMATED DIFF    Collection Time: 07/16/19  4:05 AM   Result Value Ref Range    WBC 6.0 4.6 - 13.2 K/uL    RBC 2.11 (L) 4.20 - 5.30 M/uL    HGB 7.6 (L) 12.0 - 16.0 g/dL    HCT 21.6 (L) 35.0 - 45.0 %    .4 (H) 74.0 - 97.0 FL    MCH 36.0 (H) 24.0 - 34.0 PG    MCHC 35.2 31.0 - 37.0 g/dL    RDW 13.7 11.6 - 14.5 %    PLATELET 005 (L) 306 - 420 K/uL    MPV 9.5 9.2 - 11.8 FL    NEUTROPHILS 75 (H) 40 - 73 %    LYMPHOCYTES 18 (L) 21 - 52 %    MONOCYTES 6 3 - 10 %    EOSINOPHILS 1 0 - 5 %    BASOPHILS 0 0 - 2 %    ABS. NEUTROPHILS 4.5 1.8 - 8.0 K/UL    ABS. LYMPHOCYTES 1.1 0.9 - 3.6 K/UL    ABS. MONOCYTES 0.3 0.05 - 1.2 K/UL    ABS. EOSINOPHILS 0.1 0.0 - 0.4 K/UL    ABS.  BASOPHILS 0.0 0.0 - 0.1 K/UL    DF AUTOMATED     MAGNESIUM    Collection Time: 07/16/19  4:05 AM   Result Value Ref Range    Magnesium 1.5 (L) 1.6 - 2.6 mg/dL   SALICYLATE    Collection Time: 07/16/19  4:05 AM   Result Value Ref Range    Salicylate level <1.0 (L) 2.8 - 20.0 MG/DL   DRUG SCREEN, URINE    Collection Time: 07/16/19  4:05 AM   Result Value Ref Range    BENZODIAZEPINES NEGATIVE  NEG      BARBITURATES NEGATIVE  NEG THC (TH-CANNABINOL) NEGATIVE  NEG      OPIATES NEGATIVE  NEG      PCP(PHENCYCLIDINE) NEGATIVE  NEG      COCAINE NEGATIVE  NEG      AMPHETAMINES NEGATIVE  NEG      METHADONE NEGATIVE  NEG      HDSCOM (NOTE)    PTT    Collection Time: 07/16/19  4:05 AM   Result Value Ref Range    aPTT 29.8 23.0 - 36.4 SEC   PROTHROMBIN TIME + INR    Collection Time: 07/16/19  4:05 AM   Result Value Ref Range    Prothrombin time 14.1 11.5 - 15.2 sec    INR 1.1 0.8 - 1.2     CALCIUM, IONIZED    Collection Time: 07/16/19  4:05 AM   Result Value Ref Range    Ionized Calcium 1.07 (L) 1.12 - 1.32 MMOL/L   ACETAMINOPHEN    Collection Time: 07/16/19  4:05 AM   Result Value Ref Range    Acetaminophen level <2 (L) 10.0 - 30.0 ug/mL   AMYLASE    Collection Time: 07/16/19  4:05 AM   Result Value Ref Range    Amylase 33 25 - 115 U/L   TRIGLYCERIDE    Collection Time: 07/16/19  4:05 AM   Result Value Ref Range    Triglyceride 241 (H) <150 MG/DL   POC G3    Collection Time: 07/16/19  4:58 AM   Result Value Ref Range    Device: ROOM AIR      FIO2 (POC) 21 %    pH (POC) 7.321 (L) 7.35 - 7.45      pCO2 (POC) 32.0 (L) 35.0 - 45.0 MMHG    pO2 (POC) 95 80 - 100 MMHG    HCO3 (POC) 16.6 (L) 22 - 26 MMOL/L    sO2 (POC) 97 92 - 97 %    Base deficit (POC) 9 mmol/L    Allens test (POC) YES      Total resp. rate 23      Site RIGHT RADIAL      Patient temp.  98.6      Specimen type (POC) ARTERIAL      Performed by Noe Cornejo    MRSA SCREEN - PCR (NASAL)    Collection Time: 07/16/19  8:50 AM   Result Value Ref Range    Special Requests: NO SPECIAL REQUESTS      Culture result:        MRSA target DNA is not detected (presumptive not colonized with MRSA)   VANCOMYCIN, RANDOM    Collection Time: 07/16/19  9:26 AM   Result Value Ref Range    Vancomycin, random 18.4 5.0 - 36.0 UG/ML   METABOLIC PANEL, BASIC    Collection Time: 07/16/19  9:26 AM   Result Value Ref Range    Sodium 134 (L) 136 - 145 mmol/L    Potassium 3.7 3.5 - 5.5 mmol/L    Chloride 103 100 - 108 mmol/L    CO2 21 21 - 32 mmol/L    Anion gap 10 3.0 - 18 mmol/L    Glucose 79 74 - 99 mg/dL    BUN 46 (H) 7.0 - 18 MG/DL    Creatinine 4.20 (H) 0.6 - 1.3 MG/DL    BUN/Creatinine ratio 11 (L) 12 - 20      GFR est AA 13 (L) >60 ml/min/1.73m2    GFR est non-AA 11 (L) >60 ml/min/1.73m2    Calcium 8.4 (L) 8.5 - 10.1 MG/DL   IRON PROFILE    Collection Time: 07/16/19  9:26 AM   Result Value Ref Range    Iron 219 (H) 50 - 175 ug/dL    TIBC 232 (L) 250 - 450 ug/dL    Iron % saturation 94 %   MAGNESIUM    Collection Time: 07/16/19  9:26 AM   Result Value Ref Range    Magnesium 1.6 1.6 - 2.6 mg/dL   PHOSPHORUS    Collection Time: 07/16/19  9:26 AM   Result Value Ref Range    Phosphorus 3.3 2.5 - 4.9 MG/DL   MAGNESIUM    Collection Time: 07/16/19 11:55 AM   Result Value Ref Range    Magnesium 2.2 1.6 - 2.6 mg/dL   PHOSPHORUS    Collection Time: 07/16/19 11:55 AM   Result Value Ref Range    Phosphorus 3.5 2.5 - 4.9 MG/DL   METABOLIC PANEL, BASIC    Collection Time: 07/16/19 11:55 AM   Result Value Ref Range    Sodium 134 (L) 136 - 145 mmol/L    Potassium 3.7 3.5 - 5.5 mmol/L    Chloride 103 100 - 108 mmol/L    CO2 21 21 - 32 mmol/L    Anion gap 10 3.0 - 18 mmol/L    Glucose 77 74 - 99 mg/dL    BUN 44 (H) 7.0 - 18 MG/DL    Creatinine 3.89 (H) 0.6 - 1.3 MG/DL    BUN/Creatinine ratio 11 (L) 12 - 20      GFR est AA 15 (L) >60 ml/min/1.73m2    GFR est non-AA 12 (L) >60 ml/min/1.73m2    Calcium 8.3 (L) 8.5 - 10.1 MG/DL   GLUCOSE, POC    Collection Time: 07/16/19 12:00 PM   Result Value Ref Range    Glucose (POC) 89 70 - 110 mg/dL       Signed By: Debra Gillette MD     July 16, 2019 3:14 PM

## 2019-07-16 NOTE — ROUTINE PROCESS
Received pt from ED at 0620, A&Ox4. Vital signs stable. Telemetry NSR. All belongings are with pt. Pt denies pain at this time. Voiding via purewick Ext Cath. Contact isolation for r/o C-Diff. In bed resting and watching TV.

## 2019-07-16 NOTE — ROUTINE PROCESS
Bedside and Verbal shift change report given to FARIDA James (oncoming nurse) by Toy Tellez (offgoing nurse). Report included the following information SBAR and Kardex.

## 2019-07-16 NOTE — ROUTINE PROCESS
Bedside and Verbal shift change report given to Nurse Marly Serra RN (oncoming nurse) by Meng Hagen RN 
 (offgoing nurse). Report included the following information SBAR, Kardex, ED Summary, Intake/Output, MAR and Recent Results.

## 2019-07-16 NOTE — PROGRESS NOTES
ER RN called to update me on patient status. Earlier hypotensive episode after analgesia had resolved. I had expected status call at 1:30 AM. RN states patient's   BP had initially improved after Ca++ corrected by Nephrology,   Does not recall if shared concern for hypotension at that time. Na+ has corrected to 134, creatinine 4.64,   CO2 is decreasing, 20. Hgb 7.8, Hct 22.1, no stool output,   no active bleeding. RN states patient responds spontaneously,mentating well,    limbs arewarm and dry, no arrhythmias, urine output is over 50 cc/hr   O2 sat 97% on room air    BP remains 80' over 40's pt hour, MAP 54 to 58  RN discussed with Nephrology and requested transfer to ICU from ED. Discussed patient's status with Dr Saulo Hoang, who will accept to ICU       Will transfer care to ICU  Signing off;   Hospitalist service available as needed.     Chantal Reyes, DO

## 2019-07-17 LAB
ACTIN IGG SERPL-ACNC: 3 UNITS (ref 0–19)
ALBUMIN SERPL-MCNC: 2.7 G/DL (ref 3.4–5)
ALBUMIN/GLOB SERPL: 0.9 {RATIO} (ref 0.8–1.7)
ALP SERPL-CCNC: 75 U/L (ref 45–117)
ALT SERPL-CCNC: 78 U/L (ref 13–56)
ANION GAP SERPL CALC-SCNC: 10 MMOL/L (ref 3–18)
ANION GAP SERPL CALC-SCNC: 5 MMOL/L (ref 3–18)
ANION GAP SERPL CALC-SCNC: 5 MMOL/L (ref 3–18)
ANION GAP SERPL CALC-SCNC: 7 MMOL/L (ref 3–18)
ANION GAP SERPL CALC-SCNC: 9 MMOL/L (ref 3–18)
AST SERPL-CCNC: 144 U/L (ref 15–37)
BASOPHILS # BLD: 0 K/UL (ref 0–0.1)
BASOPHILS NFR BLD: 0 % (ref 0–2)
BILIRUB DIRECT SERPL-MCNC: 0.2 MG/DL (ref 0–0.2)
BILIRUB SERPL-MCNC: 0.5 MG/DL (ref 0.2–1)
BUN SERPL-MCNC: 27 MG/DL (ref 7–18)
BUN SERPL-MCNC: 29 MG/DL (ref 7–18)
BUN SERPL-MCNC: 31 MG/DL (ref 7–18)
BUN SERPL-MCNC: 35 MG/DL (ref 7–18)
BUN SERPL-MCNC: 39 MG/DL (ref 7–18)
BUN/CREAT SERPL: 12 (ref 12–20)
BUN/CREAT SERPL: 13 (ref 12–20)
BUN/CREAT SERPL: 13 (ref 12–20)
C DIFF GDH STL QL: POSITIVE
C DIFF TOX A+B STL QL IA: POSITIVE
CALCIUM SERPL-MCNC: 7.7 MG/DL (ref 8.5–10.1)
CALCIUM SERPL-MCNC: 7.7 MG/DL (ref 8.5–10.1)
CALCIUM SERPL-MCNC: 7.9 MG/DL (ref 8.5–10.1)
CALCIUM SERPL-MCNC: 8.1 MG/DL (ref 8.5–10.1)
CALCIUM SERPL-MCNC: 8.4 MG/DL (ref 8.5–10.1)
CHLORIDE SERPL-SCNC: 107 MMOL/L (ref 100–108)
CHLORIDE SERPL-SCNC: 108 MMOL/L (ref 100–108)
CHLORIDE SERPL-SCNC: 109 MMOL/L (ref 100–108)
CHLORIDE SERPL-SCNC: 109 MMOL/L (ref 100–108)
CHLORIDE SERPL-SCNC: 110 MMOL/L (ref 100–108)
CO2 SERPL-SCNC: 19 MMOL/L (ref 21–32)
CO2 SERPL-SCNC: 21 MMOL/L (ref 21–32)
CO2 SERPL-SCNC: 22 MMOL/L (ref 21–32)
CO2 SERPL-SCNC: 24 MMOL/L (ref 21–32)
CO2 SERPL-SCNC: 24 MMOL/L (ref 21–32)
CREAT SERPL-MCNC: 2.21 MG/DL (ref 0.6–1.3)
CREAT SERPL-MCNC: 2.21 MG/DL (ref 0.6–1.3)
CREAT SERPL-MCNC: 2.57 MG/DL (ref 0.6–1.3)
CREAT SERPL-MCNC: 2.86 MG/DL (ref 0.6–1.3)
CREAT SERPL-MCNC: 3.12 MG/DL (ref 0.6–1.3)
DIFFERENTIAL METHOD BLD: ABNORMAL
EBV EA IGG SER-ACNC: <9 U/ML (ref 0–8.9)
EOSINOPHIL # BLD: 0.1 K/UL (ref 0–0.4)
EOSINOPHIL NFR BLD: 2 % (ref 0–5)
ERYTHROCYTE [DISTWIDTH] IN BLOOD BY AUTOMATED COUNT: 13.7 % (ref 11.6–14.5)
FOLATE SERPL-MCNC: 6.4 NG/ML (ref 3.1–17.5)
GGT SERPL-CCNC: 227 U/L (ref 5–55)
GLOBULIN SER CALC-MCNC: 3 G/DL (ref 2–4)
GLUCOSE SERPL-MCNC: 102 MG/DL (ref 74–99)
GLUCOSE SERPL-MCNC: 110 MG/DL (ref 74–99)
GLUCOSE SERPL-MCNC: 113 MG/DL (ref 74–99)
GLUCOSE SERPL-MCNC: 82 MG/DL (ref 74–99)
GLUCOSE SERPL-MCNC: 94 MG/DL (ref 74–99)
HCT VFR BLD AUTO: 21.8 % (ref 35–45)
HCT VFR BLD AUTO: 22.2 % (ref 35–45)
HCT VFR BLD AUTO: 23.1 % (ref 35–45)
HEMOCCULT STL QL: POSITIVE
HGB BLD-MCNC: 7.7 G/DL (ref 12–16)
HGB BLD-MCNC: 7.8 G/DL (ref 12–16)
HGB BLD-MCNC: 8 G/DL (ref 12–16)
INTERPRETATION: ABNORMAL
LYMPHOCYTES # BLD: 1.5 K/UL (ref 0.9–3.6)
LYMPHOCYTES NFR BLD: 32 % (ref 21–52)
MAGNESIUM SERPL-MCNC: 1.5 MG/DL (ref 1.6–2.6)
MAGNESIUM SERPL-MCNC: 1.5 MG/DL (ref 1.6–2.6)
MAGNESIUM SERPL-MCNC: 1.6 MG/DL (ref 1.6–2.6)
MAGNESIUM SERPL-MCNC: 1.7 MG/DL (ref 1.6–2.6)
MAGNESIUM SERPL-MCNC: 1.7 MG/DL (ref 1.6–2.6)
MCH RBC QN AUTO: 36 PG (ref 24–34)
MCHC RBC AUTO-ENTMCNC: 34.6 G/DL (ref 31–37)
MCV RBC AUTO: 104.1 FL (ref 74–97)
MONOCYTES # BLD: 0.3 K/UL (ref 0.05–1.2)
MONOCYTES NFR BLD: 6 % (ref 3–10)
NEUTS SEG # BLD: 2.7 K/UL (ref 1.8–8)
NEUTS SEG NFR BLD: 60 % (ref 40–73)
PHOSPHATE SERPL-MCNC: 2 MG/DL (ref 2.5–4.9)
PHOSPHATE SERPL-MCNC: 2.4 MG/DL (ref 2.5–4.9)
PHOSPHATE SERPL-MCNC: 2.5 MG/DL (ref 2.5–4.9)
PHOSPHATE SERPL-MCNC: 2.6 MG/DL (ref 2.5–4.9)
PHOSPHATE SERPL-MCNC: 2.6 MG/DL (ref 2.5–4.9)
PLATELET # BLD AUTO: 142 K/UL (ref 135–420)
PMV BLD AUTO: 10 FL (ref 9.2–11.8)
POTASSIUM SERPL-SCNC: 3.8 MMOL/L (ref 3.5–5.5)
POTASSIUM SERPL-SCNC: 3.9 MMOL/L (ref 3.5–5.5)
POTASSIUM SERPL-SCNC: 3.9 MMOL/L (ref 3.5–5.5)
POTASSIUM SERPL-SCNC: 4.2 MMOL/L (ref 3.5–5.5)
POTASSIUM SERPL-SCNC: 4.3 MMOL/L (ref 3.5–5.5)
PROT SERPL-MCNC: 5.7 G/DL (ref 6.4–8.2)
RBC # BLD AUTO: 2.22 M/UL (ref 4.2–5.3)
SODIUM SERPL-SCNC: 137 MMOL/L (ref 136–145)
SODIUM SERPL-SCNC: 137 MMOL/L (ref 136–145)
SODIUM SERPL-SCNC: 138 MMOL/L (ref 136–145)
SODIUM SERPL-SCNC: 138 MMOL/L (ref 136–145)
SODIUM SERPL-SCNC: 139 MMOL/L (ref 136–145)
VANCOMYCIN SERPL-MCNC: 13.3 UG/ML (ref 5–40)
VIT B12 SERPL-MCNC: 339 PG/ML (ref 211–911)
WBC # BLD AUTO: 4.6 K/UL (ref 4.6–13.2)
WBC #/AREA STL HPF: NORMAL /HPF

## 2019-07-17 PROCEDURE — 80076 HEPATIC FUNCTION PANEL: CPT

## 2019-07-17 PROCEDURE — 80048 BASIC METABOLIC PNL TOTAL CA: CPT

## 2019-07-17 PROCEDURE — 74011250636 HC RX REV CODE- 250/636: Performed by: NURSE PRACTITIONER

## 2019-07-17 PROCEDURE — 82607 VITAMIN B-12: CPT

## 2019-07-17 PROCEDURE — 85025 COMPLETE CBC W/AUTO DIFF WBC: CPT

## 2019-07-17 PROCEDURE — 74011000258 HC RX REV CODE- 258: Performed by: INTERNAL MEDICINE

## 2019-07-17 PROCEDURE — 74011000258 HC RX REV CODE- 258

## 2019-07-17 PROCEDURE — 65660000000 HC RM CCU STEPDOWN

## 2019-07-17 PROCEDURE — 36415 COLL VENOUS BLD VENIPUNCTURE: CPT

## 2019-07-17 PROCEDURE — 74011000250 HC RX REV CODE- 250: Performed by: INTERNAL MEDICINE

## 2019-07-17 PROCEDURE — 83735 ASSAY OF MAGNESIUM: CPT

## 2019-07-17 PROCEDURE — 94762 N-INVAS EAR/PLS OXIMTRY CONT: CPT

## 2019-07-17 PROCEDURE — 74011250636 HC RX REV CODE- 250/636: Performed by: INTERNAL MEDICINE

## 2019-07-17 PROCEDURE — 74011250637 HC RX REV CODE- 250/637: Performed by: HOSPITALIST

## 2019-07-17 PROCEDURE — 85018 HEMOGLOBIN: CPT

## 2019-07-17 PROCEDURE — 74011250636 HC RX REV CODE- 250/636: Performed by: EMERGENCY MEDICINE

## 2019-07-17 PROCEDURE — 80202 ASSAY OF VANCOMYCIN: CPT

## 2019-07-17 PROCEDURE — 81256 HFE GENE: CPT

## 2019-07-17 PROCEDURE — 74011250636 HC RX REV CODE- 250/636

## 2019-07-17 PROCEDURE — 84100 ASSAY OF PHOSPHORUS: CPT

## 2019-07-17 RX ORDER — CIPROFLOXACIN 500 MG/1
500 TABLET ORAL EVERY 12 HOURS
Status: DISCONTINUED | OUTPATIENT
Start: 2019-07-17 | End: 2019-07-18

## 2019-07-17 RX ORDER — VANCOMYCIN/0.9 % SOD CHLORIDE 1.5G/250ML
1500 PLASTIC BAG, INJECTION (ML) INTRAVENOUS ONCE
Status: COMPLETED | OUTPATIENT
Start: 2019-07-17 | End: 2019-07-17

## 2019-07-17 RX ORDER — MAGNESIUM SULFATE 1 G/100ML
1 INJECTION INTRAVENOUS ONCE
Status: COMPLETED | OUTPATIENT
Start: 2019-07-17 | End: 2019-07-18

## 2019-07-17 RX ORDER — METRONIDAZOLE 500 MG/1
500 TABLET ORAL 3 TIMES DAILY
Status: DISCONTINUED | OUTPATIENT
Start: 2019-07-17 | End: 2019-07-18

## 2019-07-17 RX ADMIN — PIPERACILLIN SODIUM,TAZOBACTAM SODIUM 2.25 G: 2; .25 INJECTION, POWDER, FOR SOLUTION INTRAVENOUS at 09:39

## 2019-07-17 RX ADMIN — Medication 10 ML: at 05:49

## 2019-07-17 RX ADMIN — METRONIDAZOLE 500 MG: 500 TABLET, FILM COATED ORAL at 16:19

## 2019-07-17 RX ADMIN — THIAMINE HYDROCHLORIDE 500 MG: 100 INJECTION, SOLUTION INTRAMUSCULAR; INTRAVENOUS at 16:11

## 2019-07-17 RX ADMIN — CIPROFLOXACIN HYDROCHLORIDE 500 MG: 500 TABLET, FILM COATED ORAL at 20:35

## 2019-07-17 RX ADMIN — MAGNESIUM SULFATE 1 G: 1 INJECTION INTRAVENOUS at 22:13

## 2019-07-17 RX ADMIN — Medication 10 ML: at 16:11

## 2019-07-17 RX ADMIN — Medication 2 CAPSULE: at 16:19

## 2019-07-17 RX ADMIN — Medication 10 ML: at 16:21

## 2019-07-17 RX ADMIN — Medication 2 CAPSULE: at 09:39

## 2019-07-17 RX ADMIN — SODIUM CHLORIDE 100 ML/HR: 900 INJECTION, SOLUTION INTRAVENOUS at 15:55

## 2019-07-17 RX ADMIN — Medication 10 ML: at 05:50

## 2019-07-17 RX ADMIN — METRONIDAZOLE 500 MG: 500 TABLET, FILM COATED ORAL at 22:14

## 2019-07-17 RX ADMIN — THIAMINE HYDROCHLORIDE 500 MG: 100 INJECTION, SOLUTION INTRAMUSCULAR; INTRAVENOUS at 05:49

## 2019-07-17 RX ADMIN — Medication 10 ML: at 22:15

## 2019-07-17 RX ADMIN — HEPARIN SODIUM 5000 UNITS: 5000 INJECTION INTRAVENOUS; SUBCUTANEOUS at 06:19

## 2019-07-17 RX ADMIN — VANCOMYCIN HYDROCHLORIDE 1500 MG: 10 INJECTION, POWDER, LYOPHILIZED, FOR SOLUTION INTRAVENOUS at 16:24

## 2019-07-17 RX ADMIN — PIPERACILLIN SODIUM,TAZOBACTAM SODIUM 2.25 G: 2; .25 INJECTION, POWDER, FOR SOLUTION INTRAVENOUS at 05:49

## 2019-07-17 NOTE — ROUTINE PROCESS
Bedside and Verbal shift change report given to    (oncoming nurse) by Ulises Merino RN   (offgoing nurse). Report included the following information SBAR, MAR and Cardiac Rhythm . Jacki Chan

## 2019-07-17 NOTE — CONSULTS
[email or web address]  [phone]      GASTROENTEROLOGY BRIEF NOTE  Chart reviewed. No additional GI intervention is planned at this time. Evaluation of fecal occult finding appropriate as outpatient. C. diff management per primary team. No signs of overt GI bleed in setting of anemia. Was noted to have elevated iron and iron sat. Will follow outpatient. Thank you for this consultation and the opportunity to participate in the care of this patient. Please do not hesitate to call with any questions or concerns, or should event occur that may necessitate additional GI evaluation. BRIAN Zuleta. [facility] [address]  Cell: [phone]  [email or web address]

## 2019-07-17 NOTE — PROGRESS NOTES
DISCHARGE PLANNING:  Reason for Admission: SUYAPA                   RRAT Score:   7                  Plan for utilizing home health: NONE                         Current Advanced Directive/Advance Care Plan: NOT ON LINE                         Transition of Care Plan:     HOMELESS SHELTER    Care Management Interventions  PCP Verified by CM: Yes(Renny Eduardo)  Mode of Transport at Discharge: Other (see comment)(PT WILL REQUIRE ASSISTANCE FOR TRANSPORTATION)  Transition of Care Consult (CM Consult): Discharge Planning  Current Support Network: Shelter(PT REPORTS THAT SHE IS HOMELESS, AND LIVES UNDER A LOCAL BRIDGE PT STATES SHE IS SEEKING HOUSING IN THE [place]  AREA. )  Confirm Follow Up Transport: Other (see comment)(WILL REQUIRE TRANSPORT ASSISTANCE)  Plan discussed with Pt/Family/Caregiver: Yes(SPOKE WITH THIS PT WHO STATES SHE IS HOMELESS AND LIVES UNDER A LOCAL BRIDGE. PT  IS ON THE HOUSING AUTHORITY LIST FOR THE CITY  OF [place]. PT STATES SHE PRFERS TO LIVE IN [place] AND PREFERS TO NOT TO LIVE IN [place])  Discharge Location  Discharge Placement: Homeless    Pt  Requested that this writer contact Shaym Connors.      [facility]

## 2019-07-17 NOTE — PROGRESS NOTES
RENAL DAILY PROGRESS NOTE    Subjective:     Admitted for hypotension/diarrhea, seen for SUYAPA    Complaint: Feels better no CP or SOB, increasing urine output, + diarrhea       Current Facility-Administered Medications   Medication Dose Route Frequency    heparin (porcine) injection 5,000 Units  5,000 Units SubCUTAneous Q8H    piperacillin-tazobactam (ZOSYN) 2.25 g in dextrose 5% (MBP/ADV) 50 mL MBP  2.25 g IntraVENous Q8H    thiamine (B-1) 500 mg in dextrose 5% 50 mL IVPB  500 mg IntraVENous Q8H    0.9% sodium chloride infusion  100 mL/hr IntraVENous CONTINUOUS    sodium chloride (NS) flush 5-10 mL  5-10 mL IntraVENous PRN    VANCOMYCIN INFORMATION NOTE   Other CONTINUOUS    amino acids/multivit with iron (CHAPIS-RECOVER) capsule 2 Cap  2 Cap Oral TID WITH MEALS    trimethobenzamide (TIGAN) injection 200 mg  200 mg IntraMUSCular Q4H PRN    sodium chloride (NS) flush 5-40 mL  5-40 mL IntraVENous Q8H    sodium chloride (NS) flush 5-40 mL  5-40 mL IntraVENous PRN    [START ON 7/18/2019] thiamine (B-1) 250 mg in 0.9% sodium chloride 50 mL IVPB  250 mg IntraVENous DAILY    sodium chloride (NS) flush 5-40 mL  5-40 mL IntraVENous Q8H    sodium chloride (NS) flush 5-40 mL  5-40 mL IntraVENous PRN    HYDROmorphone (DILAUDID) injection 1 mg  1 mg IntraVENous Q3H PRN    naloxone (NARCAN) injection 0.4 mg  0.4 mg IntraVENous EVERY 2 MINUTES AS NEEDED           Objective:     Patient Vitals for the past 24 hrs:   Temp Pulse Resp BP SpO2   07/17/19 0850 98.2 °F (36.8 °C)       07/17/19 0400 98 °F (36.7 °C) 82 13 99/70 100 %   07/17/19 0000 98.4 °F (36.9 °C) 99 20 91/54 100 %   07/16/19 2300  (!) 110 21  100 %   07/16/19 2200  90 23 118/79 97 %   07/16/19 2100  86 19     07/16/19 2019  (!) 101 20 95/57    07/16/19 2000 98.2 °F (36.8 °C) 92 24 (!) 77/40 100 %   07/16/19 1900  (!) 103 25     07/16/19 1800 98.1 °F (36.7 °C) (!) 107 20 104/51    07/16/19 1608 98.2 °F (36.8 °C) 88 20 99/55    07/16/19 1500  84 17     07/16/19 1400  82 21 107/61    07/16/19 1300  78 17     07/16/19 1200 98.1 °F (36.7 °C) 79 14 104/62    07/16/19 1100  82 16 (!) 85/71         Weight change:      07/15 1901 - 07/17 0700  In: 6877.8 [P.O.:480; I.V.:6397.8]  Out: 4250 [Urine:4100]    Intake/Output Summary (Last 24 hours) at 7/17/2019 1047  Last data filed at 7/17/2019 0000  Gross per 24 hour   Intake 1573.33 ml   Output 2850 ml   Net -1276.67 ml     Physical Exam: alert, oriented x 3 afebrile    HEENT: non icteric, pinkish conj  Neck: No JVD  Cardiovascular: regular, no rub  C/L: clear ant/lat  Abdomen: soft + BS  Ext: No LE edema    Data Review:     LABS:   Hematology:   Recent Labs     07/17/19  0500 07/17/19  0023 07/16/19  1547 07/16/19  0405 07/16/19  0130 07/15/19  1526   WBC 4.6  --  5.8 6.0  --  9.8   HGB 8.0* 7.8* 8.4* 7.6* 7.8* 10.3*   HCT 23.1* 22.2* 24.4* 21.6* 22.1* 29.1*   Pl 142K  Chemistry:   Recent Labs     07/17/19  0500 07/17/19  0023 07/16/19 2014 07/16/19  1547 07/16/19  1155 07/16/19  0926 07/16/19  0405 07/15/19  2214 07/15/19  1526   BUN 35* 39* 41* 44* 44* 46* 50* 49* 61*   CREA 2.86* 3.12* 3.51* 3.62* 3.89* 4.20* 4.46* 4.64* 7.05*   CA 7.7* 7.7* 8.1* 8.5 8.3* 8.4* 7.0* 5.9* 8.5   ALB 2.7*  --   --   --   --   --  2.5*  --  4.1   K 3.9 3.8 3.9 3.6 3.7 3.7 3.9 4.0 3.4*    137 133* 136 134* 134* 134* 134* 122*    107 105 105 103 103 104 103 82*   CO2 19* 21 20* 20* 21 21 19* 20* 26   PHOS 2.6 2.5 2.6 2.6 3.5 3.3  --  3.8 6.9*   GLU 82 94 96 103* 77 79 89 87 112*      Mag 1.6  Lipase 161  Iron 219 sat 94%  CULTURE: Blood c/s neg so far  Stool + C. diff    IMPRESSION AND PLAN:   SUYAPA, non oliguric ATN in the setting of GI losses, ACEI/HCTZ, improving slowly. Cont to trend for further improvement. Avoid nephrotoxic drugs and adjust all meds to renal function.   Hyponatremia resolved cont with NS to replace GI losses  Anemia trend Hgb   C. diff treatment per primary team           Latricia Mei Cabrera MD  7/17/2019

## 2019-07-18 LAB
ANA TITR SER IF: NEGATIVE {TITER}
ANION GAP SERPL CALC-SCNC: 6 MMOL/L (ref 3–18)
ANION GAP SERPL CALC-SCNC: 6 MMOL/L (ref 3–18)
BASOPHILS # BLD: 0 K/UL (ref 0–0.1)
BASOPHILS NFR BLD: 1 % (ref 0–2)
BUN SERPL-MCNC: 19 MG/DL (ref 7–18)
BUN SERPL-MCNC: 24 MG/DL (ref 7–18)
BUN/CREAT SERPL: 10 (ref 12–20)
BUN/CREAT SERPL: 12 (ref 12–20)
CALCIUM SERPL-MCNC: 8 MG/DL (ref 8.5–10.1)
CALCIUM SERPL-MCNC: 8.6 MG/DL (ref 8.5–10.1)
CHLORIDE SERPL-SCNC: 107 MMOL/L (ref 100–111)
CHLORIDE SERPL-SCNC: 110 MMOL/L (ref 100–111)
CO2 SERPL-SCNC: 23 MMOL/L (ref 21–32)
CO2 SERPL-SCNC: 24 MMOL/L (ref 21–32)
CREAT SERPL-MCNC: 1.94 MG/DL (ref 0.6–1.3)
CREAT SERPL-MCNC: 2.06 MG/DL (ref 0.6–1.3)
DIFFERENTIAL METHOD BLD: ABNORMAL
EOSINOPHIL # BLD: 0.1 K/UL (ref 0–0.4)
EOSINOPHIL NFR BLD: 3 % (ref 0–5)
ERYTHROCYTE [DISTWIDTH] IN BLOOD BY AUTOMATED COUNT: 14.1 % (ref 11.6–14.5)
G LAMBLIA AG STL QL IA: NEGATIVE
GLUCOSE BLD STRIP.AUTO-MCNC: 100 MG/DL (ref 70–110)
GLUCOSE SERPL-MCNC: 106 MG/DL (ref 74–99)
GLUCOSE SERPL-MCNC: 94 MG/DL (ref 74–99)
HCT VFR BLD AUTO: 20.6 % (ref 35–45)
HCT VFR BLD AUTO: 21.7 % (ref 35–45)
HGB BLD-MCNC: 7 G/DL (ref 12–16)
HGB BLD-MCNC: 7.6 G/DL (ref 12–16)
LYMPHOCYTES # BLD: 1.2 K/UL (ref 0.9–3.6)
LYMPHOCYTES NFR BLD: 34 % (ref 21–52)
MAGNESIUM SERPL-MCNC: 1.6 MG/DL (ref 1.6–2.6)
MAGNESIUM SERPL-MCNC: 1.6 MG/DL (ref 1.6–2.6)
MCH RBC QN AUTO: 35.7 PG (ref 24–34)
MCHC RBC AUTO-ENTMCNC: 34 G/DL (ref 31–37)
MCV RBC AUTO: 105.1 FL (ref 74–97)
MONOCYTES # BLD: 0.3 K/UL (ref 0.05–1.2)
MONOCYTES NFR BLD: 9 % (ref 3–10)
NEUTS SEG # BLD: 1.9 K/UL (ref 1.8–8)
NEUTS SEG NFR BLD: 53 % (ref 40–73)
PHOSPHATE SERPL-MCNC: 2.5 MG/DL (ref 2.5–4.9)
PHOSPHATE SERPL-MCNC: 2.7 MG/DL (ref 2.5–4.9)
PLATELET # BLD AUTO: 141 K/UL (ref 135–420)
PMV BLD AUTO: 9.5 FL (ref 9.2–11.8)
POTASSIUM SERPL-SCNC: 4.2 MMOL/L (ref 3.5–5.5)
POTASSIUM SERPL-SCNC: 4.2 MMOL/L (ref 3.5–5.5)
RBC # BLD AUTO: 1.96 M/UL (ref 4.2–5.3)
SODIUM SERPL-SCNC: 137 MMOL/L (ref 136–145)
SODIUM SERPL-SCNC: 139 MMOL/L (ref 136–145)
SPECIMEN SOURCE: NORMAL
WBC # BLD AUTO: 3.5 K/UL (ref 4.6–13.2)

## 2019-07-18 PROCEDURE — 80048 BASIC METABOLIC PNL TOTAL CA: CPT

## 2019-07-18 PROCEDURE — 84100 ASSAY OF PHOSPHORUS: CPT

## 2019-07-18 PROCEDURE — 74011250637 HC RX REV CODE- 250/637: Performed by: HOSPITALIST

## 2019-07-18 PROCEDURE — 36415 COLL VENOUS BLD VENIPUNCTURE: CPT

## 2019-07-18 PROCEDURE — 82962 GLUCOSE BLOOD TEST: CPT

## 2019-07-18 PROCEDURE — 83735 ASSAY OF MAGNESIUM: CPT

## 2019-07-18 PROCEDURE — 74011250636 HC RX REV CODE- 250/636: Performed by: INTERNAL MEDICINE

## 2019-07-18 PROCEDURE — 74011250636 HC RX REV CODE- 250/636: Performed by: HOSPITALIST

## 2019-07-18 PROCEDURE — 85025 COMPLETE CBC W/AUTO DIFF WBC: CPT

## 2019-07-18 PROCEDURE — 65660000000 HC RM CCU STEPDOWN

## 2019-07-18 PROCEDURE — 85018 HEMOGLOBIN: CPT

## 2019-07-18 RX ORDER — VANCOMYCIN HYDROCHLORIDE 250 MG/5ML
125 POWDER, FOR SOLUTION ORAL EVERY 6 HOURS
Status: DISCONTINUED | OUTPATIENT
Start: 2019-07-18 | End: 2019-07-19 | Stop reason: HOSPADM

## 2019-07-18 RX ORDER — MAGNESIUM SULFATE 1 G/100ML
1 INJECTION INTRAVENOUS ONCE
Status: COMPLETED | OUTPATIENT
Start: 2019-07-18 | End: 2019-07-18

## 2019-07-18 RX ADMIN — VANCOMYCIN HYDROCHLORIDE 125 MG: KIT at 17:31

## 2019-07-18 RX ADMIN — MAGNESIUM SULFATE 1 G: 1 INJECTION INTRAVENOUS at 06:18

## 2019-07-18 RX ADMIN — Medication 2 CAPSULE: at 17:30

## 2019-07-18 RX ADMIN — Medication 10 ML: at 05:34

## 2019-07-18 RX ADMIN — Medication 2 CAPSULE: at 13:44

## 2019-07-18 RX ADMIN — CIPROFLOXACIN HYDROCHLORIDE 500 MG: 500 TABLET, FILM COATED ORAL at 08:40

## 2019-07-18 RX ADMIN — METRONIDAZOLE 500 MG: 500 TABLET, FILM COATED ORAL at 08:39

## 2019-07-18 RX ADMIN — SODIUM CHLORIDE 100 ML/HR: 900 INJECTION, SOLUTION INTRAVENOUS at 05:35

## 2019-07-18 RX ADMIN — VANCOMYCIN HYDROCHLORIDE 125 MG: KIT at 23:18

## 2019-07-18 RX ADMIN — Medication 2 CAPSULE: at 08:39

## 2019-07-18 NOTE — PROGRESS NOTES
Discharge Planning:  Discussed with Dr. Effie Odom. Pt is homeless and as of now, discharge plan is to homeless shelter. Pt is positive for C-diff. Discharge is pending when pt is C-diff negative. Case Management is following.     HOLLIS Contreras RN  Care Management  Pager: [phone]

## 2019-07-18 NOTE — ROUTINE PROCESS
Bedside and Verbal shift change report given to Jef Roblero (oncoming nurse) by [facility] RN (offgoing nurse). Report included the following information SBAR, Kardex, MAR and Recent Results.

## 2019-07-18 NOTE — PROGRESS NOTES
Patient refuses to have PIV placed. States [facility]SON has told her she no longer needs it. I have informed her of the need for PIV due to IV drugs she is to receive and she still wishes not to have one placed.

## 2019-07-18 NOTE — PROGRESS NOTES
[address] [place] [address]  ANTIMICROBIAL [employer] TEAM  PRESCRIBER COMMUNICATION FORM      We have briefly reviewed the antimicrobials  currently prescribed for your patient along with  the clinical indications and would like to Make the following recommendations:    DISCONTINUE ANTIBIOTICS ciprofloxacin, metronidazole      Rationale:    (  )  No evidence of bacterial infection    (  )  Microbiology report does not support use    (  )  Narrowing broad-spectrum empiric coverage    (  )  Therapeutic duplication: overlapping antimicrobial spectrum    (x)  Clinical situation dictates change    (  )  Prolonged duration of therapy not needed    (  )  Allergy/toxicity/drug interaction present    (  ) More clinically/cost effective therapy available  ADD ANTIBIOTICS            Vancomycin po  Rationale:        (  ) Microbiology report supports use    (  ) Expanded coverage needed: gm positive /negative / anaerobic /                               atypical    (x) More clinicaly/cost effective therapy than current regimen    ( ) Needed for synergy    ( ) Double coverage needed    ( ) Less toxic therapy    ( ) Antifungal therapy needed  ADDITIONAL SUGGESTIONS    ( ) continue current therapy with the following change    ( ) additional laboratory testing    ( ) consider infectious disease consultation    ( ) consider outpatient IV therapy    ( ) other    COMMENTS: po vancomycin is considered first line treatment for c.diff per IDSA guidelines 2018. No significant pyuria. Please discontinue ciprofloxacin if low clinical suspicion of cystitis. Ciprofloxacin will delay recovery from c.diff thanks. This communication is not to be considered a consultation. You are under no obligation to follow these suggestions. A physician-patient relationship has not been established between the physician Completing this form and your patient. If a thorough analysis of the case is desired, please request an ID consultation.

## 2019-07-18 NOTE — PROGRESS NOTES
RENAL DAILY PROGRESS NOTE    Subjective:     Admitted for hypotension/diarrhea, seen for SUYAPA    Complaint: Feels better no CP or SOB, increasing urine output, improving diarrhea and appetite      Current Facility-Administered Medications   Medication Dose Route Frequency    metroNIDAZOLE (FLAGYL) tablet 500 mg  500 mg Oral TID    ciprofloxacin HCl (CIPRO) tablet 500 mg  500 mg Oral Q12H    heparin (porcine) injection 5,000 Units  5,000 Units SubCUTAneous Q8H    0.9% sodium chloride infusion  100 mL/hr IntraVENous CONTINUOUS    sodium chloride (NS) flush 5-10 mL  5-10 mL IntraVENous PRN    amino acids/multivit with iron (CHAPIS-RECOVER) capsule 2 Cap  2 Cap Oral TID WITH MEALS    sodium chloride (NS) flush 5-40 mL  5-40 mL IntraVENous Q8H    sodium chloride (NS) flush 5-40 mL  5-40 mL IntraVENous PRN    thiamine (B-1) 250 mg in 0.9% sodium chloride 50 mL IVPB  250 mg IntraVENous DAILY    sodium chloride (NS) flush 5-40 mL  5-40 mL IntraVENous Q8H    sodium chloride (NS) flush 5-40 mL  5-40 mL IntraVENous PRN    HYDROmorphone (DILAUDID) injection 1 mg  1 mg IntraVENous Q3H PRN    naloxone (NARCAN) injection 0.4 mg  0.4 mg IntraVENous EVERY 2 MINUTES AS NEEDED           Objective:     Patient Vitals for the past 24 hrs:   Temp Pulse Resp BP SpO2   07/18/19 0800 97.9 °F (36.6 °C) 79 22 117/80 99 %   07/18/19 0400 98.4 °F (36.9 °C) 76 23 116/76 98 %   07/18/19 0000 98.4 °F (36.9 °C) 80 20 105/60 99 %   07/17/19 2000 97.8 °F (36.6 °C) 78 20 122/68 100 %   07/17/19 1619    106/56    07/17/19 1200 98.3 °F (36.8 °C) 88 22  100 %   07/17/19 1100  85 23  99 %        Weight change:      07/16 1901 - 07/18 0700  In: 8669 [P.O.:240;  I.V.:1500]  Out: 2850 [Urine:2700]    Intake/Output Summary (Last 24 hours) at 7/18/2019 1011  Last data filed at 7/18/2019 1002  Gross per 24 hour   Intake 2080 ml   Output 2000 ml   Net 80 ml     Physical Exam: alert, oriented x 3 afebrile    HEENT: non icteric, pinkish conj  Neck: No JVD  Cardiovascular: regular, no rub  C/L: clear ant/lat  Abdomen: soft + BS  Ext: No LE edema    Data Review:     LABS:   Hematology:   Recent Labs     07/18/19  0323 07/17/19  1100 07/17/19  0500 07/17/19  0023 07/16/19  1547 07/16/19  0405 07/16/19  0130 07/15/19  1526   WBC 3.5*  --  4.6  --  5.8 6.0  --  9.8   HGB 7.0* 7.7* 8.0* 7.8* 8.4* 7.6* 7.8* 10.3*   HCT 20.6* 21.8* 23.1* 22.2* 24.4* 21.6* 22.1* 29.1*   Pl 141K  Chemistry:   Recent Labs     07/18/19  0323 07/17/19  1957 07/17/19  1604 07/17/19  1100 07/17/19  0500 07/17/19  0023 07/16/19  2014 07/16/19  1547 07/16/19  1155 07/16/19  0926 07/16/19  0405 07/15/19  2214 07/15/19  1526   BUN 24* 27* 29* 31* 35* 39* 41* 44* 44* 46* 50* 49* 61*   CREA 2.06* 2.21* 2.21* 2.57* 2.86* 3.12* 3.51* 3.62* 3.89* 4.20* 4.46* 4.64* 7.05*   CA 8.0* 8.1* 8.4* 7.9* 7.7* 7.7* 8.1* 8.5 8.3* 8.4* 7.0* 5.9* 8.5   ALB  --   --   --   --  2.7*  --   --   --   --   --  2.5*  --  4.1   K 4.2 4.3 4.2 3.9 3.9 3.8 3.9 3.6 3.7 3.7 3.9 4.0 3.4*    139 138 138 137 137 133* 136 134* 134* 134* 134* 122*    110* 109* 109* 108 107 105 105 103 103 104 103 82*   CO2 23 24 24 22 19* 21 20* 20* 21 21 19* 20* 26   PHOS 2.7 2.4* 2.6 2.0* 2.6 2.5 2.6 2.6 3.5 3.3  --  3.8 6.9*   GLU 94 113* 102* 110* 82 94 96 103* 77 79 89 87 112*      Mag 1.6  Lipase 161  Iron 219 sat 94%  CULTURE: Blood c/s neg so far  Stool + C. diff    IMPRESSION AND PLAN:   SUYAPA, non oliguric ATN in the setting of GI losses, ACEI/HCTZ, improving slowly. Cont to trend for further improvement. Avoid nephrotoxic drugs and adjust all meds to renal function. Hyponatremia resolved titrate NS , inc po fluids  Hypotension much better, remains off ACEI/HCTZ.  Would recommend holding until renal function returns to normal and have her follow up with PCP as outpt  Anemia trend Hgb , defer to primary team  C. diff treatment per primary team           Bayron Ponce MD  7/18/2019

## 2019-07-18 NOTE — ROUTINE PROCESS
Bedside and Verbal shift change report given to Aristeo Alexandre RN (oncoming nurse) by Shakir Liu RN (offgoing nurse).  Report included the following information SBAR, Intake/Output, MAR, Recent Results and Cardiac Rhythm SR.

## 2019-07-18 NOTE — PROGRESS NOTES
Problem: Risk for Spread of Infection  Goal: Prevent transmission of infectious organism to others  Description  Prevent the transmission of infectious organisms to other patients, staff members, and visitors. Outcome: Progressing Towards Goal     Problem: Patient Education:  Go to Education Activity  Goal: Patient/Family Education  Outcome: Progressing Towards Goal     Problem: Falls - Risk of  Goal: *Absence of Falls  Description  Document Veronica Hunger Fall Risk and appropriate interventions in the flowsheet. Outcome: Progressing Towards Goal  Note:   Fall Risk Interventions:  Mobility Interventions: Communicate number of staff needed for ambulation/transfer         Medication Interventions: Evaluate medications/consider consulting pharmacy, Patient to call before getting OOB, Teach patient to arise slowly    Elimination Interventions: Call light in reach, Patient to call for help with toileting needs, Toileting schedule/hourly rounds, Toilet paper/wipes in reach              Problem: Patient Education: Go to Patient Education Activity  Goal: Patient/Family Education  Outcome: Progressing Towards Goal     Problem: Pressure Injury - Risk of  Goal: *Prevention of pressure injury  Description  Document Ant Scale and appropriate interventions in the flowsheet. Outcome: Progressing Towards Goal  Note:   Pressure Injury Interventions:       Moisture Interventions: Absorbent underpads, Assess need for specialty bed, Check for incontinence Q2 hours and as needed, Maintain skin hydration (lotion/cream), Minimize layers, Moisture barrier    Activity Interventions: Assess need for specialty bed, Increase time out of bed, Pressure redistribution bed/mattress(bed type)    Mobility Interventions: Assess need for specialty bed, HOB 30 degrees or less, Pressure redistribution bed/mattress (bed type), Turn and reposition approx.  every two hours(pillow and wedges)    Nutrition Interventions: Document food/fluid/supplement intake    Friction and Shear Interventions: HOB 30 degrees or less, Lift sheet, Minimize layers, Transferring/repositioning devices                Problem: Patient Education: Go to Patient Education Activity  Goal: Patient/Family Education  Outcome: Progressing Towards Goal     Problem: Injury - Risk of, Adverse Drug Event  Goal: *Absence of adverse drug events  Outcome: Progressing Towards Goal  Goal: *Absence of medication errors  Outcome: Progressing Towards Goal  Goal: *Knowledge of prescribed medications  Outcome: Progressing Towards Goal     Problem: Patient Education: Go to Patient Education Activity  Goal: Patient/Family Education  Outcome: Progressing Towards Goal     Problem: Hypotension  Goal: *Blood pressure within specified parameters  Outcome: Progressing Towards Goal  Goal: *Fluid volume balance  Outcome: Progressing Towards Goal  Goal: *Labs within defined limits  Outcome: Progressing Towards Goal     Problem: Patient Education: Go to Patient Education Activity  Goal: Patient/Family Education  Outcome: Progressing Towards Goal     Problem: Patient Education: Go to Patient Education Activity  Goal: Patient/Family Education  Outcome: Progressing Towards Goal     Problem: Acute Renal Failure: Day 1  Goal: Activity/Safety  Outcome: Progressing Towards Goal  Goal: Consults, if ordered  Outcome: Progressing Towards Goal  Goal: Diagnostic Test/Procedures  Outcome: Progressing Towards Goal  Goal: Nutrition/Diet  Outcome: Progressing Towards Goal  Goal: Discharge Planning  Outcome: Progressing Towards Goal  Goal: Medications  Outcome: Progressing Towards Goal  Goal: Respiratory  Outcome: Progressing Towards Goal  Goal: Treatments/Interventions/Procedures  Outcome: Progressing Towards Goal  Goal: Psychosocial  Outcome: Progressing Towards Goal  Goal: *Optimal pain control at patient's stated goal  Outcome: Progressing Towards Goal  Goal: *Urinary output within identified parameters  Outcome: Progressing Towards Goal  Goal: *Hemodynamically stable  Outcome: Progressing Towards Goal  Goal: *Tolerating diet  Outcome: Progressing Towards Goal     Problem: Acute Renal Failure: Day 2  Goal: Activity/Safety  Outcome: Progressing Towards Goal  Goal: Consults, if ordered  Outcome: Progressing Towards Goal  Goal: Diagnostic Test/Procedures  Outcome: Progressing Towards Goal  Goal: Nutrition/Diet  Outcome: Progressing Towards Goal  Goal: Discharge Planning  Outcome: Progressing Towards Goal  Goal: Medications  Outcome: Progressing Towards Goal  Goal: Respiratory  Outcome: Progressing Towards Goal  Goal: Treatments/Interventions/Procedures  Outcome: Progressing Towards Goal  Goal: Psychosocial  Outcome: Progressing Towards Goal  Goal: *Optimal pain control at patient's stated goal  Outcome: Progressing Towards Goal  Goal: *Urinary output within identified parameters  Outcome: Progressing Towards Goal  Goal: *Hemodynamically stable  Outcome: Progressing Towards Goal  Goal: *Tolerating diet  Outcome: Progressing Towards Goal  Goal: *Lab values improving  Outcome: Progressing Towards Goal     Problem: Acute Renal Failure: Day 3  Goal: Activity/Safety  Outcome: Progressing Towards Goal  Goal: Consults, if ordered  Outcome: Progressing Towards Goal  Goal: Diagnostic Test/Procedures  Outcome: Progressing Towards Goal  Goal: Nutrition/Diet  Outcome: Progressing Towards Goal  Goal: Discharge Planning  Outcome: Progressing Towards Goal  Goal: Medications  Outcome: Progressing Towards Goal  Goal: Respiratory  Outcome: Progressing Towards Goal  Goal: Treatments/Interventions/Procedures  Outcome: Progressing Towards Goal  Goal: Psychosocial  Outcome: Progressing Towards Goal  Goal: *Optimal pain control at patient's stated goal  Outcome: Progressing Towards Goal  Goal: *Urinary output within identified parameters  Outcome: Progressing Towards Goal  Goal: *Hemodynamically stable  Outcome: Progressing Towards Goal  Goal: *Tolerating diet  Outcome: Progressing Towards Goal  Goal: *Lab values improving  Outcome: Progressing Towards Goal     Problem: Acute Renal Failure: Day 4  Goal: Activity/Safety  Outcome: Progressing Towards Goal  Goal: Consults, if ordered  Outcome: Progressing Towards Goal  Goal: Diagnostic Test/Procedures  Outcome: Progressing Towards Goal  Goal: Nutrition/Diet  Outcome: Progressing Towards Goal  Goal: Discharge Planning  Outcome: Progressing Towards Goal  Goal: Medications  Outcome: Progressing Towards Goal  Goal: Respiratory  Outcome: Progressing Towards Goal  Goal: Treatments/Interventions/Procedures  Outcome: Progressing Towards Goal  Goal: Psychosocial  Outcome: Progressing Towards Goal  Goal: *Optimal pain control at patient's stated goal  Outcome: Progressing Towards Goal  Goal: *Urinary output within identified parameters  Outcome: Progressing Towards Goal  Goal: *Hemodynamically stable  Outcome: Progressing Towards Goal  Goal: *Tolerating diet  Outcome: Progressing Towards Goal  Goal: *Lab values improving  Outcome: Progressing Towards Goal     Problem: Acute Renal Failure: Day 5  Goal: Activity/Safety  Outcome: Progressing Towards Goal  Goal: Diagnostic Test/Procedures  Outcome: Progressing Towards Goal  Goal: Nutrition/Diet  Outcome: Progressing Towards Goal  Goal: Discharge Planning  Outcome: Progressing Towards Goal  Goal: Medications  Outcome: Progressing Towards Goal  Goal: Respiratory  Outcome: Progressing Towards Goal  Goal: Treatments/Interventions/Procedures  Outcome: Progressing Towards Goal  Goal: Psychosocial  Outcome: Progressing Towards Goal  Goal: *Optimal pain control at patient's stated goal  Outcome: Progressing Towards Goal  Goal: *Urinary output within identified parameters  Outcome: Progressing Towards Goal  Goal: *Hemodynamically stable  Outcome: Progressing Towards Goal  Goal: *Tolerating diet  Outcome: Progressing Towards Goal  Goal: *Lab values improving  Outcome: Progressing Towards Goal     Problem: Acute Renal Failure: Day 6  Goal: Activity/Safety  Outcome: Progressing Towards Goal  Goal: Diagnostic Test/Procedures  Outcome: Progressing Towards Goal  Goal: Nutrition/Diet  Outcome: Progressing Towards Goal  Goal: Discharge Planning  Outcome: Progressing Towards Goal  Goal: Medications  Outcome: Progressing Towards Goal  Goal: Respiratory  Outcome: Progressing Towards Goal  Goal: Treatments/Interventions/Procedures  Outcome: Progressing Towards Goal  Goal: Psychosocial  Outcome: Progressing Towards Goal     Problem: Acute Renal Failure: Discharge Outcomes  Goal: *Optimal pain control at patient's stated goal  Outcome: Progressing Towards Goal  Goal: *Urinary output within identified parameters  Outcome: Progressing Towards Goal  Goal: *Hemodynamically stable  Outcome: Progressing Towards Goal  Goal: *Tolerating diet  Outcome: Progressing Towards Goal  Goal: *Lab values stabilized  Outcome: Progressing Towards Goal  Goal: *Verbalizes understanding and describes medication purposes and frequencies  Outcome: Progressing Towards Goal  Goal: *Medication reconciliation  Outcome: Progressing Towards Goal

## 2019-07-18 NOTE — PROGRESS NOTES
[facility] Hospitalist Group  Progress Note    Patient: Des Flanagan Age: 46 y.o. : 1965 MR#: [id number] SSN: [id number]  Date/Time: 2019     Subjective:     Lying in bed , feels much better         Assessment/Plan:     1. ARF - resolving , stopped IVF - hard to get IV access   2. C diff + --- PO Vanco   3. UTI - Not UTI per my discussion with ID   4. Hypotension resolved   5. Anemia - monitor H&H - will transfuse for Hgb <7.0   6. H/o Alcohol abuse - Ativan prn   7. Homeless - CM on board   DVT Px - Heparin   FC     Anticipate discharge in AM       Case discussed with:  [x]Patient  []Family  [x]Nursing  []Case Management  DVT Prophylaxis:  []Lovenox  [x]Hep SQ  []SCDs  []Coumadin   []On Heparin gtt    Objective:   VS:   Visit Vitals  /75 (BP 1 Location: Right arm, BP Patient Position: At rest)   Pulse 80   Temp 98.3 °F (36.8 °C)   Resp 21   Ht 5' 1\" (1.549 m)   Wt 82 kg (180 lb 12.4 oz)   SpO2 99%   Breastfeeding? No   BMI 34.16 kg/m²      Tmax/24hrs: Temp (24hrs), Av.2 °F (36.8 °C), Min:97.8 °F (36.6 °C), Max:98.4 °F (36.9 °C)  IOBRIEF    Intake/Output Summary (Last 24 hours) at 2019 1411  Last data filed at 2019 1002  Gross per 24 hour   Intake 2080 ml   Output 1800 ml   Net 280 ml       General:  Alert, cooperative, no acute distress    HEENT: PERRLA, anicteric sclerae. Pulmonary:  CTA Bilaterally. No Wheezing/Rhonchi/Rales. Cardiovascular: Regular rate and Rhythm. GI:  Soft, Non distended, Non tender. + Bowel sounds. Extremities:  No edema, cyanosis, clubbing. No calf tenderness. Neurologic: Alert and oriented X 3. No acute neuro deficits.   Additional:    Medications:   Current Facility-Administered Medications   Medication Dose Route Frequency    vancomycin (FIRVANQ) 50 mg/mL oral solution 125 mg  125 mg Oral Q6H    heparin (porcine) injection 5,000 Units  5,000 Units SubCUTAneous Q8H    0.9% sodium chloride infusion  60 mL/hr IntraVENous CONTINUOUS    amino acids/multivit with iron (CHAPIS-RECOVER) capsule 2 Cap  2 Cap Oral TID WITH MEALS    thiamine (B-1) 250 mg in 0.9% sodium chloride 50 mL IVPB  250 mg IntraVENous DAILY    sodium chloride (NS) flush 5-40 mL  5-40 mL IntraVENous PRN    HYDROmorphone (DILAUDID) injection 1 mg  1 mg IntraVENous Q3H PRN    naloxone (NARCAN) injection 0.4 mg  0.4 mg IntraVENous EVERY 2 MINUTES AS NEEDED       Labs:    Recent Results (from the past 24 hour(s))   MAGNESIUM    Collection Time: 07/17/19  4:04 PM   Result Value Ref Range    Magnesium 1.5 (L) 1.6 - 2.6 mg/dL   PHOSPHORUS    Collection Time: 07/17/19  4:04 PM   Result Value Ref Range    Phosphorus 2.6 2.5 - 4.9 MG/DL   METABOLIC PANEL, BASIC    Collection Time: 07/17/19  4:04 PM   Result Value Ref Range    Sodium 138 136 - 145 mmol/L    Potassium 4.2 3.5 - 5.5 mmol/L    Chloride 109 (H) 100 - 108 mmol/L    CO2 24 21 - 32 mmol/L    Anion gap 5 3.0 - 18 mmol/L    Glucose 102 (H) 74 - 99 mg/dL    BUN 29 (H) 7.0 - 18 MG/DL    Creatinine 2.21 (H) 0.6 - 1.3 MG/DL    BUN/Creatinine ratio 13 12 - 20      GFR est AA 28 (L) >60 ml/min/1.73m2    GFR est non-AA 23 (L) >60 ml/min/1.73m2    Calcium 8.4 (L) 8.5 - 10.1 MG/DL   MAGNESIUM    Collection Time: 07/17/19  7:57 PM   Result Value Ref Range    Magnesium 1.5 (L) 1.6 - 2.6 mg/dL   PHOSPHORUS    Collection Time: 07/17/19  7:57 PM   Result Value Ref Range    Phosphorus 2.4 (L) 2.5 - 4.9 MG/DL   METABOLIC PANEL, BASIC    Collection Time: 07/17/19  7:57 PM   Result Value Ref Range    Sodium 139 136 - 145 mmol/L    Potassium 4.3 3.5 - 5.5 mmol/L    Chloride 110 (H) 100 - 108 mmol/L    CO2 24 21 - 32 mmol/L    Anion gap 5 3.0 - 18 mmol/L    Glucose 113 (H) 74 - 99 mg/dL    BUN 27 (H) 7.0 - 18 MG/DL    Creatinine 2.21 (H) 0.6 - 1.3 MG/DL    BUN/Creatinine ratio 12 12 - 20      GFR est AA 28 (L) >60 ml/min/1.73m2    GFR est non-AA 23 (L) >60 ml/min/1.73m2    Calcium 8.1 (L) 8.5 - 10.1 MG/DL   CBC WITH AUTOMATED DIFF Collection Time: 07/18/19  3:23 AM   Result Value Ref Range    WBC 3.5 (L) 4.6 - 13.2 K/uL    RBC 1.96 (L) 4.20 - 5.30 M/uL    HGB 7.0 (L) 12.0 - 16.0 g/dL    HCT 20.6 (L) 35.0 - 45.0 %    .1 (H) 74.0 - 97.0 FL    MCH 35.7 (H) 24.0 - 34.0 PG    MCHC 34.0 31.0 - 37.0 g/dL    RDW 14.1 11.6 - 14.5 %    PLATELET 835 068 - 137 K/uL    MPV 9.5 9.2 - 11.8 FL    NEUTROPHILS 53 40 - 73 %    LYMPHOCYTES 34 21 - 52 %    MONOCYTES 9 3 - 10 %    EOSINOPHILS 3 0 - 5 %    BASOPHILS 1 0 - 2 %    ABS. NEUTROPHILS 1.9 1.8 - 8.0 K/UL    ABS. LYMPHOCYTES 1.2 0.9 - 3.6 K/UL    ABS. MONOCYTES 0.3 0.05 - 1.2 K/UL    ABS. EOSINOPHILS 0.1 0.0 - 0.4 K/UL    ABS.  BASOPHILS 0.0 0.0 - 0.1 K/UL    DF AUTOMATED     METABOLIC PANEL, BASIC    Collection Time: 07/18/19  3:23 AM   Result Value Ref Range    Sodium 139 136 - 145 mmol/L    Potassium 4.2 3.5 - 5.5 mmol/L    Chloride 110 100 - 111 mmol/L    CO2 23 21 - 32 mmol/L    Anion gap 6 3.0 - 18 mmol/L    Glucose 94 74 - 99 mg/dL    BUN 24 (H) 7.0 - 18 MG/DL    Creatinine 2.06 (H) 0.6 - 1.3 MG/DL    BUN/Creatinine ratio 12 12 - 20      GFR est AA 30 (L) >60 ml/min/1.73m2    GFR est non-AA 25 (L) >60 ml/min/1.73m2    Calcium 8.0 (L) 8.5 - 10.1 MG/DL   MAGNESIUM    Collection Time: 07/18/19  3:23 AM   Result Value Ref Range    Magnesium 1.6 1.6 - 2.6 mg/dL   PHOSPHORUS    Collection Time: 07/18/19  3:23 AM   Result Value Ref Range    Phosphorus 2.7 2.5 - 4.9 MG/DL   GLUCOSE, POC    Collection Time: 07/18/19  7:47 AM   Result Value Ref Range    Glucose (POC) 100 70 - 110 mg/dL       Signed By: Timur Gong MD     July 18, 2019

## 2019-07-19 VITALS
TEMPERATURE: 98.2 F | DIASTOLIC BLOOD PRESSURE: 87 MMHG | SYSTOLIC BLOOD PRESSURE: 151 MMHG | HEIGHT: 61 IN | BODY MASS INDEX: 34.13 KG/M2 | OXYGEN SATURATION: 100 % | HEART RATE: 80 BPM | WEIGHT: 180.78 LBS | RESPIRATION RATE: 20 BRPM

## 2019-07-19 LAB
BACTERIA SPEC CULT: ABNORMAL
SERVICE CMNT-IMP: ABNORMAL

## 2019-07-19 PROCEDURE — 74011250637 HC RX REV CODE- 250/637: Performed by: HOSPITALIST

## 2019-07-19 PROCEDURE — 94762 N-INVAS EAR/PLS OXIMTRY CONT: CPT

## 2019-07-19 RX ORDER — VANCOMYCIN HYDROCHLORIDE 250 MG/5ML
125 POWDER, FOR SOLUTION ORAL EVERY 6 HOURS
Qty: 100 ML | Refills: 0 | Status: SHIPPED | OUTPATIENT
Start: 2019-07-19 | End: 2019-07-29

## 2019-07-19 RX ORDER — HYDROCHLOROTHIAZIDE 25 MG/1
25 TABLET ORAL DAILY
Qty: 30 TAB | Refills: 0 | Status: SHIPPED | OUTPATIENT
Start: 2019-07-19

## 2019-07-19 RX ADMIN — Medication 2 CAPSULE: at 12:38

## 2019-07-19 RX ADMIN — VANCOMYCIN HYDROCHLORIDE 125 MG: KIT at 05:56

## 2019-07-19 RX ADMIN — VANCOMYCIN HYDROCHLORIDE 125 MG: KIT at 12:39

## 2019-07-19 RX ADMIN — Medication 2 CAPSULE: at 08:41

## 2019-07-19 NOTE — DISCHARGE INSTRUCTIONS
Patient Education        Dehydration: Care Instructions  Your Care Instructions  Dehydration happens when your body loses too much fluid. This might happen when you do not drink enough water or you lose large amounts of fluids from your body because of diarrhea, vomiting, or sweating. Severe dehydration can be life-threatening. Water and minerals called electrolytes help put your body fluids back in balance. Learn the early signs of fluid loss, and drink more fluids to prevent dehydration. Follow-up care is a key part of your treatment and safety. Be sure to make and go to all appointments, and call your doctor if you are having problems. It's also a good idea to know your test results and keep a list of the medicines you take. How can you care for yourself at home? · To prevent dehydration, drink plenty of fluids, enough so that your urine is light yellow or clear like water. Choose water and other caffeine-free clear liquids until you feel better. If you have kidney, heart, or liver disease and have to limit fluids, talk with your doctor before you increase the amount of fluids you drink. · If you do not feel like eating or drinking, try taking small sips of water, sports drinks, or other rehydration drinks. · Get plenty of rest.  To prevent dehydration  · Add more fluids to your diet and daily routine, unless your doctor has told you not to. · During hot weather, drink more fluids. Drink even more fluids if you exercise a lot. Stay away from drinks with alcohol or caffeine. · Watch for the symptoms of dehydration. These include:  ? A dry, sticky mouth. ? Dark yellow urine, and not much of it. ? Dry and sunken eyes. ? Feeling very tired. · Learn what problems can lead to dehydration. These include:  ? Diarrhea, fever, and vomiting. ? Any illness with a fever, such as pneumonia or the flu. ?  Activities that cause heavy sweating, such as endurance races and heavy outdoor work in hot or humid weather. ? Alcohol or drug abuse or withdrawal.  ? Certain medicines, such as cold and allergy pills (antihistamines), diet pills (diuretics), and laxatives. ? Certain diseases, such as diabetes, cancer, and heart or kidney disease. When should you call for help? Call 911 anytime you think you may need emergency care. For example, call if:    · You passed out (lost consciousness).    Call your doctor now or seek immediate medical care if:    · You are confused and cannot think clearly.     · You are dizzy or lightheaded, or you feel like you may faint.     · You have signs of needing more fluids. You have sunken eyes and a dry mouth, and you pass only a little dark urine.     · You cannot keep fluids down.    Watch closely for changes in your health, and be sure to contact your doctor if:    · You are not making tears.     · Your skin is very dry and sags slowly back into place after you pinch it.     · Your mouth and eyes are very dry. Where can you learn more? Go to http://roxy-britta.info/. Enter J027 in the search box to learn more about \"Dehydration: Care Instructions. \"  Current as of: September 23, 2018  Content Version: 11.9  © 6898-5644 [employer]. Care instructions adapted under license by [employer] (which disclaims liability or warranty for this information). If you have questions about a medical condition or this instruction, always ask your healthcare professional. [place] [address] any warranty or liability for your use of this information. Patient Education        Acute Kidney Injury: Care Instructions  Your Care Instructions    Acute kidney injury (SUYAPA) is a sudden decrease in kidney function. This can happen over a period of hours, days or, in some cases, weeks. SUYAPA used to be called acute renal failure, but kidney failure doesn't always happen with SUYAPA.  Common causes of SUYAPA are dehydration, blood loss, and medicines. When SUYAPA happens, the kidneys have trouble removing waste and excess fluids from the body. The waste and fluids build up and become harmful. Kidney function may return to normal if the cause of SUYAPA is treated quickly. Your chance of a full recovery depends on what caused the problem, how quickly the cause was treated, and what other medical problems you have. A machine may be used to help your kidneys remove waste and fluids for a short period of time. This is called dialysis. Follow-up care is a key part of your treatment and safety. Be sure to make and go to all appointments, and call your doctor if you are having problems. It's also a good idea to know your test results and keep a list of the medicines you take. How can you care for yourself at home? · Talk to your doctor about how much fluid you should drink. · Eat a balanced diet. Talk to your doctor or a dietitian about what type of diet may be best for you. You may need to limit sodium, potassium, and phosphorus. · If you need dialysis, follow the instructions and schedule for dialysis that your doctor gives you. · Do not smoke. Smoking can make your condition worse. If you need help quitting, talk to your doctor about stop-smoking programs and medicines. These can increase your chances of quitting for good. · Do not drink alcohol. · Review all of your medicines with your doctor. Do not take any medicines, including nonsteroidal anti-inflammatory drugs (NSAIDs) such as ibuprofen (Advil, Motrin) or naproxen (Aleve), unless your doctor says it is safe for you to do so. · Make sure that anyone treating you for any health problem knows that you have had SUYAPA. When should you call for help? Call 911 anytime you think you may need emergency care.  For example, call if:    · You passed out (lost consciousness).    Call your doctor now or seek immediate medical care if:    · You have new or worse nausea and vomiting.     · You have much less urine than normal, or you have no urine.     · You are feeling confused or cannot think clearly.     · You have new or more blood in your urine.     · You have new swelling.     · You are dizzy or lightheaded, or feel like you may faint.    Watch closely for changes in your health, and be sure to contact your doctor if:    · You do not get better as expected. Where can you learn more? Go to http://roxy-britta.info/. Enter U029 in the search box to learn more about \"Acute Kidney Injury: Care Instructions. \"  Current as of: March 14, 2018  Content Version: 11.9  © 1816-3916 [employer]. Care instructions adapted under license by [employer] (which disclaims liability or warranty for this information). If you have questions about a medical condition or this instruction, always ask your healthcare professional. [address] any warranty or liability for your use of this information. Patient Education        Oral Rehydration: Care Instructions  Your Care Instructions    Dehydration occurs when your body loses too much water. This can happen if you do not drink enough fluids or lose a lot of fluid due to diarrhea, vomiting, or sweating. Being dehydrated can cause health problems and can even be life-threatening. To replace lost fluids, you need to drink liquid that contains special chemicals called electrolytes. Electrolytes keep your body working well. Plain water does not have electrolytes. You also need to rest to prevent more fluid loss. Replacing water and electrolytes (oral rehydration) completely takes about 36 hours. But you should feel better within a few hours. Follow-up care is a key part of your treatment and safety. Be sure to make and go to all appointments, and call your doctor if you are having problems. It's also a good idea to know your test results and keep a list of the medicines you take.   How can you care for yourself at home?  · Take frequent sips of a drink such as Gatorade, Powerade, or other rehydration drinks that your doctor suggests. These replace both fluid and important chemicals (electrolytes) you need for balance in your blood. · Drink 2 quarts of cool liquid over 2 to 4 hours. You should have at least 10 glasses of liquid a day to replace lost fluid. If you have kidney, heart, or liver disease and have to limit fluids, talk with your doctor before you increase the amount of fluids you drink. · Make your own drink. Measure everything carefully. The drink may not work well or may even be harmful if the amounts are off. ? 1 quart water  ? ½ teaspoon salt  ? 6 teaspoons sugar  · Do not drink liquid with caffeine, such as coffee and abimael. · Do not drink any alcohol. It can make you dehydrated. · Drink plenty of fluids, enough so that your urine is light yellow or clear like water. If you have kidney, heart, or liver disease and have to limit fluids, talk with your doctor before you increase the amount of fluids you drink. When should you call for help? Call 911 anytime you think you may need emergency care. For example, call if:    · You have signs of severe dehydration, such as:  ? You are confused or unable to stay awake.  ? You passed out (lost consciousness).    Call your doctor now or seek immediate medical care if:    · You still have signs of dehydration. You have sunken eyes and a dry mouth, and you pass only a little dark urine.     · You are dizzy or lightheaded, or you feel like you may faint.     · You are not able to keep down fluids.    Watch closely for changes in your health, and be sure to contact your doctor if:    · You do not get better as expected. Where can you learn more? Go to http://roxy-britta.info/. Enter I040 in the search box to learn more about \"Oral Rehydration: Care Instructions. \"  Current as of: September 23, 2018  Content Version: 11.9  © 1597-0118 [employer]. Care instructions adapted under license by [employer] (which disclaims liability or warranty for this information). If you have questions about a medical condition or this instruction, always ask your healthcare professional. [address] any warranty or liability for your use of this information.

## 2019-07-19 NOTE — PROGRESS NOTES
Discharge Planning  Pt continues to report that she is homeless, this pt has been banded from the [address]. Called the [employer], [employer] is full. Pt offered the [employer], pt states she prefers to stay in [place], [place]. Pt was provided the number to the Crisis Housing hotline for placement availability. This writer spoke with Ms. [place] from the [employer] [place], who stated that this pt had not applied for SRO program in [place], when this writer explained that this pt could apply for this program, pt only wanted to be supplied the address of where it was located. Pt became upset and cursing, when this writer informed this pt that the hospital does not provide special housing and cannot make programs accept her from the hospital.  Pt begin to curse at this writer. When pt finish this writer inform pt that until she call she is homeless without a referral.  Pt's indigent medications faxed voucher taken to the [facility] pharmacy. Pt has a friend from the streets bring her clothes to her bedside. Requested a LYFT for this pt who refused Medicaid Cab, this writer requested LYFT, which was refused due to pt having Medicaid transport coverage.   Pt was provided a medicaid cab for discharge transport reference #[id number]      [facility]

## 2019-07-19 NOTE — PROGRESS NOTES
Discharge planning    Writer met with patient and discussing medicaid transportation and provided phone numbers for [employer]. Patient stated \" I called the crisis hot line. They haven't called me back. Now, you are giving me more numbers to call! \"  The patient was constantly cursing and decided to talk with someone on the phone. After she got off the phone, stated \" I need a prescription for my blood pressure medication. See if the nurse can get that for me. My friend is going to bring my clothes. I will ask him to take me where I need to go. I don't want a medicaid cab. \"    Writer spoke with Raiza Foster RN about blood pressure medication.  Raiza Foster will call MD.      LATOYA DesaiN, RN  Pager # [phone]  Care Manager

## 2019-07-19 NOTE — DISCHARGE SUMMARY
Discharge Summary    Patient: Uzair Hernandez MRN: [id number]  CSN: [id number]    YOB: 1965  Age: 46 y.o. Sex: female    DOA: 7/15/2019 LOS:  LOS: 4 days   Discharge Date:      Admission Diagnoses: SUYAPA (acute [facility]) ([facility] [N17.9]  Dehydration [E86.0]    Discharge Diagnoses:   Sepsis   Dehydration    Hypotension - likely 2y to dehydration   Hyponatremia   ARF - resolved   C diff colitis   HTN   UTI  Chronic anemia     Discharge Condition: Stable    Consults: Pulmonary/Critical Care    PHYSICAL EXAM  Visit Vitals  /87 (BP 1 Location: Right arm, BP Patient Position: At rest)   Pulse 80   Temp 98.4 °F (36.9 °C)   Resp 20   Ht 5' 1\" (1.549 m)   Wt 82 kg (180 lb 12.4 oz)   SpO2 100%   Breastfeeding? No   BMI 34.16 kg/m²       General: Alert, cooperative, no acute distress    HEENT: PERRLA, EOMI. Anicteric sclerae. Lungs:  CTA Bilaterally. No Wheezing/Rhonchi/Rales. Heart:  Regular rate and Rhythm. Abdomen: Soft, Non distended, Non tender. + Bowel sounds. Extremities: No edema/ cyanosis/ clubbing  Psych:   Good insight. Not anxious or agitated. Neurologic:  AA oriented X 3. Moves all extremities. HPI:  Uzair Hernandez is a 46 y.o. female who states she has been homeless, but accesses a local [facility] center that provides showers and support to obtain work. She usually gets around town with her bike. This has been difficult the past few days due to frequent watery diarrhea. She reports chills and vomiting with diarrhea, sometimes incontinent, for 4 days. This morning she decided to go to [facility] and bought an 8 pack of imodium and took half.  As she drove her bike back to where she rests, she became lightheaded and dizzy and called a friend to get a ride to [facility] ER.     In the ED she was hypotensive 73/53, tachycardic 102  and lactic acid 2.29 and received sepsis bundle with 2274 lactated ringers and a liter of saline when hypotensive a few hours later with  Vancomycin, levaquin and Zosyn 4.5 g.      She was found to be hyponatremic 122 in acute renal failure creatinine 7.05, BUN 61, GFR 6, with anemia Hgb 10.3, Hct 29.1. Nephrology was consulted in the ED and adjusted fluids for slow Na+ correction: to 1/2 NSS at 80 cc/hr. Renal US in am.           Hospital Course:   Pt admitted to the hosp with Sepsis, ARF , hyponatremia , dehydration, UTI - she mentioned that she had diarrhea & was feeling fatigued - was noted to be C diff + -- started on IV Flagyl & switched to PO Vanco   Also recd IVF - doing much better now - ARF resolved   Diarrhea is better now   She mentions she is homeless - CM consulted & involved with discharge planning   Pt given scripts for Vanco & HCTZ   Advised to f/u with PCP in 2 weeks     Imaging:  US Abd   IMPRESSION  IMPRESSION:        1. Hepatosteatosis.     2. No gallstones.      3. No acute abnormalities in the adequately visualized portions of the abdomen. Discharge Medications:     Current Discharge Medication List      START taking these medications    Details   vancomycin (FIRVANQ) 50 mg/mL oral solution Take 2.5 mL by mouth every six (6) hours for 10 days. Qty: 100 mL, Refills: 0         CONTINUE these medications which have NOT CHANGED    Details   traZODone (DESYREL) 100 mg tablet Take 1 Tab by mouth nightly. Qty: 30 Tab, Refills: 1      hydroCHLOROthiazide (HYDRODIURIL) 25 mg tablet Take 1 Tab by mouth daily. Indications: Edema, hypertension  Qty: 30 Tab, Refills: 3    Associated Diagnoses: Essential hypertension; Leg swelling      omega-3 acid ethyl esters (LOVAZA) 1 gram capsule Take 2 Caps by mouth two (2) times a day. Qty: 360 Cap, Refills: 3    Associated Diagnoses: Hypertriglyceridemia      multivitamin (ONE A DAY) tablet Take 1 Tab by mouth daily.     Associated Diagnoses: ETOH abuse             Current Facility-Administered Medications:     vancomycin (FIRVANQ) 50 mg/mL oral solution 125 mg, 125 mg, Oral, Q6H, Yariel Mccullough MD, 125 mg at 07/19/19 0556    heparin (porcine) injection 5,000 Units, 5,000 Units, SubCUTAneous, Q8H, Phi GODWIN NP, 5,000 Units at 07/17/19 5490    amino acids/multivit with iron (CHAPIS-RECOVER) capsule 2 Cap, 2 Cap, Oral, TID WITH MEALS, Delfin Lobo DO, 2 Cap at 07/19/19 0841    thiamine (B-1) 250 mg in 0.9% sodium chloride 50 mL IVPB, 250 mg, IntraVENous, DAILY, Delfin Lobo DO    sodium chloride (NS) flush 5-40 mL, 5-40 mL, IntraVENous, PRN, Eleazar ZARATE,     HYDROmorphone (DILAUDID) injection 1 mg, 1 mg, IntraVENous, Q3H PRN, Delfin oLbo DO    naloxone [facility]) injection 0.4 mg, 0.4 mg, IntraVENous, EVERY 2 MINUTES AS NEEDED, Eleazar ZARATE DO  · It is important that you take the medication exactly as they are prescribed. · Keep your medication in the bottles provided by the pharmacist and keep a list of the medication names, dosages, and times to be taken in your wallet. · Do not take other medications without consulting your doctor.          Minutes spent on discharge: ** minutes spent coordinating this discharge (review instructions/follow-up, prescriptions, preparing report for sign off)    Monica Faye MD  7/19/2019 11:09 AM

## 2019-07-21 LAB
BACTERIA SPEC CULT: NORMAL
BACTERIA SPEC CULT: NORMAL
SERVICE CMNT-IMP: NORMAL
SERVICE CMNT-IMP: NORMAL

## 2019-07-22 LAB — HFE GENE MUT ANL BLD/T: NORMAL

## 2019-07-23 LAB
O+P SPEC MICRO: NORMAL
O+P STL CONC: NORMAL
SPECIMEN SOURCE: NORMAL

## 2019-07-26 LAB — TRYPSIN SERPL-MCNC: 845 NG/ML (ref 169–773)

## 2019-08-19 ENCOUNTER — HOSPITAL ENCOUNTER (EMERGENCY)
Age: 54
Discharge: HOME | End: 2019-08-20
Attending: EMERGENCY MEDICINE
Payer: MEDICAID

## 2019-08-19 ENCOUNTER — APPOINTMENT (OUTPATIENT)
Dept: GENERAL RADIOLOGY | Age: 54
End: 2019-08-19
Attending: PHYSICIAN ASSISTANT
Payer: MEDICAID

## 2019-08-19 VITALS
SYSTOLIC BLOOD PRESSURE: 108 MMHG | OXYGEN SATURATION: 99 % | TEMPERATURE: 98.1 F | HEART RATE: 100 BPM | RESPIRATION RATE: 18 BRPM | DIASTOLIC BLOOD PRESSURE: 70 MMHG

## 2019-08-19 DIAGNOSIS — E87.6 HYPOKALEMIA: ICD-10-CM

## 2019-08-19 DIAGNOSIS — G89.29 CHRONIC LOW BACK PAIN WITHOUT SCIATICA, UNSPECIFIED BACK PAIN LATERALITY: ICD-10-CM

## 2019-08-19 DIAGNOSIS — N28.9 RENAL INSUFFICIENCY: Primary | ICD-10-CM

## 2019-08-19 DIAGNOSIS — E86.0 DEHYDRATION: ICD-10-CM

## 2019-08-19 DIAGNOSIS — M54.50 CHRONIC LOW BACK PAIN WITHOUT SCIATICA, UNSPECIFIED BACK PAIN LATERALITY: ICD-10-CM

## 2019-08-19 LAB
ALBUMIN SERPL-MCNC: 4.6 G/DL (ref 3.4–5)
ALBUMIN/GLOB SERPL: 1 {RATIO} (ref 0.8–1.7)
ALP SERPL-CCNC: 140 U/L (ref 45–117)
ALT SERPL-CCNC: 51 U/L (ref 13–56)
ANION GAP SERPL CALC-SCNC: 14 MMOL/L (ref 3–18)
ANION GAP SERPL CALC-SCNC: 9 MMOL/L (ref 3–18)
APPEARANCE UR: ABNORMAL
AST SERPL-CCNC: 81 U/L (ref 10–38)
BACTERIA URNS QL MICRO: ABNORMAL /HPF
BASOPHILS # BLD: 0 K/UL (ref 0–0.1)
BASOPHILS NFR BLD: 0 % (ref 0–2)
BILIRUB SERPL-MCNC: 1.6 MG/DL (ref 0.2–1)
BILIRUB UR QL: NEGATIVE
BUN SERPL-MCNC: 20 MG/DL (ref 7–18)
BUN SERPL-MCNC: 22 MG/DL (ref 7–18)
BUN/CREAT SERPL: 7 (ref 12–20)
BUN/CREAT SERPL: 8 (ref 12–20)
CALCIUM SERPL-MCNC: 7.8 MG/DL (ref 8.5–10.1)
CALCIUM SERPL-MCNC: 8.7 MG/DL (ref 8.5–10.1)
CHLORIDE SERPL-SCNC: 92 MMOL/L (ref 100–111)
CHLORIDE SERPL-SCNC: 99 MMOL/L (ref 100–111)
CO2 SERPL-SCNC: 25 MMOL/L (ref 21–32)
CO2 SERPL-SCNC: 26 MMOL/L (ref 21–32)
COLOR UR: YELLOW
CREAT SERPL-MCNC: 2.5 MG/DL (ref 0.6–1.3)
CREAT SERPL-MCNC: 3.2 MG/DL (ref 0.6–1.3)
DIFFERENTIAL METHOD BLD: ABNORMAL
EOSINOPHIL # BLD: 0 K/UL (ref 0–0.4)
EOSINOPHIL NFR BLD: 0 % (ref 0–5)
EPITH CASTS URNS QL MICRO: ABNORMAL /LPF (ref 0–5)
ERYTHROCYTE [DISTWIDTH] IN BLOOD BY AUTOMATED COUNT: 14.5 % (ref 11.6–14.5)
GLOBULIN SER CALC-MCNC: 4.4 G/DL (ref 2–4)
GLUCOSE SERPL-MCNC: 109 MG/DL (ref 74–99)
GLUCOSE SERPL-MCNC: 113 MG/DL (ref 74–99)
GLUCOSE UR STRIP.AUTO-MCNC: NEGATIVE MG/DL
HCG SERPL QL: NEGATIVE
HCT VFR BLD AUTO: 37.1 % (ref 35–45)
HGB BLD-MCNC: 12.8 G/DL (ref 12–16)
HGB UR QL STRIP: ABNORMAL
KETONES UR QL STRIP.AUTO: NEGATIVE MG/DL
LEUKOCYTE ESTERASE UR QL STRIP.AUTO: NEGATIVE
LYMPHOCYTES # BLD: 1.2 K/UL (ref 0.9–3.6)
LYMPHOCYTES NFR BLD: 9 % (ref 21–52)
MAGNESIUM SERPL-MCNC: 1.5 MG/DL (ref 1.6–2.6)
MCH RBC QN AUTO: 37.5 PG (ref 24–34)
MCHC RBC AUTO-ENTMCNC: 34.5 G/DL (ref 31–37)
MCV RBC AUTO: 108.8 FL (ref 74–97)
MONOCYTES # BLD: 0.7 K/UL (ref 0.05–1.2)
MONOCYTES NFR BLD: 6 % (ref 3–10)
NEUTS SEG # BLD: 10.5 K/UL (ref 1.8–8)
NEUTS SEG NFR BLD: 85 % (ref 40–73)
NITRITE UR QL STRIP.AUTO: NEGATIVE
PH UR STRIP: 6.5 [PH] (ref 5–8)
PLATELET # BLD AUTO: 343 K/UL (ref 135–420)
PMV BLD AUTO: 10.1 FL (ref 9.2–11.8)
POTASSIUM SERPL-SCNC: 3 MMOL/L (ref 3.5–5.5)
POTASSIUM SERPL-SCNC: 3.2 MMOL/L (ref 3.5–5.5)
PROT SERPL-MCNC: 9 G/DL (ref 6.4–8.2)
PROT UR STRIP-MCNC: NEGATIVE MG/DL
RBC # BLD AUTO: 3.41 M/UL (ref 4.2–5.3)
RBC #/AREA URNS HPF: ABNORMAL /HPF (ref 0–5)
SODIUM SERPL-SCNC: 131 MMOL/L (ref 136–145)
SODIUM SERPL-SCNC: 134 MMOL/L (ref 136–145)
SP GR UR REFRACTOMETRY: 1 (ref 1–1.03)
UROBILINOGEN UR QL STRIP.AUTO: 0.2 EU/DL (ref 0.2–1)
WBC # BLD AUTO: 12.4 K/UL (ref 4.6–13.2)
WBC URNS QL MICRO: ABNORMAL /HPF (ref 0–4)

## 2019-08-19 PROCEDURE — 81001 URINALYSIS AUTO W/SCOPE: CPT

## 2019-08-19 PROCEDURE — 83735 ASSAY OF MAGNESIUM: CPT

## 2019-08-19 PROCEDURE — 74011250636 HC RX REV CODE- 250/636: Performed by: PHYSICIAN ASSISTANT

## 2019-08-19 PROCEDURE — 96365 THER/PROPH/DIAG IV INF INIT: CPT

## 2019-08-19 PROCEDURE — 72100 X-RAY EXAM L-S SPINE 2/3 VWS: CPT

## 2019-08-19 PROCEDURE — 96375 TX/PRO/DX INJ NEW DRUG ADDON: CPT

## 2019-08-19 PROCEDURE — 80053 COMPREHEN METABOLIC PANEL: CPT

## 2019-08-19 PROCEDURE — 99285 EMERGENCY DEPT VISIT HI MDM: CPT

## 2019-08-19 PROCEDURE — 96361 HYDRATE IV INFUSION ADD-ON: CPT

## 2019-08-19 PROCEDURE — 74011250636 HC RX REV CODE- 250/636: Performed by: NURSE PRACTITIONER

## 2019-08-19 PROCEDURE — 96366 THER/PROPH/DIAG IV INF ADDON: CPT

## 2019-08-19 PROCEDURE — 85025 COMPLETE CBC W/AUTO DIFF WBC: CPT

## 2019-08-19 PROCEDURE — 84703 CHORIONIC GONADOTROPIN ASSAY: CPT

## 2019-08-19 RX ORDER — SODIUM CHLORIDE 9 MG/ML
2000 INJECTION, SOLUTION INTRAVENOUS ONCE
Status: COMPLETED | OUTPATIENT
Start: 2019-08-19 | End: 2019-08-19

## 2019-08-19 RX ORDER — POTASSIUM CHLORIDE 7.45 MG/ML
10 INJECTION INTRAVENOUS
Status: COMPLETED | OUTPATIENT
Start: 2019-08-19 | End: 2019-08-20

## 2019-08-19 RX ORDER — POTASSIUM CHLORIDE 20 MEQ/1
40 TABLET, EXTENDED RELEASE ORAL
Status: DISCONTINUED | OUTPATIENT
Start: 2019-08-19 | End: 2019-08-20 | Stop reason: HOSPADM

## 2019-08-19 RX ORDER — LORAZEPAM 2 MG/ML
1 INJECTION INTRAMUSCULAR
Status: COMPLETED | OUTPATIENT
Start: 2019-08-19 | End: 2019-08-19

## 2019-08-19 RX ORDER — MAGNESIUM SULFATE HEPTAHYDRATE 40 MG/ML
2 INJECTION, SOLUTION INTRAVENOUS ONCE
Status: COMPLETED | OUTPATIENT
Start: 2019-08-19 | End: 2019-08-19

## 2019-08-19 RX ORDER — ONDANSETRON 2 MG/ML
4 INJECTION INTRAMUSCULAR; INTRAVENOUS ONCE
Status: DISCONTINUED | OUTPATIENT
Start: 2019-08-19 | End: 2019-08-20 | Stop reason: HOSPADM

## 2019-08-19 RX ADMIN — MAGNESIUM SULFATE HEPTAHYDRATE 2 G: 40 INJECTION, SOLUTION INTRAVENOUS at 21:03

## 2019-08-19 RX ADMIN — SODIUM CHLORIDE 2000 ML: 9 INJECTION, SOLUTION INTRAVENOUS at 18:07

## 2019-08-19 RX ADMIN — LORAZEPAM 1 MG: 2 INJECTION, SOLUTION INTRAMUSCULAR; INTRAVENOUS at 21:03

## 2019-08-19 NOTE — ED PROVIDER NOTES
EMERGENCY DEPARTMENT HISTORY AND PHYSICAL EXAM    Date: 8/19/2019  Patient Name: Raffy Chao    History of Presenting Illness     Chief Complaint   Patient presents with    Back Pain         History Provided By: Patient    Chief Complaint: low back pain  Duration:  Weeks  Timing:  Constant  Location: low back  Quality: Aching  Severity: Moderate  Modifying Factors: walking, bending  Associated Symptoms: denies any other associated signs or symptoms      Additional History (Context): Raffy Chao is a 46 y.o. female with acute renal failure, hypertension, hyponatremia, high cholesterol, psychiatric disorder who presents with low back pain. Patient arrives via EMS from her PCP office who called due to patient's persisting complaining of low back pain. Patient's history is a little muddled and that she starts her story off stating that she was in someone's front yard with her belongings given that she is homeless and that owner called the police on her even though she left a note stating that she will be back after her doctor's appointment. She then switches stating that while she was at her doctor's appointment her back was hurting and that she was vomiting and the doctor called the ambulance to take her to the hospital to have her sodium checked because of the vomiting. Patient states the vomiting has not been bothersome and that her back hurts her more. She denies any urinary complaints, back injuries, numbness or tingling or any other symptoms or concerns. Patient states she is homeless and has had poor nutrition. She is currently nauseated.     PCP: Lucinda Mulligan MD    Current Facility-Administered Medications   Medication Dose Route Frequency Provider Last Rate Last Dose    ondansetron (ZOFRAN) injection 4 mg  4 mg IntraVENous ONCE Anabel Ang PA        potassium chloride (K-DUR, KLOR-CON) SR tablet 40 mEq  40 mEq Oral NOW Anabel Ang PA         Current Outpatient Medications   Medication Sig Dispense Refill    potassium chloride SR (K-TAB) 20 mEq tablet Take 1 Tab by mouth two (2) times a day for 7 days. 14 Tab 0    ibuprofen (MOTRIN) 800 mg tablet Take 1 Tab by mouth every six (6) hours as needed for Pain for up to 7 days. 20 Tab 0    hydroCHLOROthiazide (HYDRODIURIL) 25 mg tablet Take 1 Tab by mouth daily. 30 Tab 0    traZODone (DESYREL) 100 mg tablet Take 1 Tab by mouth nightly. 30 Tab 1    omega-3 acid ethyl esters (LOVAZA) 1 gram capsule Take 2 Caps by mouth two (2) times a day. 360 Cap 3    multivitamin (ONE A DAY) tablet Take 1 Tab by mouth daily. Past History     Past Medical History:  Past Medical History:   Diagnosis Date    Acute renal failure (ARF) ([facility] [address] 7/15/2019    Hypertension     Hyponatremia 7/15/2019    Other ill-defined conditions(799.89)     high cholesterol    Psychiatric disorder        Past Surgical History:  No past surgical history on file. Family History:  Family History   Problem Relation Age of Onset   [facility] Stroke Mother     Diabetes Mother     Hypertension Father     Diabetes Father     Cancer Paternal Uncle        Social History:  Social History     Tobacco Use    Smoking status: Never Smoker    Smokeless tobacco: Never Used   Substance Use Topics    Alcohol use: Yes    Drug use: No       Allergies: Allergies   Allergen Reactions    Sulfa (Sulfonamide Antibiotics) Rash         Review of Systems   Review of Systems   Constitutional: Negative for chills and fever. HENT: Negative. Respiratory: Negative. Cardiovascular: Negative. Gastrointestinal: Positive for nausea and vomiting. Genitourinary: Negative. Musculoskeletal: Positive for back pain. All other systems reviewed and are negative.     All Other Systems Negative  Physical Exam     Vitals:    08/19/19 1344 08/19/19 1600   BP: 108/70    Pulse: 100    Resp: 18    Temp: 98.1 °F (36.7 °C)    SpO2: 99% 99%     Physical Exam   Constitutional: She is oriented to person, place, and time. She appears well-developed and well-nourished. No distress. HENT:   Head: Normocephalic and atraumatic. Right Ear: External ear normal.   Left Ear: External ear normal.   Nose: Nose normal.   Mouth/Throat: Oropharynx is clear and moist.   Eyes: Conjunctivae are normal. Right eye exhibits no discharge. Left eye exhibits no discharge. Neck: Normal range of motion. Neck supple. Cardiovascular: Normal rate. Pulmonary/Chest: Effort normal.   Abdominal: Soft. There is no tenderness. Musculoskeletal: Normal range of motion. She exhibits no edema, tenderness or deformity. Lymphadenopathy:     She has no cervical adenopathy. Neurological: She is alert and oriented to person, place, and time. Skin: Skin is warm and dry. No rash noted. She is not diaphoretic. Diagnostic Study Results     Labs -     Recent Results (from the past 12 hour(s))   CBC WITH AUTOMATED DIFF    Collection Time: 08/19/19  4:05 PM   Result Value Ref Range    WBC 12.4 4.6 - 13.2 K/uL    RBC 3.41 (L) 4.20 - 5.30 M/uL    HGB 12.8 12.0 - 16.0 g/dL    HCT 37.1 35.0 - 45.0 %    .8 (H) 74.0 - 97.0 FL    MCH 37.5 (H) 24.0 - 34.0 PG    MCHC 34.5 31.0 - 37.0 g/dL    RDW 14.5 11.6 - 14.5 %    PLATELET 957 917 - 792 K/uL    MPV 10.1 9.2 - 11.8 FL    NEUTROPHILS 85 (H) 40 - 73 %    LYMPHOCYTES 9 (L) 21 - 52 %    MONOCYTES 6 3 - 10 %    EOSINOPHILS 0 0 - 5 %    BASOPHILS 0 0 - 2 %    ABS. NEUTROPHILS 10.5 (H) 1.8 - 8.0 K/UL    ABS. LYMPHOCYTES 1.2 0.9 - 3.6 K/UL    ABS. MONOCYTES 0.7 0.05 - 1.2 K/UL    ABS. EOSINOPHILS 0.0 0.0 - 0.4 K/UL    ABS.  BASOPHILS 0.0 0.0 - 0.1 K/UL    DF AUTOMATED     METABOLIC PANEL, COMPREHENSIVE    Collection Time: 08/19/19  4:05 PM   Result Value Ref Range    Sodium 131 (L) 136 - 145 mmol/L    Potassium 3.0 (L) 3.5 - 5.5 mmol/L    Chloride 92 (L) 100 - 111 mmol/L    CO2 25 21 - 32 mmol/L    Anion gap 14 3.0 - 18 mmol/L    Glucose 113 (H) 74 - 99 mg/dL    BUN 22 (H) 7.0 - 18 MG/DL    Creatinine 3.20 (H) 0.6 - 1.3 MG/DL    BUN/Creatinine ratio 7 (L) 12 - 20      GFR est AA 18 (L) >60 ml/min/1.73m2    GFR est non-AA 15 (L) >60 ml/min/1.73m2    Calcium 8.7 8.5 - 10.1 MG/DL    Bilirubin, total 1.6 (H) 0.2 - 1.0 MG/DL    ALT (SGPT) 51 13 - 56 U/L    AST (SGOT) 81 (H) 10 - 38 U/L    Alk.  phosphatase 140 (H) 45 - 117 U/L    Protein, total 9.0 (H) 6.4 - 8.2 g/dL    Albumin 4.6 3.4 - 5.0 g/dL    Globulin 4.4 (H) 2.0 - 4.0 g/dL    A-G Ratio 1.0 0.8 - 1.7     MAGNESIUM    Collection Time: 08/19/19  4:05 PM   Result Value Ref Range    Magnesium 1.5 (L) 1.6 - 2.6 mg/dL   URINALYSIS W/ RFLX MICROSCOPIC    Collection Time: 08/19/19  8:56 PM   Result Value Ref Range    Color YELLOW      Appearance CLOUDY      Specific gravity 1.005 1.005 - 1.030      pH (UA) 6.5 5.0 - 8.0      Protein NEGATIVE  NEG mg/dL    Glucose NEGATIVE  NEG mg/dL    Ketone NEGATIVE  NEG mg/dL    Bilirubin NEGATIVE  NEG      Blood MODERATE (A) NEG      Urobilinogen 0.2 0.2 - 1.0 EU/dL    Nitrites NEGATIVE  NEG      Leukocyte Esterase NEGATIVE  NEG     URINE MICROSCOPIC ONLY    Collection Time: 08/19/19  8:56 PM   Result Value Ref Range    WBC 0 to 3 0 - 4 /hpf    RBC 0 to 3 0 - 5 /hpf    Epithelial cells 4+ 0 - 5 /lpf    Bacteria 2+ (A) NEG /hpf   HCG QL SERUM    Collection Time: 08/19/19  9:17 PM   Result Value Ref Range    HCG, Ql. NEGATIVE  NEG     METABOLIC PANEL, BASIC    Collection Time: 08/19/19  9:17 PM   Result Value Ref Range    Sodium 134 (L) 136 - 145 mmol/L    Potassium 3.2 (L) 3.5 - 5.5 mmol/L    Chloride 99 (L) 100 - 111 mmol/L    CO2 26 21 - 32 mmol/L    Anion gap 9 3.0 - 18 mmol/L    Glucose 109 (H) 74 - 99 mg/dL    BUN 20 (H) 7.0 - 18 MG/DL    Creatinine 2.50 (H) 0.6 - 1.3 MG/DL    BUN/Creatinine ratio 8 (L) 12 - 20      GFR est AA 24 (L) >60 ml/min/1.73m2    GFR est non-AA 20 (L) >60 ml/min/1.73m2    Calcium 7.8 (L) 8.5 - 10.1 MG/DL       Radiologic Studies -   XR SPINE LUMB 2 OR 3 V   Final Result   IMPRESSION:      Degenerative changes as above. CT Results  (Last 48 hours)    None        CXR Results  (Last 48 hours)    None            Medical Decision Making   I am the first provider for this patient. I reviewed the vital signs, available nursing notes, past medical history, past surgical history, family history and social history. Vital Signs-Reviewed the patient's vital signs. Pulse Oximetry Analysis - 99% on RA      Records Reviewed: Nursing Notes    Procedures:  Procedures    Provider Notes (Medical Decision Making):   Ddx: dehydration, electrolyte imbalance,  UTI, pyelonephritis, vs other. Pt c/o low back pain. Sent by PCP for electrolyte check due to vomiting. Afebrile. Non toxic on arrival. Labs soft w/o peritoneal signs. Labs ordered. Hypokalemic, acute on chronic renal failure. 2L NS ordered and the plan was to recheck BMP; however, there is a delay in giving the medication and fluids. Pt turned over at this time to oncoming provider. Angelito Mcwilliams NP 6:00 PM        MED RECONCILIATION:  Current Facility-Administered Medications   Medication Dose Route Frequency    ondansetron (ZOFRAN) injection 4 mg  4 mg IntraVENous ONCE    potassium chloride (K-DUR, KLOR-CON) SR tablet 40 mEq  40 mEq Oral NOW     Current Outpatient Medications   Medication Sig    potassium chloride SR (K-TAB) 20 mEq tablet Take 1 Tab by mouth two (2) times a day for 7 days.  ibuprofen (MOTRIN) 800 mg tablet Take 1 Tab by mouth every six (6) hours as needed for Pain for up to 7 days.  hydroCHLOROthiazide (HYDRODIURIL) 25 mg tablet Take 1 Tab by mouth daily.  traZODone (DESYREL) 100 mg tablet Take 1 Tab by mouth nightly.  omega-3 acid ethyl esters (LOVAZA) 1 gram capsule Take 2 Caps by mouth two (2) times a day.  multivitamin (ONE A DAY) tablet Take 1 Tab by mouth daily. Disposition:  6:05 PM : Pt care transferred to [facility] NP  ,ED provider.  History of patient complaint(s), available diagnostic reports and current treatment plan has been discussed thoroughly. Bedside rounding on patient occured : no . Intended disposition of patient : TBD  Pending diagnostics reports and/or labs (please list): Labs, fluids          Follow-up Information     Follow up With Specialties Details Why Contact Info    Trent Gaines MD [facility] In 2 days  [id number]  [phone]            Current Discharge Medication List      START taking these medications    Details   potassium chloride SR (K-TAB) 20 mEq tablet Take 1 Tab by mouth two (2) times a day for 7 days. Qty: 14 Tab, Refills: 0      ibuprofen (MOTRIN) 800 mg tablet Take 1 Tab by mouth every six (6) hours as needed for Pain for up to 7 days. Qty: 20 Tab, Refills: 0               Diagnosis     Clinical Impression:   1. Renal insufficiency    2. Dehydration    3. Hypokalemia    4. Chronic low back pain without sciatica, unspecified back pain laterality          Progress note:    Patient has been sufficiently hydrated and potassium has been replaced on repeat BMP potassium came up some but she had not had a IV bolus of potassium patient was given an IV bolus of potassium and she was given 2 g of mag for slightly lower magnesium. Patient was never able to provide a stool sample so it is reasonable to think that the diarrhea is not that acute since I have had her for 8-1/2 hours and she has not provided a sample she has not had an episode of diarrhea. Patient states she is homeless. Given her current medical condition I will provide a  consult for them to consult her provide her with community resources. I do not have a justification for admission patient's electrolytes have been replaced dehydration has been corrected. Patient will be discharged to home to follow-up with primary care return to the emergency department if worse. 2:30 AM  Ree Lash  results have been reviewed with her.   She has been counseled regarding her diagnosis, treatment, and plan. She verbally conveys understanding and agreement of the signs, symptoms, diagnosis, treatment and prognosis and additionally agrees to follow up as discussed. She also agrees with the care-plan and conveys that all of her questions have been answered. I have also provided discharge instructions for her that include: educational information regarding their diagnosis and treatment, and list of reasons why they would want to return to the ED prior to their follow-up appointment, should her condition change.     Madhu Isaac ENP-C, FNP-C

## 2019-08-19 NOTE — ED TRIAGE NOTES
Patient arrived via EMS co back pain. Patient reports not sleeping and is homeless. Patient has upcoming dr appt with primary.

## 2019-08-20 VITALS
WEIGHT: 164 LBS | RESPIRATION RATE: 18 BRPM | OXYGEN SATURATION: 99 % | HEART RATE: 84 BPM | HEIGHT: 61 IN | DIASTOLIC BLOOD PRESSURE: 73 MMHG | BODY MASS INDEX: 30.96 KG/M2 | SYSTOLIC BLOOD PRESSURE: 108 MMHG | TEMPERATURE: 97.8 F

## 2019-08-20 DIAGNOSIS — E87.6 HYPOKALEMIA: ICD-10-CM

## 2019-08-20 DIAGNOSIS — N17.9 AKI (ACUTE KIDNEY INJURY) (HCC): ICD-10-CM

## 2019-08-20 DIAGNOSIS — R19.7 DIARRHEA, UNSPECIFIED TYPE: Primary | ICD-10-CM

## 2019-08-20 LAB
ALBUMIN SERPL-MCNC: 3.7 G/DL (ref 3.4–5)
ALBUMIN/GLOB SERPL: 1 {RATIO} (ref 0.8–1.7)
ALP SERPL-CCNC: 108 U/L (ref 45–117)
ALT SERPL-CCNC: 41 U/L (ref 13–56)
ANION GAP SERPL CALC-SCNC: 9 MMOL/L (ref 3–18)
AST SERPL-CCNC: 104 U/L (ref 10–38)
BASOPHILS # BLD: 0 K/UL (ref 0–0.1)
BASOPHILS NFR BLD: 0 % (ref 0–2)
BILIRUB SERPL-MCNC: 1.1 MG/DL (ref 0.2–1)
BUN SERPL-MCNC: 16 MG/DL (ref 7–18)
BUN/CREAT SERPL: 9 (ref 12–20)
C DIFF TOX GENS STL QL NAA+PROBE: NEGATIVE
CALCIUM SERPL-MCNC: 8 MG/DL (ref 8.5–10.1)
CHLORIDE SERPL-SCNC: 102 MMOL/L (ref 100–111)
CO2 SERPL-SCNC: 24 MMOL/L (ref 21–32)
CREAT SERPL-MCNC: 1.86 MG/DL (ref 0.6–1.3)
DIFFERENTIAL METHOD BLD: ABNORMAL
EOSINOPHIL # BLD: 0.1 K/UL (ref 0–0.4)
EOSINOPHIL NFR BLD: 1 % (ref 0–5)
ERYTHROCYTE [DISTWIDTH] IN BLOOD BY AUTOMATED COUNT: 14.2 % (ref 11.6–14.5)
GLOBULIN SER CALC-MCNC: 3.8 G/DL (ref 2–4)
GLUCOSE SERPL-MCNC: 107 MG/DL (ref 74–99)
HCT VFR BLD AUTO: 32.6 % (ref 35–45)
HGB BLD-MCNC: 11.2 G/DL (ref 12–16)
INTERPRETATION: ABNORMAL
LACTATE BLD-SCNC: 1.85 MMOL/L (ref 0.4–2)
LYMPHOCYTES # BLD: 1 K/UL (ref 0.9–3.6)
LYMPHOCYTES NFR BLD: 12 % (ref 21–52)
MCH RBC QN AUTO: 37.7 PG (ref 24–34)
MCHC RBC AUTO-ENTMCNC: 34.4 G/DL (ref 31–37)
MCV RBC AUTO: 109.8 FL (ref 74–97)
MONOCYTES # BLD: 0.4 K/UL (ref 0.05–1.2)
MONOCYTES NFR BLD: 5 % (ref 3–10)
NEUTS SEG # BLD: 6.7 K/UL (ref 1.8–8)
NEUTS SEG NFR BLD: 82 % (ref 40–73)
PCR REFLEX: ABNORMAL
PLATELET # BLD AUTO: 231 K/UL (ref 135–420)
PMV BLD AUTO: 9.4 FL (ref 9.2–11.8)
POTASSIUM SERPL-SCNC: 3.1 MMOL/L (ref 3.5–5.5)
PROT SERPL-MCNC: 7.5 G/DL (ref 6.4–8.2)
RBC # BLD AUTO: 2.97 M/UL (ref 4.2–5.3)
SODIUM SERPL-SCNC: 135 MMOL/L (ref 136–145)
WBC # BLD AUTO: 8.2 K/UL (ref 4.6–13.2)

## 2019-08-20 PROCEDURE — 87449 NOS EACH ORGANISM AG IA: CPT

## 2019-08-20 PROCEDURE — 83605 ASSAY OF LACTIC ACID: CPT

## 2019-08-20 PROCEDURE — 85025 COMPLETE CBC W/AUTO DIFF WBC: CPT

## 2019-08-20 PROCEDURE — 96360 HYDRATION IV INFUSION INIT: CPT

## 2019-08-20 PROCEDURE — 96365 THER/PROPH/DIAG IV INF INIT: CPT

## 2019-08-20 PROCEDURE — 87493 C DIFF AMPLIFIED PROBE: CPT

## 2019-08-20 PROCEDURE — 74011250636 HC RX REV CODE- 250/636: Performed by: EMERGENCY MEDICINE

## 2019-08-20 PROCEDURE — 87177 OVA AND PARASITES SMEARS: CPT

## 2019-08-20 PROCEDURE — 80053 COMPREHEN METABOLIC PANEL: CPT

## 2019-08-20 PROCEDURE — 74011250636 HC RX REV CODE- 250/636: Performed by: NURSE PRACTITIONER

## 2019-08-20 PROCEDURE — 74011250637 HC RX REV CODE- 250/637: Performed by: EMERGENCY MEDICINE

## 2019-08-20 PROCEDURE — 87045 FECES CULTURE AEROBIC BACT: CPT

## 2019-08-20 PROCEDURE — 87329 GIARDIA AG IA: CPT

## 2019-08-20 PROCEDURE — 99283 EMERGENCY DEPT VISIT LOW MDM: CPT

## 2019-08-20 RX ORDER — POTASSIUM CHLORIDE 1500 MG/1
20 TABLET, FILM COATED, EXTENDED RELEASE ORAL 2 TIMES DAILY
Qty: 14 TAB | Refills: 0 | Status: SHIPPED | OUTPATIENT
Start: 2019-08-20 | End: 2019-08-27

## 2019-08-20 RX ORDER — POTASSIUM CHLORIDE 20 MEQ/1
40 TABLET, EXTENDED RELEASE ORAL
Status: COMPLETED | OUTPATIENT
Start: 2019-08-20 | End: 2019-08-20

## 2019-08-20 RX ORDER — SENNOSIDES 25 MG/1
TABLET, FILM COATED ORAL
Qty: 1 TUBE | Refills: 0 | Status: SHIPPED | OUTPATIENT
Start: 2019-08-20 | End: 2019-10-09

## 2019-08-20 RX ORDER — IBUPROFEN 800 MG/1
800 TABLET ORAL
Qty: 20 TAB | Refills: 0 | Status: SHIPPED | OUTPATIENT
Start: 2019-08-20 | End: 2019-08-27

## 2019-08-20 RX ORDER — MAGNESIUM SULFATE HEPTAHYDRATE 40 MG/ML
2 INJECTION, SOLUTION INTRAVENOUS
Status: COMPLETED | OUTPATIENT
Start: 2019-08-20 | End: 2019-08-20

## 2019-08-20 RX ADMIN — POTASSIUM CHLORIDE 10 MEQ: 10 INJECTION, SOLUTION INTRAVENOUS at 00:59

## 2019-08-20 RX ADMIN — POTASSIUM CHLORIDE 40 MEQ: 20 TABLET, EXTENDED RELEASE ORAL at 10:52

## 2019-08-20 RX ADMIN — SODIUM CHLORIDE 1000 ML: 900 INJECTION, SOLUTION INTRAVENOUS at 00:59

## 2019-08-20 RX ADMIN — MAGNESIUM SULFATE HEPTAHYDRATE 2 G: 40 INJECTION, SOLUTION INTRAVENOUS at 10:52

## 2019-08-20 NOTE — DISCHARGE INSTRUCTIONS
Dehydration: Care Instructions  Your Care Instructions  Dehydration happens when your body loses too much fluid. This might happen when you do not drink enough water or you lose large amounts of fluids from your body because of diarrhea, vomiting, or sweating. Severe dehydration can be life-threatening. Water and minerals called electrolytes help put your body fluids back in balance. Learn the early signs of fluid loss, and drink more fluids to prevent dehydration. Follow-up care is a key part of your treatment and safety. Be sure to make and go to all appointments, and call your doctor if you are having problems. It's also a good idea to know your test results and keep a list of the medicines you take. How can you care for yourself at home? · To prevent dehydration, drink plenty of fluids, enough so that your urine is light yellow or clear like water. Choose water and other caffeine-free clear liquids until you feel better. If you have kidney, heart, or liver disease and have to limit fluids, talk with your doctor before you increase the amount of fluids you drink. · If you do not feel like eating or drinking, try taking small sips of water, sports drinks, or other rehydration drinks. · Get plenty of rest.  To prevent dehydration  · Add more fluids to your diet and daily routine, unless your doctor has told you not to. · During hot weather, drink more fluids. Drink even more fluids if you exercise a lot. Stay away from drinks with alcohol or caffeine. · Watch for the symptoms of dehydration. These include:  ? A dry, sticky mouth. ? Dark yellow urine, and not much of it. ? Dry and sunken eyes. ? Feeling very tired. · Learn what problems can lead to dehydration. These include:  ? Diarrhea, fever, and vomiting. ? Any illness with a fever, such as pneumonia or the flu. ? Activities that cause heavy sweating, such as endurance races and heavy outdoor work in hot or humid weather. ?  Alcohol or drug use or problems caused by quitting their use (withdrawal). ? Certain medicines, such as cold and allergy pills (antihistamines), diet pills (diuretics), and laxatives. ? Certain diseases, such as diabetes, cancer, and heart or kidney disease. When should you call for help? Call 911 anytime you think you may need emergency care. For example, call if:    · You passed out (lost consciousness).    Call your doctor now or seek immediate medical care if:    · You are confused and cannot think clearly.     · You are dizzy or lightheaded, or you feel like you may faint.     · You have signs of needing more fluids. You have sunken eyes and a dry mouth, and you pass only a little dark urine.     · You cannot keep fluids down.    Watch closely for changes in your health, and be sure to contact your doctor if:    · You are not making tears.     · Your skin is very dry and sags slowly back into place after you pinch it.     · Your mouth and eyes are very dry. Where can you learn more? Go to http://roxy-britta.info/. Enter X870 in the search box to learn more about \"Dehydration: Care Instructions. \"  Current as of: September 23, 2018  Content Version: 12.1  © 8157-8175 [employer]. Care instructions adapted under license by [employer] (which disclaims liability or warranty for this information). If you have questions about a medical condition or this instruction, always ask your healthcare professional. [place] [address] any warranty or liability for your use of this information. Patient Education        Back Pain: Care Instructions  Your Care Instructions    Back pain has many possible causes. It is often related to problems with muscles and ligaments of the back. It may also be related to problems with the nerves, discs, or bones of the back. Moving, lifting, standing, sitting, or sleeping in an awkward way can strain the back.  Sometimes you don't notice the injury until later. Arthritis is another common cause of back pain. Although it may hurt a lot, back pain usually improves on its own within several weeks. Most people recover in 12 weeks or less. Using good home treatment and being careful not to stress your back can help you feel better sooner. Follow-up care is a key part of your treatment and safety. Be sure to make and go to all appointments, and call your doctor if you are having problems. It's also a good idea to know your test results and keep a list of the medicines you take. How can you care for yourself at home? · Sit or lie in positions that are most comfortable and reduce your pain. Try one of these positions when you lie down:  ? Lie on your back with your knees bent and supported by large pillows. ? Lie on the floor with your legs on the seat of a sofa or chair. ? Lie on your side with your knees and hips bent and a pillow between your legs. ? Lie on your stomach if it does not make pain worse. · Do not sit up in bed, and avoid soft couches and twisted positions. Bed rest can help relieve pain at first, but it delays healing. Avoid bed rest after the first day of back pain. · Change positions every 30 minutes. If you must sit for long periods of time, take breaks from sitting. Get up and walk around, or lie in a comfortable position. · Try using a heating pad on a low or medium setting for 15 to 20 minutes every 2 or 3 hours. Try a warm shower in place of one session with the heating pad. · You can also try an ice pack for 10 to 15 minutes every 2 to 3 hours. Put a thin cloth between the ice pack and your skin. · Take pain medicines exactly as directed. ? If the doctor gave you a prescription medicine for pain, take it as prescribed. ? If you are not taking a prescription pain medicine, ask your doctor if you can take an over-the-counter medicine. · Take short walks several times a day.  You can start with 5 to 10 minutes, 3 or 4 times a day, and work up to longer walks. Walk on level surfaces and avoid hills and stairs until your back is better. · Return to work and other activities as soon as you can. Continued rest without activity is usually not good for your back. · To prevent future back pain, do exercises to stretch and strengthen your back and stomach. Learn how to use good posture, safe lifting techniques, and proper body mechanics. When should you call for help? Call your doctor now or seek immediate medical care if:    · You have new or worsening numbness in your legs.     · You have new or worsening weakness in your legs. (This could make it hard to stand up.)     · You lose control of your bladder or bowels.    Watch closely for changes in your health, and be sure to contact your doctor if:    · You have a fever, lose weight, or don't feel well.     · You do not get better as expected. Where can you learn more? Go to http://roxyGeneraytorbritta.info/. Enter E203 in the search box to learn more about \"Back Pain: Care Instructions. \"  Current as of: September 20, 2018  Content Version: 12.1  © 7183-9307 [employer]. Care instructions adapted under license by [employer] (which disclaims liability or warranty for this information). If you have questions about a medical condition or this instruction, always ask your healthcare professional. [place] [address] any warranty or liability for your use of this information. Patient Education        Hypokalemia: Care Instructions  Your Care Instructions    Hypokalemia (say \"xp-zj-[id number]-SONIA-wagner-uh\") is a low level of potassium. The heart, muscles, kidneys, and nervous system all need potassium to work well. This problem has many different causes. Kidney problems, diet, and medicines like diuretics and laxatives can cause it. So can vomiting or diarrhea. In some cases, cancer is the cause.  Your doctor may do tests to find the cause of your low potassium levels. You may need medicines to bring your potassium levels back to normal. You may also need regular blood tests to check your potassium. If you have very low potassium, you may need intravenous (IV) medicines. You also may need tests to check the electrical activity of your heart. Heart problems caused by low potassium levels can be very serious. Follow-up care is a key part of your treatment and safety. Be sure to make and go to all appointments, and call your doctor if you are having problems. It's also a good idea to know your test results and keep a list of the medicines you take. How can you care for yourself at home? · If your doctor recommends it, eat foods that have a lot of potassium. These include fresh fruits, juices, and vegetables. They also include nuts, beans, and milk. · Be safe with medicines. If your doctor prescribes medicines or potassium supplements, take them exactly as directed. Call your doctor if you have any problems with your medicines. · Get your potassium levels tested as often as your doctor tells you. When should you call for help? Call 911 anytime you think you may need emergency care. For example, call if:    · You feel like your heart is missing beats.  Heart problems caused by low potassium can cause death.     · You passed out (lost consciousness).     · You have a seizure.    Call your doctor now or seek immediate medical care if:    · You feel weak or unusually tired.     · You have severe arm or leg cramps.     · You have tingling or numbness.     · You feel sick to your stomach, or you vomit.     · You have belly cramps.     · You feel bloated or constipated.     · You have to urinate a lot.     · You feel very thirsty most of the time.     · You are dizzy or lightheaded, or you feel like you may faint.     · You feel depressed, or you lose touch with reality.    Watch closely for changes in your health, and be sure to contact your doctor if:    · You do not get better as expected. Where can you learn more? Go to http://roxy-britta.info/. Enter G358 in the search box to learn more about \"Hypokalemia: Care Instructions. \"  Current as of: November 6, 2018  Content Version: 12.1  © 4381-1834 [employer]. Care instructions adapted under license by [employer] (which disclaims liability or warranty for this information). If you have questions about a medical condition or this instruction, always ask your healthcare professional. [address] any warranty or liability for your use of this information.

## 2019-08-20 NOTE — ED NOTES
Bedside shift change report given to TARSHA Villatoro (ICU Nurse assisting with the ED) (oncoming nurse) by Chata Lamb RN (offgoing nurse). Report included the following information SBAR, ED Summary, MAR and Recent Results.

## 2019-08-20 NOTE — DISCHARGE INSTRUCTIONS
Patient Education        Diarrhea: Care Instructions  Your Care Instructions    Diarrhea is loose, watery stools (bowel movements). The exact cause is often hard to find. Sometimes diarrhea is your body's way of getting rid of what caused an upset stomach. Viruses, food poisoning, and many medicines can cause diarrhea. Some people get diarrhea in response to emotional stress, anxiety, or certain foods. Almost everyone has diarrhea now and then. It usually isn't serious, and your stools will return to normal soon. The important thing to do is replace the fluids you have lost, so you can prevent dehydration. The doctor has checked you carefully, but problems can develop later. If you notice any problems or new symptoms, get medical treatment right away. Follow-up care is a key part of your treatment and safety. Be sure to make and go to all appointments, and call your doctor if you are having problems. It's also a good idea to know your test results and keep a list of the medicines you take. How can you care for yourself at home? · Watch for signs of dehydration, which means your body has lost too much water. Dehydration is a serious condition and should be treated right away. Signs of dehydration are:  ? Increasing thirst and dry eyes and mouth. ? Feeling faint or lightheaded. ? Darker urine, and a smaller amount of urine than normal.  · To prevent dehydration, drink plenty of fluids, enough so that your urine is light yellow or clear like water. Choose water and other caffeine-free clear liquids until you feel better. If you have kidney, heart, or liver disease and have to limit fluids, talk with your doctor before you increase the amount of fluids you drink. · Begin eating small amounts of mild foods the next day, if you feel like it. ? Try yogurt that has live cultures of Lactobacillus. (Check the label.)  ?  Avoid spicy foods, fruits, alcohol, and caffeine until 48 hours after all symptoms are gone.  ? Avoid chewing gum that contains sorbitol. ? Avoid dairy products (except for yogurt with Lactobacillus) while you have diarrhea and for 3 days after symptoms are gone. · The doctor may recommend that you take over-the-counter medicine, such as loperamide (Imodium), if you still have diarrhea after 6 hours. Read and follow all instructions on the label. Do not use this medicine if you have bloody diarrhea, a high fever, or other signs of serious illness. Call your doctor if you think you are having a problem with your medicine. When should you call for help? Call 911 anytime you think you may need emergency care. For example, call if:    · You passed out (lost consciousness).     · Your stools are maroon or very bloody.    Call your doctor now or seek immediate medical care if:    · You are dizzy or lightheaded, or you feel like you may faint.     · Your stools are black and look like tar, or they have streaks of blood.     · You have new or worse belly pain.     · You have symptoms of dehydration, such as:  ? Dry eyes and a dry mouth. ? Passing only a little dark urine. ? Feeling thirstier than usual.     · You have a new or higher fever.    Watch closely for changes in your health, and be sure to contact your doctor if:    · Your diarrhea is getting worse.     · You see pus in the diarrhea.     · You are not getting better after 2 days (48 hours). Where can you learn more? Go to http://roxy-britta.info/. Enter E187 in the search box to learn more about \"Diarrhea: Care Instructions. \"  Current as of: September 23, 2018  Content Version: 12.1  © 3239-3776 Healthwise, Incorporated. Care instructions adapted under license by [employer] (which disclaims liability or warranty for this information).  If you have questions about a medical condition or this instruction, always ask your healthcare professional. [place] [address] any warranty or liability for your use of this information. Patient Education        Hypokalemia: Care Instructions  Your Care Instructions    Hypokalemia (say \"sn-vq-[id number]-SONIA-wagner-uh\") is a low level of potassium. The heart, muscles, kidneys, and nervous system all need potassium to work well. This problem has many different causes. Kidney problems, diet, and medicines like diuretics and laxatives can cause it. So can vomiting or diarrhea. In some cases, cancer is the cause. Your doctor may do tests to find the cause of your low potassium levels. You may need medicines to bring your potassium levels back to normal. You may also need regular blood tests to check your potassium. If you have very low potassium, you may need intravenous (IV) medicines. You also may need tests to check the electrical activity of your heart. Heart problems caused by low potassium levels can be very serious. Follow-up care is a key part of your treatment and safety. Be sure to make and go to all appointments, and call your doctor if you are having problems. It's also a good idea to know your test results and keep a list of the medicines you take. How can you care for yourself at home? · If your doctor recommends it, eat foods that have a lot of potassium. These include fresh fruits, juices, and vegetables. They also include nuts, beans, and milk. · Be safe with medicines. If your doctor prescribes medicines or potassium supplements, take them exactly as directed. Call your doctor if you have any problems with your medicines. · Get your potassium levels tested as often as your doctor tells you. When should you call for help? Call 911 anytime you think you may need emergency care. For example, call if:    · You feel like your heart is missing beats.  Heart problems caused by low potassium can cause death.     · You passed out (lost consciousness).     · You have a seizure.    Call your doctor now or seek immediate medical care if:    · You feel weak or unusually tired.     · You have severe arm or leg cramps.     · You have tingling or numbness.     · You feel sick to your stomach, or you vomit.     · You have belly cramps.     · You feel bloated or constipated.     · You have to urinate a lot.     · You feel very thirsty most of the time.     · You are dizzy or lightheaded, or you feel like you may faint.     · You feel depressed, or you lose touch with reality.    Watch closely for changes in your health, and be sure to contact your doctor if:    · You do not get better as expected. Where can you learn more? Go to http://roxy-britta.info/. Enter G358 in the search box to learn more about \"Hypokalemia: Care Instructions. \"  Current as of: November 6, 2018  Content Version: 12.1  © 8939-1641 Healthwise, Incorporated. Care instructions adapted under license by [employer] (which disclaims liability or warranty for this information). If you have questions about a medical condition or this instruction, always ask your healthcare professional. [address] any warranty or liability for your use of this information.

## 2019-08-20 NOTE — ED NOTES
I have reviewed discharge instructions with the patient. The patient verbalized understanding. No further questions at this time

## 2019-08-20 NOTE — ED PROVIDER NOTES
EMERGENCY DEPARTMENT HISTORY AND PHYSICAL EXAM  This was created with voice recognition software and transcription errors may be present. 8:09 AM  Date: 8/20/2019  Patient Name: Phylicia Paris    History of Presenting Illness     Chief Complaint:    History Provided By:     HPI: Phylicia Paris is a 46 y.o. female no history of renal failure hypertension hyponatremia who presents with diarrhea. Patient states she feels that she was actually here yesterday for diarrhea and dehydration not for the back pain is noted. She states that she was going to her doctor's appointment so she put all her belongings that she is homeless onto a porch of someone that she thought would be okay with it. They were not okay with it they called police and she had to move all her belongings it was unusually hot yesterday the movement of her belongings caused her to be extremely sweaty and when she was picked up to go to her doctor she had some vomiting. She notes that she was discharged here around 4 AM and that since then she has had one episode of loose stool. She is concerned she may have diarrhea and dehydration. Was on antibiotics during her last hospitalization that was in July. PCP: Frida Quintero MD      Past History     Past Medical History:  Past Medical History:   Diagnosis Date    Acute renal failure (ARF) ([facility] [address] 7/15/2019    Hypertension     Hyponatremia 7/15/2019    Other ill-defined conditions(799.89)     high cholesterol    Psychiatric disorder        Past Surgical History:  No past surgical history on file. Family History:  Family History   Problem Relation Age of Onset   [address] [facility] Stroke Mother     Diabetes Mother     Hypertension Father     Diabetes Father     Cancer Paternal Uncle        Social History:  Social History     Tobacco Use    Smoking status: Never Smoker    Smokeless tobacco: Never Used   Substance Use Topics    Alcohol use: Yes    Drug use: No       Allergies:   Allergies   Allergen Reactions    Sulfa (Sulfonamide Antibiotics) Rash       Review of Systems     Review of Systems   All other systems reviewed and are negative. 10 point review of systems otherwise negative unless noted in HPI. Physical Exam       Physical Exam   Constitutional: She is oriented to person, place, and time. She appears well-developed. HENT:   Head: Normocephalic and atraumatic. Eyes: Pupils are equal, round, and reactive to light. EOM are normal.   Neck: Normal range of motion. Neck supple. Cardiovascular: Normal rate, regular rhythm and normal heart sounds. Exam reveals no friction rub. No murmur heard. Pulmonary/Chest: Effort normal and breath sounds normal. No respiratory distress. She has no wheezes. Abdominal: Soft. She exhibits no distension. There is no tenderness. There is no rebound and no guarding. Musculoskeletal: Normal range of motion. Neurological: She is alert and oriented to person, place, and time. Skin: Skin is warm and dry. Psychiatric: She has a normal mood and affect. Her behavior is normal. Thought content normal.       Diagnostic Study Results     Vital Signs   Visit Vitals  /73 (BP 1 Location: Left arm, BP Patient Position: Sitting)   Pulse (!) 103   Temp 97.8 °F (36.6 °C)   Resp 18   Ht 5' 1\" (1.549 m)   Wt 74.4 kg (164 lb)   SpO2 99%   BMI 30.99 kg/m²      EKG: none  Labs:   Imaging:     Medical Decision Making     ED Course: Progress Notes, Reevaluation, and Consults:      Provider Notes (Medical Decision Making): 79-year-old female presents with concerns of dehydration and diarrhea will send a C. difficile sample and basic labs. Overall she is well-appearing low suspicion for admission. She did have labs drawn last night specific gravity of her urine was 1.005 low suspicion of any dehydration. With chronic back pain requesting lidocaine cream because she is able to afford it with insurance but not on her own.        Diagnosis     Clinical Impression: No diagnosis found. Disposition:    Patient's Medications   Start Taking    No medications on file   Continue Taking    HYDROCHLOROTHIAZIDE (HYDRODIURIL) 25 MG TABLET    Take 1 Tab by mouth daily. IBUPROFEN (MOTRIN) 800 MG TABLET    Take 1 Tab by mouth every six (6) hours as needed for Pain for up to 7 days. MULTIVITAMIN (ONE A DAY) TABLET    Take 1 Tab by mouth daily. OMEGA-3 ACID ETHYL ESTERS (LOVAZA) 1 GRAM CAPSULE    Take 2 Caps by mouth two (2) times a day. POTASSIUM CHLORIDE SR (K-TAB) 20 MEQ TABLET    Take 1 Tab by mouth two (2) times a day for 7 days. TRAZODONE (DESYREL) 100 MG TABLET    Take 1 Tab by mouth nightly.    These Medications have changed    No medications on file   Stop Taking    No medications on file

## 2019-08-22 LAB
BACTERIA SPEC CULT: NORMAL
SERVICE CMNT-IMP: NORMAL

## 2019-08-23 LAB
O+P SPEC MICRO: NORMAL
O+P STL CONC: NORMAL
SPECIMEN SOURCE: NORMAL

## 2019-08-27 LAB
G LAMBLIA AG STL QL IA: NEGATIVE
SPECIMEN SOURCE: NORMAL

## 2019-08-28 ENCOUNTER — HOSPITAL ENCOUNTER (EMERGENCY)
Age: 54
Discharge: HOME | End: 2019-08-28
Attending: EMERGENCY MEDICINE
Payer: MEDICAID

## 2019-08-28 VITALS
HEART RATE: 85 BPM | DIASTOLIC BLOOD PRESSURE: 82 MMHG | SYSTOLIC BLOOD PRESSURE: 117 MMHG | TEMPERATURE: 97.8 F | OXYGEN SATURATION: 98 % | RESPIRATION RATE: 19 BRPM

## 2019-08-28 DIAGNOSIS — Z01.30 BLOOD PRESSURE CHECK: Primary | ICD-10-CM

## 2019-08-28 PROCEDURE — 99281 EMR DPT VST MAYX REQ PHY/QHP: CPT

## 2019-08-29 NOTE — ED PROVIDER NOTES
[facility]  Emergency Department Treatment Report        9:00 PM Ramesh Sue is a 46 y.o. female with a history of hypertension who presents to ED for blood pressure check. No symptoms reported no chest pain or shortness of breath no headaches. No other complaints, associated symptoms or modifying factors at this time. PCP: Natalia Johnson MD      The history is provided by the patient. No  was used. Past Medical History:   Diagnosis Date    Acute renal failure (ARF) ([facility] [address] 7/15/2019    Hypertension     Hyponatremia 7/15/2019    Other ill-defined conditions(799.89)     high cholesterol    Psychiatric disorder        History reviewed. No pertinent surgical history. Family History:   Problem Relation Age of Onset    Stroke Mother     Diabetes Mother     Hypertension Father     Diabetes Father     Cancer Paternal Uncle        Social History     Socioeconomic History    Marital status:      Spouse name: Not on file    Number of children: Not on file    Years of education: Not on file    Highest education level: Not on file   Occupational History    Not on file   Social Needs    Financial resource strain: Not on file    Food insecurity:     Worry: Not on file     Inability: Not on file    Transportation needs:     Medical: Not on file     Non-medical: Not on file   Tobacco Use    Smoking status: Never Smoker    Smokeless tobacco: Never Used   Substance and Sexual Activity    Alcohol use:  Yes    Drug use: No    Sexual activity: Never   Lifestyle    Physical activity:     Days per week: Not on file     Minutes per session: Not on file    Stress: Not on file   Relationships    Social connections:     Talks on phone: Not on file     Gets together: Not on file     Attends Gnosticist service: Not on file     Active member of club or organization: Not on file     Attends meetings of clubs or organizations: Not on file     Relationship status: Not on file    Intimate partner violence:     Fear of current or ex partner: Not on file     Emotionally abused: Not on file     Physically abused: Not on file     Forced sexual activity: Not on file   Other Topics Concern    Not on file   Social History Narrative    Not on file         ALLERGIES: Sulfa (sulfonamide antibiotics)    Review of Systems   Constitutional: Negative for fever. Respiratory: Negative for shortness of breath. Cardiovascular: Negative for chest pain. Gastrointestinal: Negative for abdominal pain. Musculoskeletal: Negative for arthralgias. Neurological: Negative for dizziness and headaches. All other systems reviewed and are negative. Vitals:    08/28/19 1958   BP: 117/82   Pulse: 85   Resp: 19   Temp: 97.8 °F (36.6 °C)   SpO2: 98%            Physical Exam   Constitutional: She is oriented to person, place, and time. She appears well-developed and well-nourished. HENT:   Head: Normocephalic and atraumatic. Eyes: Pupils are equal, round, and reactive to light. Conjunctivae and EOM are normal.   Neck: Normal range of motion. Neck supple. Cardiovascular: Normal rate and regular rhythm. Pulmonary/Chest: Effort normal and breath sounds normal.   Abdominal: Soft. Bowel sounds are normal.   Musculoskeletal: Normal range of motion. Neurological: She is alert and oriented to person, place, and time. She has normal reflexes. Skin: Skin is warm and dry. Psychiatric: She has a normal mood and affect. Her behavior is normal. Judgment and thought content normal.   Nursing note and vitals reviewed. MDM  Number of Diagnoses or Management Options  Blood pressure check: minor  Diagnosis management comments: Blood pressure within normal limits patient be discharged home to continue her regular hypertension medicines and see her primary care.   Further work-up or acute illness is suspected       Amount and/or Complexity of Data Reviewed  Review and summarize past medical records: yes  Independent visualization of images, tracings, or specimens: yes    Risk of Complications, Morbidity, and/or Mortality  Presenting problems: low  Diagnostic procedures: low  Management options: low    Patient Progress  Patient progress: stable         Procedures            Vitals:  Patient Vitals for the past 12 hrs:   Temp Pulse Resp BP SpO2   08/28/19 1958 97.8 °F (36.6 °C) 85 19 117/82 98 %           Disposition:    Diagnosis:   1. Blood pressure check        Disposition: to Home      Follow-up Information     Follow up With Specialties Details Why Contact Info    Zully Braswell MD [facility] In 2 days  [address] [facility]  [phone]             Patient's Medications   Start Taking    No medications on file   Continue Taking    HYDROCHLOROTHIAZIDE (HYDRODIURIL) 25 MG TABLET    Take 1 Tab by mouth daily. LIDOCAINE 5 % TOPICAL CREAM    Apply  to affected area two (2) times daily as needed for Itching. MULTIVITAMIN (ONE A DAY) TABLET    Take 1 Tab by mouth daily. OMEGA-3 ACID ETHYL ESTERS (LOVAZA) 1 GRAM CAPSULE    Take 2 Caps by mouth two (2) times a day. TRAZODONE (DESYREL) 100 MG TABLET    Take 1 Tab by mouth nightly. These Medications have changed    No medications on file   Stop Taking    No medications on file       Return to the ER if you are unable to obtain referral as directed. Cassie Joseph  results have been reviewed with her. She has been counseled regarding her diagnosis, treatment, and plan. She verbally conveys understanding and agreement of the signs, symptoms, diagnosis, treatment and prognosis and additionally agrees to follow up as discussed. She also agrees with the care-plan and conveys that all of her questions have been answered.   I have also provided discharge instructions for her that include: educational information regarding their diagnosis and treatment, and list of reasons why they would want to return to the ED prior to their follow-up appointment, should her condition change. Joanna Reveles ENP-C,FNP-C      Dragon voice recognition was used to generate this report, which may have resulted in some phonetic based errors in grammar and contents.  Even though attempts were made to correct all the mistakes, some may have been missed, and remained in the body of the document

## 2019-09-03 ENCOUNTER — HOSPITAL ENCOUNTER (EMERGENCY)
Age: 54
Discharge: HOME | End: 2019-09-04
Attending: EMERGENCY MEDICINE
Payer: MEDICAID

## 2019-09-03 ENCOUNTER — HOSPITAL ENCOUNTER (EMERGENCY)
Dept: ULTRASOUND IMAGING | Age: 54
Discharge: HOME | End: 2019-09-03
Attending: PHYSICIAN ASSISTANT
Payer: MEDICAID

## 2019-09-03 DIAGNOSIS — R42 VERTIGO: Primary | ICD-10-CM

## 2019-09-03 DIAGNOSIS — N18.9 CHRONIC KIDNEY DISEASE, UNSPECIFIED CKD STAGE: ICD-10-CM

## 2019-09-03 DIAGNOSIS — R79.89 ELEVATED LFTS: ICD-10-CM

## 2019-09-03 LAB
ALBUMIN SERPL-MCNC: 4.1 G/DL (ref 3.4–5)
ALBUMIN/GLOB SERPL: 1 {RATIO} (ref 0.8–1.7)
ALP SERPL-CCNC: 134 U/L (ref 45–117)
ALT SERPL-CCNC: 80 U/L (ref 13–56)
ANION GAP SERPL CALC-SCNC: 13 MMOL/L (ref 3–18)
APPEARANCE UR: ABNORMAL
AST SERPL-CCNC: 230 U/L (ref 10–38)
BACTERIA URNS QL MICRO: ABNORMAL /HPF
BASOPHILS # BLD: 0 K/UL (ref 0–0.1)
BASOPHILS NFR BLD: 0 % (ref 0–2)
BILIRUB SERPL-MCNC: 0.9 MG/DL (ref 0.2–1)
BILIRUB UR QL: NEGATIVE
BUN SERPL-MCNC: 31 MG/DL (ref 7–18)
BUN/CREAT SERPL: 16 (ref 12–20)
CALCIUM SERPL-MCNC: 8.9 MG/DL (ref 8.5–10.1)
CHLORIDE SERPL-SCNC: 94 MMOL/L (ref 100–111)
CO2 SERPL-SCNC: 23 MMOL/L (ref 21–32)
COLOR UR: YELLOW
CREAT SERPL-MCNC: 1.93 MG/DL (ref 0.6–1.3)
DIFFERENTIAL METHOD BLD: ABNORMAL
EOSINOPHIL # BLD: 0 K/UL (ref 0–0.4)
EOSINOPHIL NFR BLD: 0 % (ref 0–5)
EPITH CASTS URNS QL MICRO: ABNORMAL /LPF (ref 0–5)
ERYTHROCYTE [DISTWIDTH] IN BLOOD BY AUTOMATED COUNT: 14.1 % (ref 11.6–14.5)
GLOBULIN SER CALC-MCNC: 4 G/DL (ref 2–4)
GLUCOSE SERPL-MCNC: 162 MG/DL (ref 74–99)
GLUCOSE UR STRIP.AUTO-MCNC: NEGATIVE MG/DL
HCT VFR BLD AUTO: 36.8 % (ref 35–45)
HGB BLD-MCNC: 13 G/DL (ref 12–16)
HGB UR QL STRIP: ABNORMAL
KETONES UR QL STRIP.AUTO: NEGATIVE MG/DL
LEUKOCYTE ESTERASE UR QL STRIP.AUTO: NEGATIVE
LIPASE SERPL-CCNC: 231 U/L (ref 73–393)
LYMPHOCYTES # BLD: 0.8 K/UL (ref 0.9–3.6)
LYMPHOCYTES NFR BLD: 7 % (ref 21–52)
MCH RBC QN AUTO: 38.3 PG (ref 24–34)
MCHC RBC AUTO-ENTMCNC: 35.3 G/DL (ref 31–37)
MCV RBC AUTO: 108.6 FL (ref 74–97)
MONOCYTES # BLD: 0.4 K/UL (ref 0.05–1.2)
MONOCYTES NFR BLD: 3 % (ref 3–10)
NEUTS SEG # BLD: 10.7 K/UL (ref 1.8–8)
NEUTS SEG NFR BLD: 90 % (ref 40–73)
NITRITE UR QL STRIP.AUTO: NEGATIVE
PH UR STRIP: 5 [PH] (ref 5–8)
PLATELET # BLD AUTO: 266 K/UL (ref 135–420)
PMV BLD AUTO: 10.2 FL (ref 9.2–11.8)
POTASSIUM SERPL-SCNC: 3.9 MMOL/L (ref 3.5–5.5)
PROT SERPL-MCNC: 8.1 G/DL (ref 6.4–8.2)
PROT UR STRIP-MCNC: NEGATIVE MG/DL
RBC # BLD AUTO: 3.39 M/UL (ref 4.2–5.3)
RBC #/AREA URNS HPF: ABNORMAL /HPF (ref 0–5)
SODIUM SERPL-SCNC: 130 MMOL/L (ref 136–145)
SP GR UR REFRACTOMETRY: 1.02 (ref 1–1.03)
UROBILINOGEN UR QL STRIP.AUTO: 0.2 EU/DL (ref 0.2–1)
WBC # BLD AUTO: 11.9 K/UL (ref 4.6–13.2)
WBC URNS QL MICRO: ABNORMAL /HPF (ref 0–4)

## 2019-09-03 PROCEDURE — 85025 COMPLETE CBC W/AUTO DIFF WBC: CPT

## 2019-09-03 PROCEDURE — 81001 URINALYSIS AUTO W/SCOPE: CPT

## 2019-09-03 PROCEDURE — 83690 ASSAY OF LIPASE: CPT

## 2019-09-03 PROCEDURE — 74011250636 HC RX REV CODE- 250/636: Performed by: PHYSICIAN ASSISTANT

## 2019-09-03 PROCEDURE — 99284 EMERGENCY DEPT VISIT MOD MDM: CPT

## 2019-09-03 PROCEDURE — 80053 COMPREHEN METABOLIC PANEL: CPT

## 2019-09-03 PROCEDURE — 76705 ECHO EXAM OF ABDOMEN: CPT

## 2019-09-03 RX ORDER — MECLIZINE HYDROCHLORIDE 25 MG/1
25 TABLET ORAL
Qty: 10 TAB | Refills: 0 | Status: SHIPPED | OUTPATIENT
Start: 2019-09-03 | End: 2019-09-13

## 2019-09-03 RX ORDER — ONDANSETRON 2 MG/ML
4 INJECTION INTRAMUSCULAR; INTRAVENOUS
Status: DISCONTINUED | OUTPATIENT
Start: 2019-09-03 | End: 2019-09-03 | Stop reason: SDUPTHER

## 2019-09-03 RX ORDER — ONDANSETRON 2 MG/ML
4 INJECTION INTRAMUSCULAR; INTRAVENOUS ONCE
Status: DISCONTINUED | OUTPATIENT
Start: 2019-09-03 | End: 2019-09-04 | Stop reason: HOSPADM

## 2019-09-03 RX ORDER — MECLIZINE HCL 12.5 MG 12.5 MG/1
25 TABLET ORAL DAILY
Status: DISCONTINUED | OUTPATIENT
Start: 2019-09-04 | End: 2019-09-04 | Stop reason: HOSPADM

## 2019-09-03 RX ORDER — SODIUM CHLORIDE 9 MG/ML
100 INJECTION, SOLUTION INTRAVENOUS CONTINUOUS
Status: DISCONTINUED | OUTPATIENT
Start: 2019-09-03 | End: 2019-09-03

## 2019-09-03 RX ADMIN — SODIUM CHLORIDE 1000 ML: 900 INJECTION, SOLUTION INTRAVENOUS at 23:00

## 2019-09-03 NOTE — ED PROVIDER NOTES
EMERGENCY DEPARTMENT HISTORY AND PHYSICAL EXAM    6:26 PM      Date: 9/3/2019  Patient Name: Ann Ribeiro    History of Presenting Illness     Chief Complaint   Patient presents with    Diarrhea    Vomiting          History Provided By: Patient    Chief Complaint: Dizziness, vomiting      Additional History (Context): Ann Ribeiro is a 46 y.o. female with hypertension who presents with several weeks of dizziness upon standing with nausea, vomiting, and diarrhea. When asked further details of her symptoms, patient states, \"Man, I live in the abandoned building across the street. I need some rest and a shower. \" When asked what may be causing her symptoms, the patient states, \"My mother had something called vertigo. It may be that. \" She also attests to chronic, mild lumbar back pain that is exacerbated by movement and unchanged at this time. Patient denies fever, lightheadedness, syncope, chest pain, and shortness of breath. She has not taken anything for her symptoms. PCP: Savanah Solomon MD    Current Facility-Administered Medications   Medication Dose Route Frequency Provider Last Rate Last Dose    sodium chloride 0.9 % bolus infusion 1,000 mL  1,000 mL IntraVENous ONCE Kalin Palmer PA-C        [START ON 9/4/2019] meclizine (ANTIVERT) tablet 25 mg  25 mg Oral DAILY Kalin Palmer PA-C        ondansetron (ZOFRAN) injection 4 mg  4 mg IntraVENous ONCE Kalin Palmer PA-C         Current Outpatient Medications   Medication Sig Dispense Refill    meclizine (ANTIVERT) 25 mg tablet Take 1 Tab by mouth three (3) times daily as needed for Dizziness for up to 10 days. 10 Tab 0    lidocaine 5 % topical cream Apply  to affected area two (2) times daily as needed for Itching. 1 Tube 0    hydroCHLOROthiazide (HYDRODIURIL) 25 mg tablet Take 1 Tab by mouth daily. 30 Tab 0    traZODone (DESYREL) 100 mg tablet Take 1 Tab by mouth nightly.  30 Tab 1    omega-3 acid ethyl esters (LOVAZA) 1 gram capsule Take 2 Caps by mouth two (2) times a day. 360 Cap 3    multivitamin (ONE A DAY) tablet Take 1 Tab by mouth daily. Past History     Past Medical History:  Past Medical History:   Diagnosis Date    Acute renal failure (ARF) ([address] 7/15/2019    Hypertension     Hyponatremia 7/15/2019    Other ill-defined conditions(799.89)     high cholesterol    Psychiatric disorder        Past Surgical History:  No past surgical history on file. Family History:  Family History   Problem Relation Age of Onset   [facility] Stroke Mother     Diabetes Mother     Hypertension Father     Diabetes Father     Cancer Paternal Uncle        Social History:  Social History     Tobacco Use    Smoking status: Never Smoker    Smokeless tobacco: Never Used   Substance Use Topics    Alcohol use: Yes    Drug use: No       Allergies: Allergies   Allergen Reactions    Sulfa (Sulfonamide Antibiotics) Rash         Review of Systems       Review of Systems   Constitutional: Negative for fever and unexpected weight change. HENT: Negative for facial swelling. Eyes: Negative for visual disturbance. Respiratory: Negative for shortness of breath. Cardiovascular: Negative for chest pain. Gastrointestinal: Positive for diarrhea, nausea and vomiting. Negative for abdominal pain and blood in stool. Genitourinary: Negative for dysuria. Musculoskeletal: Positive for back pain. Negative for neck pain. Skin: Negative for rash. Neurological: Positive for dizziness. Negative for syncope and light-headedness. Psychiatric/Behavioral: Negative for confusion. All other systems reviewed and are negative. Physical Exam     Visit Vitals  /79 (BP 1 Location: Right arm, BP Patient Position: At rest)   Pulse (!) 101   Temp 99.2 °F (37.3 °C)   Resp 14   Ht 5' 1\" (1.549 m)   Wt 71.8 kg (158 lb 4.8 oz)   SpO2 98%   BMI 29.91 kg/m²         Physical Exam   Constitutional: She is oriented to person, place, and time.  She appears well-developed and well-nourished. No distress. HENT:   Head: Normocephalic and atraumatic. Eyes: Pupils are equal, round, and reactive to light. Conjunctivae and EOM are normal.   Neck: Normal range of motion. Cardiovascular: Normal rate, regular rhythm and normal heart sounds. Exam reveals no gallop and no friction rub. No murmur heard. Pulmonary/Chest: Effort normal and breath sounds normal. No respiratory distress. She has no wheezes. She has no rales. Abdominal: Soft. She exhibits no distension and no mass. There is no tenderness. There is no rebound and no guarding. Musculoskeletal: Normal range of motion. She exhibits no edema or tenderness. Neurological: She is alert and oriented to person, place, and time. Skin: Skin is warm and dry. She is not diaphoretic. Psychiatric: She has a normal mood and affect. Nursing note and vitals reviewed. Diagnostic Study Results     Labs -  Recent Results (from the past 12 hour(s))   METABOLIC PANEL, COMPREHENSIVE    Collection Time: 09/03/19  2:41 PM   Result Value Ref Range    Sodium 130 (L) 136 - 145 mmol/L    Potassium 3.9 3.5 - 5.5 mmol/L    Chloride 94 (L) 100 - 111 mmol/L    CO2 23 21 - 32 mmol/L    Anion gap 13 3.0 - 18 mmol/L    Glucose 162 (H) 74 - 99 mg/dL    BUN 31 (H) 7.0 - 18 MG/DL    Creatinine 1.93 (H) 0.6 - 1.3 MG/DL    BUN/Creatinine ratio 16 12 - 20      GFR est AA 33 (L) >60 ml/min/1.73m2    GFR est non-AA 27 (L) >60 ml/min/1.73m2    Calcium 8.9 8.5 - 10.1 MG/DL    Bilirubin, total 0.9 0.2 - 1.0 MG/DL    ALT (SGPT) 80 (H) 13 - 56 U/L    AST (SGOT) 230 (H) 10 - 38 U/L    Alk.  phosphatase 134 (H) 45 - 117 U/L    Protein, total 8.1 6.4 - 8.2 g/dL    Albumin 4.1 3.4 - 5.0 g/dL    Globulin 4.0 2.0 - 4.0 g/dL    A-G Ratio 1.0 0.8 - 1.7     LIPASE    Collection Time: 09/03/19  2:41 PM   Result Value Ref Range    Lipase 231 73 - 393 U/L   CBC WITH AUTOMATED DIFF    Collection Time: 09/03/19  2:41 PM   Result Value Ref Range    WBC 11.9 4.6 - 13.2 K/uL    RBC 3.39 (L) 4.20 - 5.30 M/uL    HGB 13.0 12.0 - 16.0 g/dL    HCT 36.8 35.0 - 45.0 %    .6 (H) 74.0 - 97.0 FL    MCH 38.3 (H) 24.0 - 34.0 PG    MCHC 35.3 31.0 - 37.0 g/dL    RDW 14.1 11.6 - 14.5 %    PLATELET 092 815 - 368 K/uL    MPV 10.2 9.2 - 11.8 FL    NEUTROPHILS 90 (H) 40 - 73 %    LYMPHOCYTES 7 (L) 21 - 52 %    MONOCYTES 3 3 - 10 %    EOSINOPHILS 0 0 - 5 %    BASOPHILS 0 0 - 2 %    ABS. NEUTROPHILS 10.7 (H) 1.8 - 8.0 K/UL    ABS. LYMPHOCYTES 0.8 (L) 0.9 - 3.6 K/UL    ABS. MONOCYTES 0.4 0.05 - 1.2 K/UL    ABS. EOSINOPHILS 0.0 0.0 - 0.4 K/UL    ABS. BASOPHILS 0.0 0.0 - 0.1 K/UL    DF AUTOMATED     URINALYSIS W/ RFLX MICROSCOPIC    Collection Time: 09/03/19 10:45 PM   Result Value Ref Range    Color YELLOW      Appearance CLOUDY      Specific gravity 1.018 1.005 - 1.030      pH (UA) 5.0 5.0 - 8.0      Protein NEGATIVE  NEG mg/dL    Glucose NEGATIVE  NEG mg/dL    Ketone NEGATIVE  NEG mg/dL    Bilirubin NEGATIVE  NEG      Blood LARGE (A) NEG      Urobilinogen 0.2 0.2 - 1.0 EU/dL    Nitrites NEGATIVE  NEG      Leukocyte Esterase NEGATIVE  NEG     URINE MICROSCOPIC ONLY    Collection Time: 09/03/19 10:45 PM   Result Value Ref Range    WBC 5 to 9 0 - 4 /hpf    RBC 10 to 14 0 - 5 /hpf    Epithelial cells 2+ 0 - 5 /lpf    Bacteria 2+ (A) NEG /hpf       Radiologic Studies -   [facility]    (Results Pending)         Medical Decision Making   I am the first provider for this patient. I reviewed the vital signs, available nursing notes, past medical history, past surgical history, family history and social history. Vital Signs-Reviewed the patient's vital signs. Records Reviewed: Nursing Notes (Time of Review: 6:26 PM)    ED Course: Progress Notes, Reevaluation, and Consults:      Provider Notes (Medical Decision Making): MDM  Number of Diagnoses or Management Options  Chronic kidney disease, unspecified CKD stage:   Elevated LFTs:   Vertigo:   Diagnosis management comments: 53-y. o. Female patient with history of chronic mild lower back pain, HTN, and C. Diff, who presents for evaluation of several weeks of dizziness upon standing, nausea, vomiting, and diarrhea. Patient unable to quanitify vomiting or diarrhea. Physical exam reveals a grossly non-tender back, spine, paraspinal muscles, and abdomen. No edema. Lungs clear to auscultation. Normal heart tones. DDx: stroke, vertigo, electrolyte imbalance, dehydration, gastroparesis, PUD, UTI, gallstones, C. difficile infection    11:21 PM  Patient has not left stool sample. Urine is normal.  Labs show elevated liver enzymes and chronic kidney disease but no other abnormalities to explain symptoms. Neurologic exam is stable. Patient states she is here because she is homeless, does not have anywhere to sleep, and and needs food. She is very upset that she is going to be discharged. She is no longer having any symptoms. Her reported symptoms have been going on for weeks, I do not believe hese are symptoms of a CVA,  I suspect this is vertigo vs malingering. US was normal, no cholecystitis. F/u with PCP for LFTs and CKD. Discussed treatment plan, return precautions, symptomatic relief, and expected time to improvement. All questions answered. Patient is stable for discharge and outpatient management. Diagnosis     Clinical Impression:   1. Vertigo    2. Chronic kidney disease, unspecified CKD stage    3.  Elevated LFTs        Disposition: Discharged      Follow-up Information     Follow up With Specialties Details Why Contact Info    Mary Alice Milian MD Family Practice Schedule an appointment as soon as possible for a visit  [address] [place] [facility] EMERGENCY DEPT Emergency Medicine  Immediately if symptoms worsen [address] [place] [id number]  [phone]           Patient's Medications   Start Taking    MECLIZINE (ANTIVERT) 25 MG TABLET    Take 1 Tab by mouth three (3) times daily as needed for Dizziness for up to 10 days. Continue Taking    HYDROCHLOROTHIAZIDE (HYDRODIURIL) 25 MG TABLET    Take 1 Tab by mouth daily. LIDOCAINE 5 % TOPICAL CREAM    Apply  to affected area two (2) times daily as needed for Itching. MULTIVITAMIN (ONE A DAY) TABLET    Take 1 Tab by mouth daily. OMEGA-3 ACID ETHYL ESTERS (LOVAZA) 1 GRAM CAPSULE    Take 2 Caps by mouth two (2) times a day. TRAZODONE (DESYREL) 100 MG TABLET    Take 1 Tab by mouth nightly. These Medications have changed    No medications on file   Stop Taking    No medications on file     _______________________________    Attestations:  Pramod Jacobsen PA-C acting as a scribe for and in the presence of HOSSEIN Cisneros      September 03, 2019 at 11:23 PM       Provider Attestation:      I personally performed the services described in the documentation, reviewed the documentation, as recorded by the scribe in my presence, and it accurately and completely records my words and actions.  September 03, 2019 at 11:23 PM - HOSSEIN Cisneros  _______________________________

## 2019-09-04 VITALS
HEART RATE: 104 BPM | TEMPERATURE: 98 F | SYSTOLIC BLOOD PRESSURE: 173 MMHG | DIASTOLIC BLOOD PRESSURE: 105 MMHG | BODY MASS INDEX: 29.89 KG/M2 | HEIGHT: 61 IN | RESPIRATION RATE: 14 BRPM | OXYGEN SATURATION: 100 % | WEIGHT: 158.3 LBS

## 2019-09-04 NOTE — DISCHARGE INSTRUCTIONS
Patient Education        Dizziness: Care Instructions  Your Care Instructions  Dizziness is the feeling of unsteadiness or fuzziness in your head. It is different than having vertigo, which is a feeling that the room is spinning or that you are moving or falling. It is also different from lightheadedness, which is the feeling that you are about to faint. It can be hard to know what causes dizziness. Some people feel dizzy when they have migraine headaches. Sometimes bouts of flu can make you feel dizzy. Some medical conditions, such as heart problems or high blood pressure, can make you feel dizzy. Many medicines can cause dizziness, including medicines for high blood pressure, pain, or anxiety. If a medicine causes your symptoms, your doctor may recommend that you stop or change the medicine. If it is a problem with your heart, you may need medicine to help your heart work better. If there is no clear reason for your symptoms, your doctor may suggest watching and waiting for a while to see if the dizziness goes away on its own. Follow-up care is a key part of your treatment and safety. Be sure to make and go to all appointments, and call your doctor if you are having problems. It's also a good idea to know your test results and keep a list of the medicines you take. How can you care for yourself at home? · If your doctor recommends or prescribes medicine, take it exactly as directed. Call your doctor if you think you are having a problem with your medicine. · Do not drive while you feel dizzy. · Try to prevent falls. Steps you can take include:  ? Using nonskid mats, adding grab bars near the tub, and using night-lights. ? Clearing your home so that walkways are free of anything you might trip on.  ? Letting family and friends know that you have been feeling dizzy. This will help them know how to help you. When should you call for help? Call 911 anytime you think you may need emergency care.  For example, call if:    · You passed out (lost consciousness).     · You have dizziness along with symptoms of a heart attack. These may include:  ? Chest pain or pressure, or a strange feeling in the chest.  ? Sweating. ? Shortness of breath. ? Nausea or vomiting. ? Pain, pressure, or a strange feeling in the back, neck, jaw, or upper belly or in one or both shoulders or arms. ? Lightheadedness or sudden weakness. ? A fast or irregular heartbeat.     · You have symptoms of a stroke. These may include:  ? Sudden numbness, tingling, weakness, or loss of movement in your face, arm, or leg, especially on only one side of your body. ? Sudden vision changes. ? Sudden trouble speaking. ? Sudden confusion or trouble understanding simple statements. ? Sudden problems with walking or balance. ? A sudden, severe headache that is different from past headaches.    Call your doctor now or seek immediate medical care if:    · You feel dizzy and have a fever, headache, or ringing in your ears.     · You have new or increased nausea and vomiting.     · Your dizziness does not go away or comes back.    Watch closely for changes in your health, and be sure to contact your doctor if:    · You do not get better as expected. Where can you learn more? Go to http://roxy-britta.info/. Enter L143 in the search box to learn more about \"Dizziness: Care Instructions. \"  Current as of: September 23, 2018  Content Version: 12.1  © 2820-3084 [employer]. Care instructions adapted under license by [employer] (which disclaims liability or warranty for this information). If you have questions about a medical condition or this instruction, always ask your healthcare professional. [place] [address] any warranty or liability for your use of this information.          Patient Education        Vertigo: Care Instructions  Your Care Instructions    Vertigo is the feeling that you or your surroundings are moving when there is no actual movement. It is often described as a feeling of spinning, whirling, falling, or tilting. Vertigo may make you vomit or feel nauseated. You may have trouble standing or walking and may lose your balance. Vertigo is often related to an inner ear problem, but it can have other more serious causes. If vertigo continues, you may need more tests to find its cause. Follow-up care is a key part of your treatment and safety. Be sure to make and go to all appointments, and call your doctor if you are having problems. It's also a good idea to know your test results and keep a list of the medicines you take. How can you care for yourself at home? · Do not lie flat on your back. Prop yourself up slightly. This may reduce the spinning feeling. Keep your eyes open. · Move slowly so that you do not fall. · If your doctor recommends medicine, take it exactly as directed. · Do not drive while you are having vertigo. Certain exercises, called Jiang-Daroff exercises, can help decrease vertigo. To do Jiang-Daroff exercises:  · Sit on the edge of a bed or sofa and quickly lie down on the side that causes the worst vertigo. Lie on your side with your ear down. · Stay in this position for at least 30 seconds or until the vertigo goes away. · Sit up. If this causes vertigo, wait for it to stop. · Repeat the procedure on the other side. · Repeat this 10 times. Do these exercises 2 times a day until the vertigo is gone. When should you call for help? Call 911 anytime you think you may need emergency care. For example, call if:    · You passed out (lost consciousness).     · You have symptoms of a stroke. These may include:  ? Sudden numbness, tingling, weakness, or loss of movement in your face, arm, or leg, especially on only one side of your body. ? Sudden vision changes. ? Sudden trouble speaking. ? Sudden confusion or trouble understanding simple statements.   ? Sudden problems with walking or balance. ? A sudden, severe headache that is different from past headaches.    Call your doctor now or seek immediate medical care if:    · Vertigo occurs with a fever, a headache, or ringing in your ears.     · You have new or increased nausea and vomiting.    Watch closely for changes in your health, and be sure to contact your doctor if:    · Vertigo gets worse or happens more often.     · Vertigo has not gotten better after 2 weeks. Where can you learn more? Go to http://roxy-britta.info/. Enter O881 in the search box to learn more about \"Vertigo: Care Instructions. \"  Current as of: October 21, 2018  Content Version: 12.1  © 1592-2619 [employer]. Care instructions adapted under license by [employer] (which disclaims liability or warranty for this information). If you have questions about a medical condition or this instruction, always ask your healthcare professional. [place] [address] any warranty or liability for your use of this information.

## 2019-09-27 ENCOUNTER — HOSPITAL ENCOUNTER (EMERGENCY)
Age: 54
Discharge: HOME | End: 2019-09-28
Attending: EMERGENCY MEDICINE
Payer: MEDICAID

## 2019-09-27 VITALS
HEART RATE: 106 BPM | RESPIRATION RATE: 18 BRPM | TEMPERATURE: 98.5 F | DIASTOLIC BLOOD PRESSURE: 90 MMHG | SYSTOLIC BLOOD PRESSURE: 136 MMHG | HEIGHT: 61 IN | WEIGHT: 131.8 LBS | BODY MASS INDEX: 24.88 KG/M2 | OXYGEN SATURATION: 99 %

## 2019-09-27 DIAGNOSIS — F32.A DEPRESSION, UNSPECIFIED DEPRESSION TYPE: Primary | ICD-10-CM

## 2019-09-27 LAB
AMPHET UR QL SCN: NEGATIVE
ANION GAP SERPL CALC-SCNC: 14 MMOL/L (ref 3–18)
APPEARANCE UR: CLEAR
BARBITURATES UR QL SCN: NEGATIVE
BASOPHILS # BLD: 0 K/UL (ref 0–0.1)
BASOPHILS NFR BLD: 0 % (ref 0–2)
BENZODIAZ UR QL: NEGATIVE
BILIRUB UR QL: NEGATIVE
BUN SERPL-MCNC: 16 MG/DL (ref 7–18)
BUN/CREAT SERPL: 9 (ref 12–20)
CALCIUM SERPL-MCNC: 8.1 MG/DL (ref 8.5–10.1)
CANNABINOIDS UR QL SCN: NEGATIVE
CHLORIDE SERPL-SCNC: 97 MMOL/L (ref 100–111)
CO2 SERPL-SCNC: 21 MMOL/L (ref 21–32)
COCAINE UR QL SCN: NEGATIVE
COLOR UR: YELLOW
CREAT SERPL-MCNC: 1.69 MG/DL (ref 0.6–1.3)
DIFFERENTIAL METHOD BLD: ABNORMAL
EOSINOPHIL # BLD: 0 K/UL (ref 0–0.4)
EOSINOPHIL NFR BLD: 0 % (ref 0–5)
ERYTHROCYTE [DISTWIDTH] IN BLOOD BY AUTOMATED COUNT: 14.5 % (ref 11.6–14.5)
ETHANOL SERPL-MCNC: <3 MG/DL (ref 0–3)
GLUCOSE SERPL-MCNC: 112 MG/DL (ref 74–99)
GLUCOSE UR STRIP.AUTO-MCNC: NEGATIVE MG/DL
HCT VFR BLD AUTO: 33.8 % (ref 35–45)
HDSCOM,HDSCOM: NORMAL
HGB BLD-MCNC: 11.7 G/DL (ref 12–16)
HGB UR QL STRIP: NEGATIVE
KETONES UR QL STRIP.AUTO: NEGATIVE MG/DL
LEUKOCYTE ESTERASE UR QL STRIP.AUTO: NEGATIVE
LYMPHOCYTES # BLD: 1.2 K/UL (ref 0.9–3.6)
LYMPHOCYTES NFR BLD: 15 % (ref 21–52)
MCH RBC QN AUTO: 38 PG (ref 24–34)
MCHC RBC AUTO-ENTMCNC: 34.6 G/DL (ref 31–37)
MCV RBC AUTO: 109.7 FL (ref 74–97)
METHADONE UR QL: NEGATIVE
MONOCYTES # BLD: 0.5 K/UL (ref 0.05–1.2)
MONOCYTES NFR BLD: 6 % (ref 3–10)
NEUTS SEG # BLD: 6.3 K/UL (ref 1.8–8)
NEUTS SEG NFR BLD: 79 % (ref 40–73)
NITRITE UR QL STRIP.AUTO: NEGATIVE
OPIATES UR QL: NEGATIVE
PCP UR QL: NEGATIVE
PH UR STRIP: 5.5 [PH] (ref 5–8)
PLATELET # BLD AUTO: 306 K/UL (ref 135–420)
PMV BLD AUTO: 9.7 FL (ref 9.2–11.8)
POTASSIUM SERPL-SCNC: 3.5 MMOL/L (ref 3.5–5.5)
PROT UR STRIP-MCNC: NEGATIVE MG/DL
RBC # BLD AUTO: 3.08 M/UL (ref 4.2–5.3)
SODIUM SERPL-SCNC: 132 MMOL/L (ref 136–145)
SP GR UR REFRACTOMETRY: 1.01 (ref 1–1.03)
UROBILINOGEN UR QL STRIP.AUTO: 0.2 EU/DL (ref 0.2–1)
WBC # BLD AUTO: 8.1 K/UL (ref 4.6–13.2)

## 2019-09-27 PROCEDURE — 85025 COMPLETE CBC W/AUTO DIFF WBC: CPT

## 2019-09-27 PROCEDURE — 99283 EMERGENCY DEPT VISIT LOW MDM: CPT

## 2019-09-27 PROCEDURE — 81003 URINALYSIS AUTO W/O SCOPE: CPT

## 2019-09-27 PROCEDURE — 80048 BASIC METABOLIC PNL TOTAL CA: CPT

## 2019-09-27 PROCEDURE — 80307 DRUG TEST PRSMV CHEM ANLYZR: CPT

## 2019-09-27 NOTE — ED PROVIDER NOTES
EMERGENCY DEPARTMENT HISTORY AND PHYSICAL EXAM    5:59 PM      Date: 9/27/2019  Patient Name: Lambert Kamara    History of Presenting Illness     Chief Complaint   Patient presents with   [address] Problem         History Provided By: patient    Additional History (Context): Lambert Kamara is a 46 y.o. female presents with homelessness asking to speak to a psychiatrist she says she keeps vomiting and defecating on herself because it is difficult to get up off the concrete, where she has been sleeping. She has been put out by the friend she is been staying with, has problems following up with primary care because the office is across [facility]. She denies suicidal homicidal ideation and hallucinations. PCP: Kelly Serra MD    Chief Complaint:   Duration:    Timing:    Location:   Quality:   Severity:   Modifying Factors:   Associated Symptoms:       Current Outpatient Medications   Medication Sig Dispense Refill    lidocaine 5 % topical cream Apply  to affected area two (2) times daily as needed for Itching. 1 Tube 0    hydroCHLOROthiazide (HYDRODIURIL) 25 mg tablet Take 1 Tab by mouth daily. 30 Tab 0    traZODone (DESYREL) 100 mg tablet Take 1 Tab by mouth nightly. 30 Tab 1    omega-3 acid ethyl esters (LOVAZA) 1 gram capsule Take 2 Caps by mouth two (2) times a day. 360 Cap 3    multivitamin (ONE A DAY) tablet Take 1 Tab by mouth daily. Past History     Past Medical History:  Past Medical History:   Diagnosis Date    Acute renal failure (ARF) ([facility] [address] 7/15/2019    Hypertension     Hyponatremia 7/15/2019    Other ill-defined conditions(799.89)     high cholesterol    Psychiatric disorder        Past Surgical History:  No past surgical history on file.     Family History:  Family History   Problem Relation Age of Onset   [facility] Stroke Mother     Diabetes Mother     Hypertension Father     Diabetes Father     Cancer Paternal Uncle        Social History:  Social History     Tobacco Use    Smoking status: Never Smoker    Smokeless tobacco: Never Used   Substance Use Topics    Alcohol use: Yes    Drug use: No       Allergies: Allergies   Allergen Reactions    Sulfa (Sulfonamide Antibiotics) Rash         Review of Systems     Review of Systems   Constitutional: Negative for diaphoresis and fever. HENT: Negative for congestion and sore throat. Eyes: Negative for pain and itching. Respiratory: Negative for cough and shortness of breath. Cardiovascular: Negative for chest pain and palpitations. Gastrointestinal: Negative for abdominal pain and diarrhea. Endocrine: Negative for polydipsia and polyuria. Genitourinary: Negative for dysuria and hematuria. Musculoskeletal: Negative for arthralgias and myalgias. Skin: Negative for rash and wound. Neurological: Negative for seizures and syncope. Hematological: Does not bruise/bleed easily. Psychiatric/Behavioral: Positive for dysphoric mood. Negative for agitation and hallucinations. Physical Exam       Patient Vitals for the past 12 hrs:   Temp Pulse Resp BP SpO2   09/27/19 1648 97.9 °F (36.6 °C) (!) 111 18 (!) 133/92 99 %       Physical Exam   Constitutional: She appears well-developed and well-nourished. She appears distressed (Very anxious tearful). HENT:   Head: Normocephalic and atraumatic. Eyes: Conjunctivae are normal. No scleral icterus. Neck: Normal range of motion. Neck supple. No JVD present. Cardiovascular: Normal rate, regular rhythm and intact distal pulses. Pulmonary/Chest: Effort normal. No respiratory distress. Musculoskeletal: Normal range of motion. Neurological: She is alert. Skin: Skin is warm and dry. Psychiatric: Judgment and thought content normal.   Affect full range mood congruent, mood is depressed and frustrated. No hallucinations psychosis. Slightly tangential thought process. Does best with closed ended questions. Nursing note and vitals reviewed.         Diagnostic Study Results   Labs -  No results found for this or any previous visit (from the past 12 hour(s)). Radiologic Studies -   No orders to display     No results found. Medications ordered:   Medications - No data to display      Medical Decision Making   Initial Medical Decision Making and DDx:  Patient complaining of homelessness and depression without suicidal ideation or disturbance. I discussed with crisis to him and he will pass it onto the neck shift at 7 PM.  Labs are pending. I doubt organic cause for the symptoms. ED Course: Progress Notes, Reevaluation, and Consults:     7:19 PM turned over to  [facility] at 7 PM shift change, patient awaiting crisis evaluation. I am the first provider for this patient. I reviewed the vital signs, available nursing notes, past medical history, past surgical history, family history and social history. Patient Vitals for the past 12 hrs:   Temp Pulse Resp BP SpO2   09/27/19 1648 97.9 °F (36.6 °C) (!) 111 18 (!) 133/92 99 %       Vital Signs-Reviewed the patient's vital signs. Pulse Oximetry Analysis, Cardiac Monitor, 12 lead ekg:     Interpreted by the EP. Records Reviewed: Nursing notes reviewed (Time of Review: 5:59 PM)    Procedures:   Critical Care Time:   Aspirin: (was aspirin given for stroke?)    Diagnosis     Clinical Impression: No diagnosis found. Disposition:       Follow-up Information    None          Patient's Medications   Start Taking    No medications on file   Continue Taking    HYDROCHLOROTHIAZIDE (HYDRODIURIL) 25 MG TABLET    Take 1 Tab by mouth daily. LIDOCAINE 5 % TOPICAL CREAM    Apply  to affected area two (2) times daily as needed for Itching. MULTIVITAMIN (ONE A DAY) TABLET    Take 1 Tab by mouth daily. OMEGA-3 ACID ETHYL ESTERS (LOVAZA) 1 GRAM CAPSULE    Take 2 Caps by mouth two (2) times a day. TRAZODONE (DESYREL) 100 MG TABLET    Take 1 Tab by mouth nightly.    These Medications have changed    No medications on file   Stop Taking    No medications on file     _______________________________    Notes:    Sonya Diesel, MD using Dragon dictation      _______________________________
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
## 2019-09-28 NOTE — DISCHARGE INSTRUCTIONS
Patient Education        Depression and Chronic Disease: Care Instructions  Your Care Instructions    A chronic disease is one that you have for a long time. Some chronic diseases can be controlled, but they usually cannot be cured. Depression is common in people with chronic diseases, but it often goes unnoticed. Many people have concerns about seeking treatment for a mental health problem. You may think it's a sign of weakness, or you don't want people to know about it. It's important to overcome these reasons for not seeking treatment. Treating depression or anxiety is good for your health. Follow-up care is a key part of your treatment and safety. Be sure to make and go to all appointments, and call your doctor if you are having problems. It's also a good idea to know your test results and keep a list of the medicines you take. How can you care for yourself at home? Watch for symptoms of depression  The symptoms of depression are often subtle at first. You may think they are caused by your disease rather than depression. Or you may think it is normal to be depressed when you have a chronic disease. If you are depressed you may:  · Feel sad or hopeless. · Feel guilty or worthless. · Not enjoy the things you used to enjoy. · Feel hopeless, as though life is not worth living. · Have trouble thinking or remembering. · Have low energy, and you may not eat or sleep well. · Pull away from others. · Think often about death or killing yourself. (Keep the numbers for these national suicide hotlines: 9-043-029-TALK [1-851.798.3434] and 9-715-AQPWADV [1-462.180.7273]. )  Get treatment  By treating your depression, you can feel more hopeful and have more energy. If you feel better, you may take better care of yourself, so your health may improve. · Talk to your doctor if you have any changes in mood during treatment for your disease. · Ask your doctor for help.  Counseling, antidepressant medicine, or a combination of the two can help most people with depression. Often a combination works best. Counseling can also help you cope with having a chronic disease. When should you call for help? Call 911 anytime you think you may need emergency care. For example, call if:    · You feel like hurting yourself or someone else.     · Someone you know has depression and is about to attempt or is attempting suicide.   [facility] your doctor now or seek immediate medical care if:    · You hear voices.     · Someone you know has depression and:  ? Starts to give away his or her possessions. ? Uses illegal drugs or drinks alcohol heavily. ? Talks or writes about death, including writing suicide notes or talking about guns, knives, or pills. ? Starts to spend a lot of time alone. ? Acts very aggressively or suddenly appears calm.    Watch closely for changes in your health, and be sure to contact your doctor if:    · You do not get better as expected. Where can you learn more? Go to http://roxy-britta.info/. Enter Q087 in the search box to learn more about \"Depression and Chronic Disease: Care Instructions. \"  Current as of: May 28, 2019  Content Version: 12.2  © 6185-5098 [employer]. Care instructions adapted under license by [employer] (which disclaims liability or warranty for this information). If you have questions about a medical condition or this instruction, always ask your healthcare professional. [place] [address] any warranty or liability for your use of this information.

## 2019-09-28 NOTE — ED NOTES
Patient yelling that she doesn't want to leave. Security called and escorted patient out of the department.

## 2019-09-28 NOTE — ED NOTES
Patient refusing to stay in her room without a TV while waiting for psychiatry to come and talk to her.  Patient sitting in quick care waiting room

## 2019-09-28 NOTE — ED NOTES
1900 -received signout from Dr. Sarwat Weaver. Patient medically clear for evaluation. [facility] has evaluated the patient and determined the patient is safe for discharge home does not warrant inpatient admission. I have educated the patient on this so understands. Patient will be discharged home at this time.

## 2019-09-28 NOTE — ED NOTES
Patient ambulated to the bathroom and stated \"ana in my chart that I shit out everything that I ate\"  Patient provided 2 warm blankets.

## 2019-09-28 NOTE — ED NOTES
I have reviewed discharge instructions with the patient. The patient verbalized understanding  Reviewed discharge instructions with patient and patient verbalized understanding of all instructions. Patient signed paper discharge instructions due to computer signature pad unavailable at this time.

## 2019-10-03 ENCOUNTER — HOSPITAL ENCOUNTER (INPATIENT)
Age: 54
LOS: 6 days | Discharge: HOME | DRG: 751 | End: 2019-10-09
Attending: EMERGENCY MEDICINE | Admitting: PSYCHIATRY & NEUROLOGY
Payer: MEDICAID

## 2019-10-03 DIAGNOSIS — R45.851 SUICIDAL IDEATION: Primary | ICD-10-CM

## 2019-10-03 DIAGNOSIS — F32.A DEPRESSION, UNSPECIFIED DEPRESSION TYPE: ICD-10-CM

## 2019-10-03 LAB
AMPHET UR QL SCN: NEGATIVE
ANION GAP SERPL CALC-SCNC: 9 MMOL/L (ref 3–18)
APPEARANCE UR: CLEAR
BARBITURATES UR QL SCN: NEGATIVE
BASOPHILS # BLD: 0 K/UL (ref 0–0.1)
BASOPHILS NFR BLD: 1 % (ref 0–2)
BENZODIAZ UR QL: NEGATIVE
BILIRUB UR QL: NEGATIVE
BUN SERPL-MCNC: 10 MG/DL (ref 7–18)
BUN/CREAT SERPL: 8 (ref 12–20)
CALCIUM SERPL-MCNC: 9.7 MG/DL (ref 8.5–10.1)
CANNABINOIDS UR QL SCN: NEGATIVE
CHLORIDE SERPL-SCNC: 99 MMOL/L (ref 100–111)
CO2 SERPL-SCNC: 28 MMOL/L (ref 21–32)
COCAINE UR QL SCN: NEGATIVE
COLOR UR: YELLOW
CREAT SERPL-MCNC: 1.29 MG/DL (ref 0.6–1.3)
DIFFERENTIAL METHOD BLD: ABNORMAL
EOSINOPHIL # BLD: 0 K/UL (ref 0–0.4)
EOSINOPHIL NFR BLD: 1 % (ref 0–5)
ERYTHROCYTE [DISTWIDTH] IN BLOOD BY AUTOMATED COUNT: 14.4 % (ref 11.6–14.5)
ETHANOL SERPL-MCNC: <3 MG/DL (ref 0–3)
GLUCOSE SERPL-MCNC: 114 MG/DL (ref 74–99)
GLUCOSE UR STRIP.AUTO-MCNC: NEGATIVE MG/DL
HCG UR QL: NEGATIVE
HCT VFR BLD AUTO: 34.4 % (ref 35–45)
HDSCOM,HDSCOM: NORMAL
HGB BLD-MCNC: 11.9 G/DL (ref 12–16)
HGB UR QL STRIP: NEGATIVE
KETONES UR QL STRIP.AUTO: NEGATIVE MG/DL
LEUKOCYTE ESTERASE UR QL STRIP.AUTO: NEGATIVE
LYMPHOCYTES # BLD: 0.9 K/UL (ref 0.9–3.6)
LYMPHOCYTES NFR BLD: 16 % (ref 21–52)
MCH RBC QN AUTO: 38.3 PG (ref 24–34)
MCHC RBC AUTO-ENTMCNC: 34.6 G/DL (ref 31–37)
MCV RBC AUTO: 110.6 FL (ref 74–97)
METHADONE UR QL: NEGATIVE
MONOCYTES # BLD: 0.4 K/UL (ref 0.05–1.2)
MONOCYTES NFR BLD: 7 % (ref 3–10)
NEUTS SEG # BLD: 4.3 K/UL (ref 1.8–8)
NEUTS SEG NFR BLD: 75 % (ref 40–73)
NITRITE UR QL STRIP.AUTO: NEGATIVE
OPIATES UR QL: NEGATIVE
PCP UR QL: NEGATIVE
PH UR STRIP: 6.5 [PH] (ref 5–8)
PLATELET # BLD AUTO: 260 K/UL (ref 135–420)
PMV BLD AUTO: 10.3 FL (ref 9.2–11.8)
POTASSIUM SERPL-SCNC: 3.1 MMOL/L (ref 3.5–5.5)
PROT UR STRIP-MCNC: NEGATIVE MG/DL
RBC # BLD AUTO: 3.11 M/UL (ref 4.2–5.3)
SODIUM SERPL-SCNC: 136 MMOL/L (ref 136–145)
SP GR UR REFRACTOMETRY: 1.01 (ref 1–1.03)
UROBILINOGEN UR QL STRIP.AUTO: 0.2 EU/DL (ref 0.2–1)
WBC # BLD AUTO: 5.7 K/UL (ref 4.6–13.2)

## 2019-10-03 PROCEDURE — 96365 THER/PROPH/DIAG IV INF INIT: CPT

## 2019-10-03 PROCEDURE — 74011250637 HC RX REV CODE- 250/637: Performed by: PHYSICIAN ASSISTANT

## 2019-10-03 PROCEDURE — 74011000250 HC RX REV CODE- 250: Performed by: PHYSICIAN ASSISTANT

## 2019-10-03 PROCEDURE — 80048 BASIC METABOLIC PNL TOTAL CA: CPT

## 2019-10-03 PROCEDURE — 81025 URINE PREGNANCY TEST: CPT

## 2019-10-03 PROCEDURE — 81003 URINALYSIS AUTO W/O SCOPE: CPT

## 2019-10-03 PROCEDURE — 80307 DRUG TEST PRSMV CHEM ANLYZR: CPT

## 2019-10-03 PROCEDURE — 74011250637 HC RX REV CODE- 250/637: Performed by: PSYCHIATRY & NEUROLOGY

## 2019-10-03 PROCEDURE — 85025 COMPLETE CBC W/AUTO DIFF WBC: CPT

## 2019-10-03 PROCEDURE — 99285 EMERGENCY DEPT VISIT HI MDM: CPT

## 2019-10-03 PROCEDURE — 74011250636 HC RX REV CODE- 250/636: Performed by: PHYSICIAN ASSISTANT

## 2019-10-03 PROCEDURE — 65220000003 HC RM SEMIPRIVATE PSYCH

## 2019-10-03 RX ORDER — POTASSIUM CHLORIDE 20 MEQ/1
40 TABLET, EXTENDED RELEASE ORAL
Status: COMPLETED | OUTPATIENT
Start: 2019-10-03 | End: 2019-10-03

## 2019-10-03 RX ORDER — LISINOPRIL 20 MG/1
20 TABLET ORAL DAILY
Status: DISCONTINUED | OUTPATIENT
Start: 2019-10-04 | End: 2019-10-09 | Stop reason: HOSPADM

## 2019-10-03 RX ORDER — HYDROCHLOROTHIAZIDE 25 MG/1
25 TABLET ORAL DAILY
Status: DISCONTINUED | OUTPATIENT
Start: 2019-10-04 | End: 2019-10-09 | Stop reason: HOSPADM

## 2019-10-03 RX ORDER — GLUCOSAM/CHONDRO/HERB 149/HYAL 750-100 MG
1 TABLET ORAL 2 TIMES DAILY WITH MEALS
Status: DISCONTINUED | OUTPATIENT
Start: 2019-10-04 | End: 2019-10-09 | Stop reason: HOSPADM

## 2019-10-03 RX ORDER — HYDROXYZINE PAMOATE 50 MG/1
50 CAPSULE ORAL
Status: DISCONTINUED | OUTPATIENT
Start: 2019-10-03 | End: 2019-10-03 | Stop reason: SDUPTHER

## 2019-10-03 RX ORDER — TRAZODONE HYDROCHLORIDE 100 MG/1
100 TABLET ORAL
Status: DISCONTINUED | OUTPATIENT
Start: 2019-10-03 | End: 2019-10-04

## 2019-10-03 RX ORDER — IBUPROFEN 600 MG/1
600 TABLET ORAL
Status: DISCONTINUED | OUTPATIENT
Start: 2019-10-03 | End: 2019-10-09 | Stop reason: HOSPADM

## 2019-10-03 RX ORDER — HYDROXYZINE PAMOATE 50 MG/1
50 CAPSULE ORAL
Status: DISCONTINUED | OUTPATIENT
Start: 2019-10-03 | End: 2019-10-09 | Stop reason: HOSPADM

## 2019-10-03 RX ORDER — HYDROXYZINE 25 MG/1
50 TABLET, FILM COATED ORAL
Status: COMPLETED | OUTPATIENT
Start: 2019-10-03 | End: 2019-10-03

## 2019-10-03 RX ADMIN — FOLIC ACID: 5 INJECTION, SOLUTION INTRAMUSCULAR; INTRAVENOUS; SUBCUTANEOUS at 12:01

## 2019-10-03 RX ADMIN — TRAZODONE HYDROCHLORIDE 100 MG: 100 TABLET ORAL at 23:03

## 2019-10-03 RX ADMIN — HYDROXYZINE HYDROCHLORIDE 50 MG: 25 TABLET, FILM COATED ORAL at 12:01

## 2019-10-03 RX ADMIN — POTASSIUM CHLORIDE 40 MEQ: 20 TABLET, EXTENDED RELEASE ORAL at 22:04

## 2019-10-03 RX ADMIN — HYDROXYZINE PAMOATE 50 MG: 25 CAPSULE ORAL at 19:31

## 2019-10-03 NOTE — ED PROVIDER NOTES
EMERGENCY DEPARTMENT HISTORY AND PHYSICAL EXAM    Date: 10/3/2019  Patient Name: Yordy Hernandez    History of Presenting Illness     Chief Complaint   Patient presents with   [address] Problem         History Provided By: Patient    Chief Complaint: Alcohol intoxication, depression,  suicidal ideation  Duration: Days  Timing: Worsening  Location: N/A  Quality: Depressed  Severity: Severe  Modifying Factors: None  Associated Symptoms: none       Additional History (Context): Yordy Hernandez is a 53 y.o. female with a history of hypertension and high cholesterol who presents today for alcohol intoxication, depression and suicidal ideation. Patient reports she has been depressed ever since her left one went to prison in 2011. Patient reports she has been homeless and sleeping on people's porches. Patient reports she was seen here in the emergency room recently and was escorted out by security, and states she was never evaluated for treatment. Patient reports she needs to \"be admitted into the mental hospital\". Patient reports suicidal ideation without plan. Denies HI or hallucinations. Patient reports she drank an entire large bottle of spice from yesterday and finished it off this morning prior to arrival.  Denies any drug or tobacco abuse. PCP: Lenin Wong MD    Current Outpatient Medications   Medication Sig Dispense Refill    lidocaine 5 % topical cream Apply  to affected area two (2) times daily as needed for Itching. 1 Tube 0    hydroCHLOROthiazide (HYDRODIURIL) 25 mg tablet Take 1 Tab by mouth daily. 30 Tab 0    traZODone (DESYREL) 100 mg tablet Take 1 Tab by mouth nightly. 30 Tab 1    omega-3 acid ethyl esters (LOVAZA) 1 gram capsule Take 2 Caps by mouth two (2) times a day. 360 Cap 3    multivitamin (ONE A DAY) tablet Take 1 Tab by mouth daily.          Past History     Past Medical History:  Past Medical History:   Diagnosis Date    Acute renal failure (ARF) ([facility] [address] 7/15/2019    Hypertension     Hyponatremia 7/15/2019    Other ill-defined conditions(799.89)     high cholesterol    Psychiatric disorder        Past Surgical History:  No past surgical history on file. Family History:  Family History   Problem Relation Age of Onset   Ava Stroke Mother     Diabetes Mother     Hypertension Father     Diabetes Father     Cancer Paternal Uncle        Social History:  Social History     Tobacco Use    Smoking status: Never Smoker    Smokeless tobacco: Never Used   Substance Use Topics    Alcohol use: Yes    Drug use: No       Allergies: Allergies   Allergen Reactions    Sulfa (Sulfonamide Antibiotics) Rash         Review of Systems   Review of Systems   Constitutional: Negative for chills and fever. HENT: Negative for congestion, rhinorrhea and sore throat. Respiratory: Negative for cough and shortness of breath. Cardiovascular: Negative for chest pain. Gastrointestinal: Negative for abdominal pain, blood in stool, constipation, diarrhea, nausea and vomiting. Genitourinary: Negative for dysuria, frequency and hematuria. Musculoskeletal: Negative for back pain and myalgias. Skin: Negative for rash and wound. Neurological: Negative for dizziness and headaches. Psychiatric/Behavioral: Positive for suicidal ideas. Depression   All other systems reviewed and are negative. All Other Systems Negative  Physical Exam     Vitals:    10/03/19 1015 10/03/19 1405   BP: (!) 148/95 132/82   Pulse: (!) 108 95   Resp: 18 18   Temp: 97.6 °F (36.4 °C) 97.7 °F (36.5 °C)   SpO2: 98% 100%   Weight: 72.6 kg (160 lb 1 oz)      Physical Exam   Constitutional: She is oriented to person, place, and time. She appears well-developed and well-nourished. No distress. HENT:   Head: Normocephalic and atraumatic. Eyes: Conjunctivae are normal.   Neck: Normal range of motion. Neck supple. Cardiovascular: Normal rate, regular rhythm and normal heart sounds.    Pulmonary/Chest: Effort normal and breath sounds normal. No respiratory distress. She exhibits no tenderness. Abdominal: Soft. Bowel sounds are normal. She exhibits no distension. There is no tenderness. There is no rebound and no guarding. Musculoskeletal: She exhibits no edema or deformity. Neurological: She is alert and oriented to person, place, and time. She has normal reflexes. Skin: Skin is warm and dry. She is not diaphoretic. Psychiatric: Judgment normal. Her affect is angry. Her speech is rapid and/or pressured. She is agitated and aggressive. Cognition and memory are normal. She exhibits a depressed mood. She expresses suicidal ideation. She expresses no homicidal ideation. She expresses no suicidal plans and no homicidal plans. Nursing note and vitals reviewed.         Diagnostic Study Results     Labs -     Recent Results (from the past 12 hour(s))   URINALYSIS W/ RFLX MICROSCOPIC    Collection Time: 10/03/19 10:47 AM   Result Value Ref Range    Color YELLOW      Appearance CLEAR      Specific gravity 1.008 1.005 - 1.030      pH (UA) 6.5 5.0 - 8.0      Protein NEGATIVE  NEG mg/dL    Glucose NEGATIVE  NEG mg/dL    Ketone NEGATIVE  NEG mg/dL    Bilirubin NEGATIVE  NEG      Blood NEGATIVE  NEG      Urobilinogen 0.2 0.2 - 1.0 EU/dL    Nitrites NEGATIVE  NEG      Leukocyte Esterase NEGATIVE  NEG     HCG URINE, QL    Collection Time: 10/03/19 10:47 AM   Result Value Ref Range    HCG urine, QL NEGATIVE  NEG     DRUG SCREEN, URINE    Collection Time: 10/03/19 10:47 AM   Result Value Ref Range    BENZODIAZEPINES NEGATIVE  NEG      BARBITURATES NEGATIVE  NEG      THC (TH-CANNABINOL) NEGATIVE  NEG      OPIATES NEGATIVE  NEG      PCP(PHENCYCLIDINE) NEGATIVE  NEG      COCAINE NEGATIVE  NEG      AMPHETAMINES NEGATIVE  NEG      METHADONE NEGATIVE  NEG      HDSCOM (NOTE)    CBC WITH AUTOMATED DIFF    Collection Time: 10/03/19 11:50 AM   Result Value Ref Range    WBC 5.7 4.6 - 13.2 K/uL    RBC 3.11 (L) 4.20 - 5.30 M/uL    HGB 11.9 (L) 12.0 - 16.0 g/dL    HCT 34.4 (L) 35.0 - 45.0 %    .6 (H) 74.0 - 97.0 FL    MCH 38.3 (H) 24.0 - 34.0 PG    MCHC 34.6 31.0 - 37.0 g/dL    RDW 14.4 11.6 - 14.5 %    PLATELET 397 053 - 364 K/uL    MPV 10.3 9.2 - 11.8 FL    NEUTROPHILS 75 (H) 40 - 73 %    LYMPHOCYTES 16 (L) 21 - 52 %    MONOCYTES 7 3 - 10 %    EOSINOPHILS 1 0 - 5 %    BASOPHILS 1 0 - 2 %    ABS. NEUTROPHILS 4.3 1.8 - 8.0 K/UL    ABS. LYMPHOCYTES 0.9 0.9 - 3.6 K/UL    ABS. MONOCYTES 0.4 0.05 - 1.2 K/UL    ABS. EOSINOPHILS 0.0 0.0 - 0.4 K/UL    ABS. BASOPHILS 0.0 0.0 - 0.1 K/UL    DF AUTOMATED     METABOLIC PANEL, BASIC    Collection Time: 10/03/19 11:50 AM   Result Value Ref Range    Sodium 136 136 - 145 mmol/L    Potassium 3.1 (L) 3.5 - 5.5 mmol/L    Chloride 99 (L) 100 - 111 mmol/L    CO2 28 21 - 32 mmol/L    Anion gap 9 3.0 - 18 mmol/L    Glucose 114 (H) 74 - 99 mg/dL    BUN 10 7.0 - 18 MG/DL    Creatinine 1.29 0.6 - 1.3 MG/DL    BUN/Creatinine ratio 8 (L) 12 - 20      GFR est AA 52 (L) >60 ml/min/1.73m2    GFR est non-AA 43 (L) >60 ml/min/1.73m2    Calcium 9.7 8.5 - 10.1 MG/DL   ETHYL ALCOHOL    Collection Time: 10/03/19 11:50 AM   Result Value Ref Range    ALCOHOL(ETHYL),SERUM <3 0 - 3 MG/DL       Radiologic Studies -   No orders to display     CT Results  (Last 48 hours)    None        CXR Results  (Last 48 hours)    None            Medical Decision Making   I am the first provider for this patient. I reviewed the vital signs, available nursing notes, past medical history, past surgical history, family history and social history. Vital Signs-Reviewed the patient's vital signs.       Records Reviewed: Nursing Notes and Old Medical Records     Procedures: None   Procedures    Provider Notes (Medical Decision Making):     Differential Diagnosis: substance abuse, alcohol intoxication, substance withdrawal, acute psychotic episode, anxiety, panic disorder, yue, undifferentiated schizophrenia, depression, SI, HI, medication non-compliance, other mood disorder      Plan: Have discussed at length with patient the importance of patient's while in the ED today. We will medically clear and consult crisis. Will order banana bag and sitter. 11:20 AM  Pt has become very angry and has been cussing at the staff over requests for her phone and purse. Discussed with patient she could make one phone call and then would need to kindly give her purse and phone to staff to be placed in a locker. She agrees. 1:25 PM  Pt resting comfortably. 4:41 PM  Pending UDS at this time. 5:03 PM  Trinh Yun agrees to consult. 6:45 PM : Pt care transferred to [facility]  ,ED provider. History of patient complaint(s), available diagnostic reports and current treatment plan has been discussed thoroughly. Bedside rounding on patient occured : no . Intended disposition of patient : TBD  Pending diagnostics reports and/or labs (please list): crisis eval     7:06 PM :Pt care assumed from Miguel Angel Ardon , ED provider. Pt complaint(s), current treatment plan, progression and available diagnostic results have been discussed thoroughly. Rounding occurred: no  Intended Disposition: TBD pending crisis eval    8:00PM Pt evaluated by Nichole Robertson, will admit here. MED RECONCILIATION:  No current facility-administered medications for this encounter. Current Outpatient Medications   Medication Sig    lidocaine 5 % topical cream Apply  to affected area two (2) times daily as needed for Itching.  hydroCHLOROthiazide (HYDRODIURIL) 25 mg tablet Take 1 Tab by mouth daily.  traZODone (DESYREL) 100 mg tablet Take 1 Tab by mouth nightly.  omega-3 acid ethyl esters (LOVAZA) 1 gram capsule Take 2 Caps by mouth two (2) times a day.  multivitamin (ONE A DAY) tablet Take 1 Tab by mouth daily. Disposition:  admit      Diagnosis     Clinical Impression:   1. Suicidal ideation    2.  Depression, unspecified depression type

## 2019-10-03 NOTE — ED NOTES
Patient is refusing to give her purse to staff to put in a locker. Pt is currently verbally aggressive. Physician notified. Patient's clothing placed in locker 4C.

## 2019-10-03 NOTE — ED NOTES
Sitter needed for patient. Nursing supervisor made aware and a sitter will be placed with the patient.

## 2019-10-03 NOTE — ED TRIAGE NOTES
\"I'm depressed\" pt. Admits to being SI and states \"right now, I've been drinking all night and I want to go to sleep. \" Pt. Finished her \"spiced rum\" sitting in triage.

## 2019-10-04 PROBLEM — F33.2 MDD (MAJOR DEPRESSIVE DISORDER), RECURRENT SEVERE, WITHOUT PSYCHOSIS (HCC): Status: ACTIVE | Noted: 2019-10-04

## 2019-10-04 PROCEDURE — 74011250637 HC RX REV CODE- 250/637: Performed by: PSYCHIATRY & NEUROLOGY

## 2019-10-04 PROCEDURE — 65220000003 HC RM SEMIPRIVATE PSYCH

## 2019-10-04 RX ORDER — CITALOPRAM 20 MG/1
20 TABLET, FILM COATED ORAL DAILY
Status: DISCONTINUED | OUTPATIENT
Start: 2019-10-04 | End: 2019-10-05

## 2019-10-04 RX ORDER — HYDROXYZINE PAMOATE 50 MG/1
50 CAPSULE ORAL
Status: DISCONTINUED | OUTPATIENT
Start: 2019-10-04 | End: 2019-10-09 | Stop reason: HOSPADM

## 2019-10-04 RX ORDER — POTASSIUM CHLORIDE 20 MEQ/1
20 TABLET, EXTENDED RELEASE ORAL DAILY
Status: COMPLETED | OUTPATIENT
Start: 2019-10-04 | End: 2019-10-06

## 2019-10-04 RX ADMIN — OMEGA-3 FATTY ACIDS CAP 1000 MG 1 CAPSULE: 1000 CAP at 16:50

## 2019-10-04 RX ADMIN — HYDROCHLOROTHIAZIDE 25 MG: 25 TABLET ORAL at 08:35

## 2019-10-04 RX ADMIN — OMEGA-3 FATTY ACIDS CAP 1000 MG 1 CAPSULE: 1000 CAP at 08:35

## 2019-10-04 RX ADMIN — MULTIPLE VITAMINS W/ MINERALS TAB 1 TABLET: TAB at 08:35

## 2019-10-04 RX ADMIN — CITALOPRAM HYDROBROMIDE 20 MG: 20 TABLET ORAL at 11:48

## 2019-10-04 RX ADMIN — POTASSIUM CHLORIDE 20 MEQ: 20 TABLET, EXTENDED RELEASE ORAL at 11:48

## 2019-10-04 RX ADMIN — LISINOPRIL 20 MG: 20 TABLET ORAL at 08:35

## 2019-10-04 RX ADMIN — HYDROXYZINE PAMOATE 50 MG: 50 CAPSULE ORAL at 21:00

## 2019-10-04 NOTE — PROGRESS NOTES
Problem: Suicide  Goal: *STG: Remains safe in hospital  Description  AEB remaining safe daily while hospitalized. Outcome: Progressing Towards Goal  Note:   Pt remains safe. Goal: *STG: Seeks staff when feelings of self harm or harm towards others arise  Description  AEB seeking staff when feelings of harm towards self/others arise, for each occurrence, daily while hospitalized. Outcome: Progressing Towards Goal  Note:   Pt contracts for safety. Patient has been in the milieu the entire shift. Patient is argumentative and entitled and knows no boundaries. Patient got very agitated when no one would stop what they were doing to turn the TV to the Price is Right. Patient repeatedly interrupted a discharge to ask about the TV. She got focused this morning on \"the head \" who she said escorts her out of the hospital for spending too much time in the cafeteria. Patient states that she just needs help with her depression but denies active SI/HI. Patient also denies AVH.

## 2019-10-04 NOTE — BH NOTES
Patient on unit agitated as soon as she arrived to unit. Patient was sarcastic and labile between calm to highly agitated. Patient disorganized and changed her mind frequently. Attempted to process pt belongings with patient, but patient became very agitated. Informed patient that all belongings will be placed in one bag and sent to storage for safe keeping. Patient refused to sign pt belonging sheet. Patient took her complimentary bags and condensed them into one and tossed the other one over the nurse's station. PRN medication reviewed with patient.

## 2019-10-04 NOTE — BH NOTES
Patient admitted to the unit is a 53 yr old female escorted to the unit by security via transport chair. Patient is being admitted due to patient having suicidal ideations, and depressed. The patient stated she finished a large bottle of rum this morning, patient BAL was <3 when the patient was in ER this morning. Patient is uncooperative, rude, loud and sarcastic doing nursing assessment. The patient stated that her boyfriend is in prison, she is homeless,and sleeps on different people porches, and unemployed. and that's why she is depressed. The patient threw a bag over the nurse station. Patient denies thoughts of suicide. The patient contracted for safety, patient denies hallucinations, patient denies delusion will continue to monitor. RNs will initiate, develop, implement, review or revise treatment plan.
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
## 2019-10-04 NOTE — BSMART NOTE
Comprehensive Assessment Form Part 1 Disposition Discussed with BRIAN Siddiqui, the plan is admit. The on-call Psychiatrist consulted was Dr. Savanah Mckeon. The admitting Diagnosis is Depression. Admitted to room 123-1 Unit: ADCD Integrated Summary Patient is a 53year old female who presented to the emergency room with c/o \"feeling depressed with suicidal thoughts. \" Patient verbalize that her sister is ill, no support, b/f is in assisted. I just need to talk to a psychiatrist.. Morgan Pate I need help. Yesterday was my birthday and I drank 1/2 small bottle of spiced rum. \" Patient denied thoughts of harm towards others denied hallucinations. Patient also denied appetite disturbance, but verbalized that she only sleeps about \"couple hours/night. \" Patient also verbalized that she is homeless. Patient stated that stressors include \"homelessness, no support, boyfriend is in assisted. \" 
 
Mental Status Exam 
 
The patient's appearance shows no evidence of impairment. The patient's behavior is calm, cooperative. The patient is oriented to time, place, person and situation. The patient's speech is soft. The patient's mood is depressed. The range of affect is flat. The patient's thought content demonstrates no evidence of impairment. The thought process shows no evidence of impairment. The patient's perception shows no evidence of impairment. The patient's memory shows no evidence of impairment. The patient's appetite shows no evidence of impairment. The patient's sleep is \"decreased. \" Inpatient: \"[facility], 2015\" Outpatient: \"None\" Legal Issues: Denied Access to weapons: Denied Contact/Support Person: Dorian Agudelo, brother, [phone]\" The patient is deemed competent to provide informed consent. The Chief Complaint is \"depressed, suicidal thoughts. \" The Precipitant Factors are chronicity, \"homelessness, no support, boyfriend in assisted. \" 
 
Substance Abuse The patient is using substances. The patient is using alcohol \"every once in awhile. \" The patient denied withdrawal symptoms, denied use of illicit drugs. Patient is receptive to voluntary admission for treatment at [facility]; discussed with on-call psychiatrist, admission orders received and report called to charge nurse.  
 
Elliot Mead RN, BSN

## 2019-10-04 NOTE — GROUP NOTE
[facility]  GROUP DOCUMENTATION INDIVIDUAL Group Therapy Note Date: October 4 Group Start Time: 1400 Group End Time: 7301 Group Topic: Nursing [facility] 1 ADULT CHEM DEP Bing Shell RN Additional Notes:  Patient attended group.   
 
Elizabeth Davalos RN

## 2019-10-04 NOTE — BSMART NOTE
ART THERAPY GROUP PROGRESS NOTE PATIENT SCHEDULED FOR GROUP AT: 3634 ATTENDANCE: Full PARTICIPATION LEVEL: Participates fully in the art process. ATTENTION LEVEL: Able to focus on task. FOCUS: Gratitude SYMBOLIC & THEMATIC CONTENT AS NOTED IN IMAGERY: She presented with a calm mood and blunted affect and her thought processes were tangential and consistent with suspected delusional content. She was actively engaged in the art making directive, she worked quickly and her approach to task was intentional. Thematic content reflected on her fiance that she reported as being in correction for being wrongfully accused of murder. She dominated discussion with art therapist for entirety of group and was unaware of other group members. She told tangential stories that were grandiose in nature focused on expensive elaborate vacations her fiance would take her on and how much money he has spent on her. She did not respond well to redirection from art therapist and continued to tell stories throughout the group.

## 2019-10-05 LAB
ALBUMIN SERPL-MCNC: 2.7 G/DL (ref 3.4–5)
ALBUMIN/GLOB SERPL: 0.8 {RATIO} (ref 0.8–1.7)
ALP SERPL-CCNC: 97 U/L (ref 45–117)
ALT SERPL-CCNC: 125 U/L (ref 13–56)
AST SERPL-CCNC: 235 U/L (ref 10–38)
BILIRUB DIRECT SERPL-MCNC: 0.2 MG/DL (ref 0–0.2)
BILIRUB SERPL-MCNC: 0.5 MG/DL (ref 0.2–1)
CHOLEST SERPL-MCNC: 190 MG/DL
GLOBULIN SER CALC-MCNC: 3.5 G/DL (ref 2–4)
HBA1C MFR BLD: 5.3 % (ref 4.2–5.6)
HDLC SERPL-MCNC: 53 MG/DL (ref 40–60)
HDLC SERPL: 3.6 {RATIO} (ref 0–5)
LDLC SERPL CALC-MCNC: 118.8 MG/DL (ref 0–100)
LIPID PROFILE,FLP: ABNORMAL
PROT SERPL-MCNC: 6.2 G/DL (ref 6.4–8.2)
TRIGL SERPL-MCNC: 91 MG/DL (ref ?–150)
TSH SERPL DL<=0.05 MIU/L-ACNC: 1.75 UIU/ML (ref 0.36–3.74)
VLDLC SERPL CALC-MCNC: 18.2 MG/DL

## 2019-10-05 PROCEDURE — 80076 HEPATIC FUNCTION PANEL: CPT

## 2019-10-05 PROCEDURE — 74011250637 HC RX REV CODE- 250/637: Performed by: PSYCHIATRY & NEUROLOGY

## 2019-10-05 PROCEDURE — 83036 HEMOGLOBIN GLYCOSYLATED A1C: CPT

## 2019-10-05 PROCEDURE — 36415 COLL VENOUS BLD VENIPUNCTURE: CPT

## 2019-10-05 PROCEDURE — 80061 LIPID PANEL: CPT

## 2019-10-05 PROCEDURE — 65220000003 HC RM SEMIPRIVATE PSYCH

## 2019-10-05 PROCEDURE — 84443 ASSAY THYROID STIM HORMONE: CPT

## 2019-10-05 RX ORDER — CITALOPRAM 20 MG/1
20 TABLET, FILM COATED ORAL
Status: DISCONTINUED | OUTPATIENT
Start: 2019-10-06 | End: 2019-10-09 | Stop reason: HOSPADM

## 2019-10-05 RX ADMIN — OMEGA-3 FATTY ACIDS CAP 1000 MG 1 CAPSULE: 1000 CAP at 17:59

## 2019-10-05 RX ADMIN — OMEGA-3 FATTY ACIDS CAP 1000 MG 1 CAPSULE: 1000 CAP at 08:57

## 2019-10-05 RX ADMIN — CITALOPRAM HYDROBROMIDE 20 MG: 20 TABLET ORAL at 08:57

## 2019-10-05 RX ADMIN — HYDROXYZINE PAMOATE 50 MG: 50 CAPSULE ORAL at 21:07

## 2019-10-05 RX ADMIN — POTASSIUM CHLORIDE 20 MEQ: 20 TABLET, EXTENDED RELEASE ORAL at 08:57

## 2019-10-05 RX ADMIN — LISINOPRIL 20 MG: 20 TABLET ORAL at 08:57

## 2019-10-05 RX ADMIN — HYDROCHLOROTHIAZIDE 25 MG: 25 TABLET ORAL at 08:57

## 2019-10-05 NOTE — PROGRESS NOTES
The pt was seen in psych rounds today, case discussed with staff. During session today, she described being somewhat drowsy, this being related to current tx with citalopram. The pt was advised about a switch of the SSRI to night time, beginning tomorrow night, since she took her morning dose today. She agreed to the switch. 24-Hr Vitals/Weight (last day)     Date/Time Temp Pulse BP MAP (Calculated) BP 1 Location BP Patient Position Resp SpO2 O2 Device O2 Flow Rate (L/min) Level of Consciousness MEWS Score Weight   10/05/19 0927 98 °F (36.7 °C) 76 145/92Abnormal  110 Right arm Sitting 18    Alert 1    10/04/19 0839 98 °F (36.7 °C) 96 127/85 99 Left arm At rest 17    Alert 1      Vitals are stable. Mild elevation of her BP noticed today. Otherwise there is not evidence of her having any other meds induced side effects. Will see again tomorrow.

## 2019-10-05 NOTE — GROUP NOTE
IP  GROUP DOCUMENTATION INDIVIDUAL Group Therapy Note Date: October 5 Group Start Time: 3137 Group End Time: 7618 Group Topic: Nursing [facility] 1 ADULT CHEM Kiki Hargrove RN 
 
[facility]  GROUP DOCUMENTATION GROUP Group Therapy Note Attendees: 4 Attendance: Did not attend Rashida Askew RN

## 2019-10-05 NOTE — GROUP NOTE
[facility]  GROUP DOCUMENTATION INDIVIDUAL Group Therapy Note Date: October 4 Group Start Time: 2000 Group End Time: 2015 Group Topic: Nursing [facility] 1 ADULT CHEM DEP Debra Stern; Crew, Nubia SMITH  GROUP DOCUMENTATION GROUP Group Therapy Note Attendees: 5 Attendance: Attended Interventions/techniques: Informed Follows Directions: Followed directions Interactions: Interacted appropriately Mental Status: Calm Behavior/appearance: Attentive Goals Achieved: Able to listen to others Starr Barbosa

## 2019-10-05 NOTE — PROGRESS NOTES
Problem: Suicide  Goal: *STG: Remains safe in hospital  Description  AEB remaining safe daily while hospitalized. Outcome: Progressing Towards Goal  Note:   Patient will remain safe in the hospital daily  Goal: *STG: Attends activities and groups  Description  AEB attending at least 3-4 groups/activities daily while hospitalized. Outcome: Progressing Towards Goal  Note:   Patient will attend scheduled activities and groups daily  Goal: *STG/LTG: Complies with medication therapy  Description  AEB taking all medication as prescribed daily while hospitalized. Outcome: Progressing Towards Goal  Note:   Patient will comply with medication therapy daily    Patient has been visible and active on the unit during this shift. She remains entitled, demanding, and labile at times when conversating with staff and peers. She is quick to get defensive and can almost become argumentative. However, redirectable. She is complaining about unit protocol and medications, but medication compliant. Ate meals and attended community group. She refused Rn group outside. Denies current thoughts of self harm and/or SI. Focussed on whether or not she will be able to get her bike back from [address].  She is going to follow-up with her brother regarding her concern. Will continue to monitor for safety and support.

## 2019-10-06 PROCEDURE — 74011250637 HC RX REV CODE- 250/637: Performed by: PSYCHIATRY & NEUROLOGY

## 2019-10-06 PROCEDURE — 65220000003 HC RM SEMIPRIVATE PSYCH

## 2019-10-06 RX ORDER — PRAVASTATIN SODIUM 20 MG/1
20 TABLET ORAL DAILY
Status: DISCONTINUED | OUTPATIENT
Start: 2019-10-07 | End: 2019-10-09 | Stop reason: HOSPADM

## 2019-10-06 RX ADMIN — HYDROCHLOROTHIAZIDE 25 MG: 25 TABLET ORAL at 08:33

## 2019-10-06 RX ADMIN — HYDROXYZINE PAMOATE 50 MG: 50 CAPSULE ORAL at 22:03

## 2019-10-06 RX ADMIN — CITALOPRAM HYDROBROMIDE 20 MG: 20 TABLET ORAL at 21:05

## 2019-10-06 RX ADMIN — POTASSIUM CHLORIDE 20 MEQ: 20 TABLET, EXTENDED RELEASE ORAL at 08:33

## 2019-10-06 RX ADMIN — OMEGA-3 FATTY ACIDS CAP 1000 MG 1 CAPSULE: 1000 CAP at 08:33

## 2019-10-06 RX ADMIN — LISINOPRIL 20 MG: 20 TABLET ORAL at 08:33

## 2019-10-06 NOTE — GROUP NOTE
[facility] GROUP DOCUMENTATION INDIVIDUAL Group Therapy Note Date: October 6 Group Start Time: 1300 Group End Time: 0423 Group Topic: Recreational/Music Therapy [address] 1 ADULT CHEM Kiki Hargrove RN 
 
[facility] GROUP DOCUMENTATION GROUP Group Therapy Note Attendees: 6 Attendance: Attended Patient's Goal:  Recreational 
 
Interventions/techniques: Supported Follows Directions: Followed directions Interactions: Interacted appropriately Mental Status: Calm Behavior/appearance: Attentive Goals Achieved: Able to engage in interactions Additional Notes:  
 
Birdie Rai RN

## 2019-10-06 NOTE — GROUP NOTE
LUIS  GROUP DOCUMENTATION INDIVIDUAL Group Therapy Note Date: October 5 Group Start Time: 2000 Group End Time: 2015 Group Topic: Nursing [facility] 1 ADULT CHEM DEP Jose SMITH  GROUP DOCUMENTATION GROUP Group Therapy Note Attendees: 7 Attendance: Attended Interventions/techniques: Informed Follows Directions: Followed directions Interactions: Interacted appropriately Mental Status: Congruent Behavior/appearance: Cooperative Goals Achieved: Able to listen to others Deborah Cavazos

## 2019-10-06 NOTE — PROGRESS NOTES
Problem: Suicide  Goal: *STG: Remains safe in hospital  Description  AEB remaining safe daily while hospitalized. Outcome: Progressing Towards Goal  Note:   Patient will remain safe in the hospital daily  Goal: *STG: Attends activities and groups  Description  AEB attending at least 3-4 groups/activities daily while hospitalized. Outcome: Progressing Towards Goal  Note:   Patient will attend scheduled activities and groups daily  Goal: *STG/LTG: Complies with medication therapy  Description  AEB taking all medication as prescribed daily while hospitalized. Outcome: Progressing Towards Goal  Note:   Patient will comply with medications as scheduled. Patient has been visible and active on the unit during this shift. Pt has been compliant with medications, meals, and vital signs. She has been interactive with peers and staff. Continues to present in an entitled way, but cooperative with unit milieu. Pt cholesterol medication added to her regimen per MD. She continues to appear irritable and depressed, but denies current thoughts of SI and/or self harm. Will continue to monitor for safety and support.

## 2019-10-06 NOTE — BH NOTES
GROUP THERAPY PROGRESS NOTE    Kristan Garcia Lip[place] participating in [place].      Group time: 30 minutes    Personal goal for participation: Staff Introduction - Unit Orientation - Q & A    Goal orientation: community    Group therapy participation: active    Impression of participation: Fully participated in group

## 2019-10-07 PROCEDURE — 74011250637 HC RX REV CODE- 250/637: Performed by: PSYCHIATRY & NEUROLOGY

## 2019-10-07 PROCEDURE — 65220000003 HC RM SEMIPRIVATE PSYCH

## 2019-10-07 RX ADMIN — LISINOPRIL 20 MG: 20 TABLET ORAL at 08:22

## 2019-10-07 RX ADMIN — CITALOPRAM HYDROBROMIDE 20 MG: 20 TABLET ORAL at 20:33

## 2019-10-07 RX ADMIN — HYDROCHLOROTHIAZIDE 25 MG: 25 TABLET ORAL at 08:22

## 2019-10-07 RX ADMIN — PRAVASTATIN SODIUM 20 MG: 20 TABLET ORAL at 08:22

## 2019-10-07 NOTE — BSMART NOTE
ART THERAPY GROUP PROGRESS NOTE Group time:1320 The patient did not awaken/get up when called for group.

## 2019-10-07 NOTE — BSMART NOTE
Pt. is a 53year old homeless female with history of Bipolar Disorder and Alcohol Dependence. Pt. was admitted the hospital for ideations harm self. Pt.s case was discussed with the treating psychiatrist.   
  
 Contact:  ANJU met with the pt.  to discuss dc planning. Pt. expressed she is okay and medications seems to be working. Pt. reports she is interested in the [facility].  ANJU explained to the pt , she will needs to get  enrolled in outpt mental health services with [facility] in order to benefit form the shelter plans care program.  SW provide pt with information on an application for the program.   ANJU also encouraged pt to explore returning to the shelter until she is able to get into the program. ANJU provided pt. with positive feedback for her compliance. Pt. is cooperative, not labile, and has fair insight. ANJU will continue assist pt with dc planning

## 2019-10-07 NOTE — BSMART NOTE
OCCUPATIONAL THERAPY PROGRESS NOTE Group Time:  1892 Attendance: The patient attended full group. Participation: The patient participated fully in the activity. Attention: The patient was able to focus on the activity. Interaction: The patient occasionally  interacts with others. Able to ID change to work on with help needed. Seems somewhat grandiose at times. States she is getting  and when asked \"when?'  Tells of plans to  a man who is in long term \"for life + 2 years\" and belief he will get out.

## 2019-10-07 NOTE — GROUP NOTE
[facility]  GROUP DOCUMENTATION INDIVIDUAL Group Therapy Note Date: October 6 Group Start Time: 1900 Group End Time: 1930 Group Topic: Nursing [facility] 1 ADULT CHEM DEP Micah Marquez, RN 
 
[facility] GROUP DOCUMENTATION GROUP Group Therapy Note Attendees: 6 Attendance: Attended Interventions/techniques: Challenged, Informed and Validated Follows Directions: Followed directions Interactions: Interacted appropriately Mental Status: Calm Behavior/appearance: Attentive Goals Achieved: Able to engage in interactions Jef Bennett.  Freda Starkey

## 2019-10-07 NOTE — PROGRESS NOTES
Problem: Suicide  Goal: *STG: Remains safe in hospital  Description  AEB remaining safe daily while hospitalized. Outcome: Progressing Towards Goal  Goal: *STG: Seeks staff when feelings of self harm or harm towards others arise  Description  AEB seeking staff when feelings of harm towards self/others arise, for each occurrence, daily while hospitalized. Outcome: Progressing Towards Goal  Goal: *STG: Attends activities and groups  Description  AEB attending at least 3-4 groups/activities daily while hospitalized. Outcome: Progressing Towards Goal  Goal: *STG:  Verbalizes alternative ways of dealing with maladaptive feelings/behaviors  Description  AEB verbalizing at least 3 alternative ways of dealing with maladaptive feelings/behaviors daily while hospitalized. Outcome: Progressing Towards Goal  Goal: *STG/LTG: Complies with medication therapy  Description  AEB taking all medication as prescribed daily while hospitalized. Outcome: Progressing Towards Goal  Goal: Interventions  Outcome: Progressing Towards Goal     Problem: Falls - Risk of  Goal: *Absence of Falls  Description  Document Era Liu Fall Risk and appropriate interventions in the flowsheet. AEB remaining free of falls daily while hospitalized. Outcome: Progressing Towards Goal  Note:   Fall Risk Interventions:            Medication Interventions: Teach patient to arise slowly                   Problem: Hypertension  Goal: *Blood pressure within specified parameters  Description  AEB maintaining blood pressures within the parameters based on hospital policy daily while hospitalized. Outcome: Progressing Towards Goal   Pt is angry and irritable. She refused her fish oil and vitamin this morning. This nurse had told her that her cholesterol medication was one of the morning meds being given and she stated if they would just give me my damn cholesterol I have a whole bottle.  Then she pointed to the [facility] stating you have all the medicines in there. Explained to pt that was one of her morning meds again. She stated she was told she could not get numbers from her phone. She was given her phone and was able to get numbers she needed. When asked about why she was here she angrily stated being homeless makes you depressed and suicidal. Pt denies any thoughts of harming self or others today. She did attend groups this morning.

## 2019-10-07 NOTE — BH NOTES
Patient in room most of shift. Compliant with medications as offered. PRN vistaril given x1 for anxiety. Denies SI/HI/ AH. Contracts for safety on unit. Will continue to monitor for safety.

## 2019-10-07 NOTE — PROGRESS NOTES
The pt was seen individually and her case was discussed with staff. Not as angry today, she remains entitled at times, this being mentioned due to need to be aware of counter transference issues. Otherwise, she is tolerating meds well. 24-Hr Vitals/Weight (last day)     Date/Time Temp Pulse BP MAP (Calculated) BP 1 Location BP Patient Position Resp SpO2 O2 Device O2 Flow Rate (L/min) Level of Consciousness MEWS Score Weight   10/07/19 1253 98.6 °F (37 °C) 80 114/75 88   18    Alert 1    10/07/19 0840 98.6 °F (37 °C) 84 143/101Abnormal  115 Right arm At rest 18    Alert 1    10/06/19 0754 96.5 °F (35.8 °C) 74 140/98Abnormal  112 Right arm Sitting 16    Alert 1      BP improved after she took her anti hypertensives. The note from her IP  is appreciated. Will discharge tomorrow if stable.

## 2019-10-07 NOTE — PROGRESS NOTES
10/07/19 1223   Group Therapy   Group Nursing;Medication   Attendance Attended   Number of participants 5   Time in 0930   Time out 0950   Total Time 20   Interventions/techniques Informed   Follows directions Followed directions   Interactions Interacted appropriately   Mental Status Calm;Congruent   Behavior/appearance Attentive; Cooperative

## 2019-10-08 PROCEDURE — 74011250637 HC RX REV CODE- 250/637: Performed by: PSYCHIATRY & NEUROLOGY

## 2019-10-08 PROCEDURE — 65220000003 HC RM SEMIPRIVATE PSYCH

## 2019-10-08 RX ORDER — LISINOPRIL 20 MG/1
20 TABLET ORAL DAILY
Qty: 1 TAB | Refills: 0 | Status: SHIPPED
Start: 2019-10-09

## 2019-10-08 RX ORDER — CITALOPRAM 20 MG/1
20 TABLET, FILM COATED ORAL
Qty: 15 TAB | Refills: 1 | Status: SHIPPED | OUTPATIENT
Start: 2019-10-08 | End: 2021-02-23

## 2019-10-08 RX ORDER — PRAVASTATIN SODIUM 20 MG/1
20 TABLET ORAL DAILY
Qty: 1 TAB | Refills: 0 | Status: SHIPPED
Start: 2019-10-09 | End: 2021-02-23

## 2019-10-08 RX ADMIN — HYDROXYZINE PAMOATE 50 MG: 50 CAPSULE ORAL at 21:07

## 2019-10-08 RX ADMIN — PRAVASTATIN SODIUM 20 MG: 20 TABLET ORAL at 08:18

## 2019-10-08 RX ADMIN — LISINOPRIL 20 MG: 20 TABLET ORAL at 08:18

## 2019-10-08 RX ADMIN — CITALOPRAM HYDROBROMIDE 20 MG: 20 TABLET ORAL at 20:41

## 2019-10-08 RX ADMIN — HYDROCHLOROTHIAZIDE 25 MG: 25 TABLET ORAL at 08:18

## 2019-10-08 RX ADMIN — IBUPROFEN 600 MG: 600 TABLET, FILM COATED ORAL at 18:07

## 2019-10-08 RX ADMIN — OMEGA-3 FATTY ACIDS CAP 1000 MG 1 CAPSULE: 1000 CAP at 08:18

## 2019-10-08 NOTE — BH NOTES
Pt has  In day area most of the day and did participate in scheduled groups held. She was social with select peers and    and affect much brighter. Denies any negative thoughts. Reports possible discharge on tomorrow. Took nap after lunch  Will continue participation in treatment and plans for discharge and follow up. Cont to monitor for safety with non skid    socks and seek out staff if any issues.

## 2019-10-08 NOTE — BSMART NOTE
OCCUPATIONAL THERAPY PROGRESS NOTE Group Time:  0686 Attendance: The patient attended full group. Participation:. The patient participated with moderate elaboration in the activity. Attention: The patient needed redirection to activity at least once. Interaction: The patient frequently interacts with others.

## 2019-10-08 NOTE — PROGRESS NOTES
Rude sarcastic condescending. Can be difficult at times and abrasive. Eats and tolerates evening meal. Interacts with staff and peers. Dull flat keeps to self in room. Cooperative at times. Gripper socks and 15 minute checks in place for safety. Gait appropriate. Takes medicines without incident. Attends RN group. Will continue to monitor and support.

## 2019-10-08 NOTE — BH NOTES
Julee Merino  appears calm and cooperative in the milieu interacting with staff and peers. Pt. denies SI,HI and AVH today. Pt contracts for safety on the unit. Pt.denies any new medical/pain complaints. Pt. did not have any visitors. Staff encouraged Pt. to participate in treatment plan. Pt agreed. Staff will continue to monitor Pt. for behavior safety and location.

## 2019-10-08 NOTE — GROUP NOTE
IP  GROUP DOCUMENTATION INDIVIDUAL Group Therapy Note Date: October 8 Group Start Time: 46 Group End Time: 2328 Group Topic: Topic Group [facility] 1 ADULT CHEM Kiki Hargrove, RN 
 
[facility] GROUP DOCUMENTATION GROUP Group Therapy Note Attendees: 4 Attendance: Attended Patient's Goal:  Coping skills Interventions/techniques: Informed Follows Directions: Followed directions Interactions: Interacted appropriately Mental Status: Calm Behavior/appearance: Attentive Goals Achieved: Able to engage in interactions Additional Notes:   
 
Ngoc Ascencio RN

## 2019-10-08 NOTE — PROGRESS NOTES
The pt was seen individually and her case was discussed with staff. Depressed mostly due to her homelessness, the pt has shown improvement to the point in which we are planning on discharging her tomorrow. 24-Hr Vitals/Weight (last day)     Date/Time Temp Pulse BP MAP (Calculated) BP 1 Location BP Patient Position Resp SpO2 O2 Device O2 Flow Rate (L/min) Level of Consciousness MEWS Score Weight   10/08/19 0903 97.5 °F (36.4 °C) 103Abnormal  132/98Abnormal  109 Right arm Sitting 16    Alert 2    10/07/19 1253 98.6 °F (37 °C) 80 114/75 88   18    Alert 1    10/07/19 0840 98.6 °F (37 °C) 84 143/101Abnormal  115 Right arm At rest 18    Alert 1      Vitals showed a mild increased HR and elevated BP that improve after the pt takes her antihypertensives. Plan:  1. The pt will be followed by Dr. Hellen Holley in my absence. 2. Current plans are to discharge tomorrow. 3. Prescription for citalopram has been signed. She has prescription for the other meds prescribed. The help of Dr. Hellen Holley is appreciated.

## 2019-10-08 NOTE — BSMART NOTE
ART THERAPY GROUP PROGRESS NOTE PATIENT SCHEDULED FOR GROUP AT: 14:30 
 
ATTENDANCE: Full PARTICIPATION LEVEL: Participates fully in the art process ATTENTION LEVEL: Able to focus on task FOCUS: Identify emotions SYMBOLIC & THEMATIC CONTENT AS NOTED IN IMAGERY: She was cheerful and compliant. She interacted with peers and was invested in the task. Associations suggest limited insight and irrational in nature. She claimed that she is planning on getting her boyfriend out of [employer], whom has \"two life sentences, but the  says he doesn't need to be in [employer]. \" She also claimed that she doesn't have her 's license because she \"forgot to bring [her] glasses last time and they wouldn't blow up the screen for me to read out the letters for them. \" She then claimed she \"has too many fines to pay anyway to get a license right now,\" however she plans to drive out of town to get her boyfriend out of [employer].

## 2019-10-08 NOTE — GROUP NOTE
LUIS  GROUP DOCUMENTATION INDIVIDUAL Group Therapy Note Date: October 7 Group Start Time: 2000 Group End Time: 2015 Group Topic: Nursing [facility] 1 ADULT CHEM DEP April SMITH  GROUP DOCUMENTATION GROUP Group Therapy Note Attendees: 1 Attendance: Did not attend Shelly Haney

## 2019-10-09 VITALS
WEIGHT: 160.06 LBS | DIASTOLIC BLOOD PRESSURE: 85 MMHG | BODY MASS INDEX: 30.24 KG/M2 | RESPIRATION RATE: 16 BRPM | HEART RATE: 69 BPM | TEMPERATURE: 96.5 F | OXYGEN SATURATION: 98 % | SYSTOLIC BLOOD PRESSURE: 124 MMHG

## 2019-10-09 PROCEDURE — 74011250637 HC RX REV CODE- 250/637: Performed by: PSYCHIATRY & NEUROLOGY

## 2019-10-09 RX ADMIN — HYDROXYZINE PAMOATE 50 MG: 50 CAPSULE ORAL at 10:29

## 2019-10-09 RX ADMIN — LISINOPRIL 20 MG: 20 TABLET ORAL at 08:05

## 2019-10-09 RX ADMIN — PRAVASTATIN SODIUM 20 MG: 20 TABLET ORAL at 08:05

## 2019-10-09 RX ADMIN — HYDROCHLOROTHIAZIDE 25 MG: 25 TABLET ORAL at 08:05

## 2019-10-09 NOTE — BH NOTES
Treatment team [facility] -     Medical Director: __x___present   Psychiatrist: __x___present   Charge nurse: x_____present   MSW: __x___present   : _____present   Nurse Manager: _____present   Student RNs: _____present   Medical Students: _x____present   Art Therapist: _x____present   Clinical Coordinator: ___x__present    Occupational Therapist: __x___present   : _______ present  UR  __x_____ present  Crisis Supervisor___x____present      Plan of care discussed and updated as appropriate.

## 2019-10-09 NOTE — DISCHARGE INSTRUCTIONS
BEHAVIORAL HEALTH NURSING DISCHARGE NOTE      The following personal items collected during your admission are returned to you:   Dental Appliance: Dental Appliances: None  Vision: Visual Aid: None  Hearing Aid:    Jewelry: Jewelry: Other (comment)(See Valueable Sheet)  Clothing: Clothing: Other (comment)(See Valusble Sheet)  Other Valuables: Other Valuables: None  Valuables sent to safe:        PATIENT INSTRUCTIONS:      ***Emergency Numbers to Remember***  Behavioral Health: [facility] Emergency Services: [phone]  Suicide Hotline: 0-675.760.6880      The discharge information has been reviewed with the patient. The patient verbalized understanding. [employer] Activation    Thank you for requesting access to [employer]. Please follow the instructions below to securely access and download your online medical record. [employer] allows you to send messages to your doctor, view your test results, renew your prescriptions, schedule appointments, and more. How Do I Sign Up? 1. In your internet browser, go to [email or web address]  2. Click on the First Time User? Click Here link in the Sign In box. You will be redirect to the New Member Sign Up page. 3. Enter your [employer]ect Access Code exactly as it appears below. You will not need to use this code after youve completed the sign-up process. If you do not sign up before the expiration date, you must request a new code. ContraFect Access Code: [email or web address]  Expires: 10/18/2019  2:25 PM (This is the date your ContraFect access code will )    4. Enter the last four digits of your Social Security Number (xxxx) and Date of Birth (mm/dd/yyyy) as indicated and click Submit. You will be taken to the next sign-up page. 5. Create a ContraFect ID. This will be your Cont[employer]Fect login ID and cannot be changed, so think of one that is secure and easy to remember. 6. Create a Cont[employer]ect password. You can change your password at any time.   7. Enter your Password Reset Question and Answer. This can be used at a later time if you forget your password. 8. Enter your e-mail address. You will receive e-mail notification when new information is available in [address] 9. Click Sign Up. You can now view and download portions of your medical record. 10. Click the Download Summary menu link to download a portable copy of your medical information. Additional Information    If you have questions, please visit the Frequently Asked Questions section of the [employer]forty website at [email or web address] Remember, one[employer]y is NOT to be used for urgent needs. For medical emergencies, dial 911.       Patient armband removed and shredded

## 2019-10-09 NOTE — PROGRESS NOTES
Behavioral Health Progress Note    Admit Date: 10/3/2019  Hospital day 5    Vitals : No data found. Labs:  No results found for this or any previous visit (from the past 24 hour(s)). Meds:   No current facility-administered medications for this encounter. Current Outpatient Medications   Medication Sig    citalopram (CELEXA) 20 mg tablet Take 1 Tab by mouth nightly. Indications: major depressive disorder    lisinopril (PRINIVIL, ZESTRIL) 20 mg tablet Take 1 Tab by mouth daily. Indications: high blood pressure    pravastatin (PRAVACHOL) 20 mg tablet Take 1 Tab by mouth daily. Indications: high cholesterol and high triglycerides    hydroCHLOROthiazide (HYDRODIURIL) 25 mg tablet Take 1 Tab by mouth daily.  omega-3 acid ethyl esters (LOVAZA) 1 gram capsule Take 2 Caps by mouth two (2) times a day.  multivitamin (ONE A DAY) tablet Take 1 Tab by mouth daily. Hospital Problems: Principal Problem:    MDD (major depressive disorder), recurrent severe[facility] (10/4/2019)    Active Problems:    Depression (10/3/2019)        Subjective:   Medication side effects: none  none    Mental Status Exam  Sensorium: alert  Orientation: oriented to time, place, person and situation  Relations: cooperative  Eye Contact: appropriate  Appearance: shows no evidence of impairment  Thought Process: normal rate of thoughts and fair abstract reasoning/computation   Thought Content: no evidence of impairment   Suicidal: denies   Homicidal: none   Mood: is euthymic   Affect: stable  Memory: shows no evidence of impairment     Concentration: good  Abstraction: concrete  Insight: The patient's insight shows no evidence of impairment    OR Fair  Judgement: shows no evidence of impairment OR  Fair    Assessment/Plan:   improved    Seen this am prior to DC. She needed to talk w/ SW about housing issue and to finish form she had started. No psychosis, not depressed . Denies SI or HI.  Proceed w/ discharge as planned by Dr Apple Hillman.  Scrips were already written

## 2019-10-09 NOTE — GROUP NOTE
IP  GROUP DOCUMENTATION INDIVIDUAL Group Therapy Note Date: October 9 Group Start Time: 1100 Group End Time: 8887 Group Topic: Nursing [facility] 1 ADULT CHEM DEP Josi Salazar, RN 
 
[facility] GROUP DOCUMENTATION GROUP Group Therapy Note Attendees: 3 Attendance: Attended Patient's Goal:  To listen to presentation Interventions/techniques: Informed Follows Directions: Followed directions Interactions: Interacted appropriately Mental Status: Calm Behavior/appearance: Argumentative Goals Achieved: Able to engage in interactions, Able to listen to others and Able to give feedback to another Additional Notes:  Patient listened to presentation on flu vaccines and continues to decline due to the vaccine making her sick.   
 
Ann Pugh RN

## 2019-10-09 NOTE — BSMART NOTE
Pt. is a 53year old homeless female with history of Bipolar Disorder and Alcohol Dependence.  Pt. was admitted the hospital for ideations harm self.  
  
  
Pt. s case was discussed in treatment team this a.m   The pt. will be dc today. ANJU assisted the pt with  [facility] PACT program application. ANJU explained to the pt., she needs to get enrolled in treatment first at [facility]. The pt application will be forwarded to the [facility]. Pt. States she will attempt to go this week . Pt. Denies both ideations and hallucinations. ANJU encouraged medication compliance and ask for the pt to follow thru with getting enrolled into services.  Pt. `is encouraged to follow-up with intake [facility] 10[facility]10[facility]19 [facility] [address] [place], [place] [id number] [phone].

## 2019-10-09 NOTE — BH NOTES
Patient was discharged off the unit with her belongings, discharge paperwork and prescription. Patient was given a bus pass for transport.

## 2019-10-20 NOTE — DISCHARGE SUMMARY
[facility]    Name:  Andrews Cosme  MR#:   [id number]  :  1965  ACCOUNT #:  [de-identified]  ADMIT DATE:  10/03/2019  DISCHARGE DATE:  10/09/2019    SIGNIFICANT FINDINGS:  History and physical exam was performed shortly after the patient was admitted to the facility. Attention being invited to this document, a place where the patient's reasons for admission, the same as her prior psychiatric history and findings upon her being examined at [facility] ER are very clearly stated. For the purpose of this discharge summary, the reader is advised that the patient presenting herself to [facility] ER noted to be acutely alcohol intoxicated, describing the presence of depression and suicidal ideations. The patient described her being depressed since her fiance had left and went to prison on or about . She reported that she had been homeless and not able to be helped by her family, whom she described as believing \"I am an embarrassment to them. \"  The patient reported that she was at the emergency room recently, and she was escorted out by security and thus was never evaluated for treatment. It was not clear as to what happened and obviously the possibility of the patient's behavior not being appropriate being raised. During her evaluation, the patient reported the presence of suicidal thoughts and questions being raised about her ability to maintain self-control and so when the case was presented to the physician on call, inpatient hospitalization was obviously indicated. She did describe that she drank a large bottle of Spice the day before; however, she denied the use of any other drugs or tobacco abuse. The patient's prior psychiatric history is remarkable for history of hospitalization in this facility, her last admission here being under the care of Dr. Rajesh Bellamy on or about 2015.   The patient was admitted on the  and discharged on  with a final diagnosis of alcohol use disorder with severe dependency. She was obviously considered to have a mood disorder since she was treated with lithium for the purpose of more increased mood stability, however was discharged only on hydrochlorothiazide for treatment of hypertension and a very low dose of Librium 5 mg every 12 hours for two further doses. The patient was referred to outpatient treatment with [facility]; however, it appears that she never went. At the time of the patient's evaluation in the emergency room, multiple labs were performed including a CBC with differential that showed the presence of mild anemia with hemoglobin of 11.9, RBC of 3.11, hematocrit of 34.4, platelet count normal at 260. Differential was considered to be normal.  BMP shows potassium of 3.1 with a sodium of 136, chloride of 99, blood sugar 114, BUN of 10, creatinine of 1.29, estimated GFR 43 mL/minute with the patient describing a prior history of renal failure, possibly associated to severe dehydration. Urine drug screen was negative. Alcohol levels below 3. COURSE DURING HOSPITALIZATION AND TREATMENT:  Admitted to the adult CD program.  The patient was seen on daily individual psychiatric basis and was also referred to other groups within context of the program.  Rather despondent, the patient was prescribed with a combination of medications, which included during this admission prescriptions for hydrochlorothiazide and lisinopril for her hypertension, the same as pravastatin and omega-3-acid capsules due to her history of dyslipidemia; however, for the treatment of depression, citalopram up to 20 mg every night at bedtime was prescribed with excellent tolerance. The patient showed no evidence throughout of adverse effects in association to medications as prescribed with improvement noted by the time in which we were able to discharge her.   The patient remained having multiple social stressors, especially the fact that she was homeless; however, for her to be able to have a place where to go to and not wanting to leave the [facility], the only place available for her could have been a referral to [facility], and after becoming a patient of [facility], to be accepted within their placement program.  The patient understood that and was very well aware of having to become a patient of [facility] but also to be compliant with their treatment provided there. CONDITION UPON DISCHARGE:  Not suicidal nor homicidal, not psychotic nor organic, and psychiatrically competent. FINAL DIAGNOSES:  Axis I:  Major depressive disorder, recurrent without psychosis, severe. Alcohol use disorder, moderate. Axis II:  Deferred. Axis III:  Hypertension, under treatment. Dyslipidemia, under treatment. Hypokalemia, treated. History of acute renal failure, resolved with mild renal insufficiency noted. History of allergies to sulfa drugs. DISPOSITION:  As stated, the patient was referred back to [facility] for further outpatient treatment. She was advised to consult with the primary care physician of her choice for further outpatient treatment. PRESCRIPTIONS UPON DISCHARGE:  The following prescriptions were written for two weeks with one refill including Celexa 20 mg tablets to take one every night. The patient had the following prescriptions, which she was strongly advised to continue taking them including hydrochlorothiazide 25 mg daily, lisinopril 20 mg everyday, multivitamins one daily, omega-3-acid 2000 mg twice a day, and pravastatin 20 mg daily. PROGNOSIS:  Will entirely depend upon the patient's treatment compliance.         Brea Waters MD      [id number]  D:  10/19/2019 11:59  T:  10/20/2019 13:54  JOB #:  [id number]

## 2019-10-24 NOTE — ED NOTES
Gave patient dinner and washed her with warm towels.
Patient resting in bed. Respirations are even and unlabored. Patient does not seem to be in distress at this time.
Pt ambulated out of ED
Pt demanding food and water. Explained to pt we could not give her anything until ultrasound results are back. Pt states she has had 2 stools since she has been here,  Stools described as soft and formed per patient. Requested that patient give us a stool sample the next time she goes to the bathroom.   Pt voiced understanding
Received discharge instructions with patient. Patient verbalized understanding and does not have any questions at this time.
Chart(s)/Patient

## 2019-12-26 ENCOUNTER — HOSPITAL ENCOUNTER (OUTPATIENT)
Dept: MAMMOGRAPHY | Age: 54
Discharge: HOME | End: 2019-12-26
Attending: FAMILY MEDICINE
Payer: MEDICAID

## 2019-12-26 DIAGNOSIS — Z12.31 VISIT FOR SCREENING MAMMOGRAM: ICD-10-CM

## 2019-12-26 PROCEDURE — 77063 BREAST TOMOSYNTHESIS BI: CPT

## 2020-03-12 ENCOUNTER — HOSPITAL ENCOUNTER (OUTPATIENT)
Dept: LAB | Age: 55
Discharge: HOME | End: 2020-03-12
Payer: MEDICAID

## 2020-03-12 DIAGNOSIS — F90.0 ATTENTION DEFICIT HYPERACTIVITY DISORDER, INATTENTIVE TYPE: ICD-10-CM

## 2020-03-12 DIAGNOSIS — F34.0 CYCLOTHYMIC DISORDER: ICD-10-CM

## 2020-03-12 LAB
ATRIAL RATE: 101 BPM
CALCULATED P AXIS, ECG09: 60 DEGREES
CALCULATED R AXIS, ECG10: 4 DEGREES
CALCULATED T AXIS, ECG11: 32 DEGREES
DIAGNOSIS, 93000: NORMAL
P-R INTERVAL, ECG05: 160 MS
Q-T INTERVAL, ECG07: 366 MS
QRS DURATION, ECG06: 78 MS
QTC CALCULATION (BEZET), ECG08: 474 MS
VENTRICULAR RATE, ECG03: 101 BPM

## 2020-03-12 PROCEDURE — 93005 ELECTROCARDIOGRAM TRACING: CPT

## 2020-07-22 NOTE — PROGRESS NOTES
[facility] Hospitalist Group  Progress Note    Patient: Nani Joy Age: 46 y.o. : 1965 MR#: [id number] SSN: [id number]  Date/Time: 2019     Subjective:     Lying in bed , no new complaints , nurse mentions he doesn't want her IV lines/ IVF or her Tele monitor on         Assessment/Plan:     1. ARF - resolving , continue IVF   2. C diff + - IV Flagyl   3. UTI - IV Zosyn , will change to PO Abx - Cx results pending   4. Hypotension resolved   5. Anemia - monitor H&H - will transfuse for Hgb <7.0   6. H/o Alcohol abuse - Ativan prn   7. Homeless - CM on board   DVT Px - Heparin   FC           Case discussed with:  [x]Patient  []Family  [x]Nursing  []Case Management  DVT Prophylaxis:  []Lovenox  [x]Hep SQ  []SCDs  []Coumadin   []On Heparin gtt    Objective:   VS:   Visit Vitals  /56   Pulse 88   Temp 98.3 °F (36.8 °C)   Resp 22   Ht 5' 1\" (1.549 m)   Wt 78.3 kg (172 lb 9.9 oz)   SpO2 100%   Breastfeeding? No   BMI 32.62 kg/m²      Tmax/24hrs: Temp (24hrs), Av.2 °F (36.8 °C), Min:98 °F (36.7 °C), Max:98.4 °F (36.9 °C)  IOBRIEF    Intake/Output Summary (Last 24 hours) at 2019 1817  Last data filed at 2019 1559  Gross per 24 hour   Intake 165.33 ml   Output 1650 ml   Net -1484.67 ml       General:  Alert, cooperative, no acute distress    HEENT: PERRLA, anicteric sclerae. Pulmonary:  CTA Bilaterally. No Wheezing/Rhonchi/Rales. Cardiovascular: Regular rate and Rhythm. GI:  Soft, Non distended, Non tender. + Bowel sounds. Extremities:  No edema, cyanosis, clubbing. No calf tenderness. Neurologic: Alert and oriented X 3. No acute neuro deficits.   Additional:    Medications:   Current Facility-Administered Medications   Medication Dose Route Frequency    Vancomycin Lab Information  1 Each Other ONCE    piperacillin-tazobactam (ZOSYN) 2.25 g in dextrose 5% (MBP/ADV) 50 mL MBP  2.25 g IntraVENous Q6H    metroNIDAZOLE (FLAGYL) tablet 500 mg  500 mg Oral TID  heparin (porcine) injection 5,000 Units  5,000 Units SubCUTAneous Q8H    thiamine (B-1) 500 mg in dextrose 5% 50 mL IVPB  500 mg IntraVENous Q8H    0.9% sodium chloride infusion  100 mL/hr IntraVENous CONTINUOUS    sodium chloride (NS) flush 5-10 mL  5-10 mL IntraVENous PRN    VANCOMYCIN INFORMATION NOTE   Other CONTINUOUS    amino acids/multivit with iron (CHAPIS-RECOVER) capsule 2 Cap  2 Cap Oral TID WITH MEALS    trimethobenzamide (TIGAN) injection 200 mg  200 mg IntraMUSCular Q4H PRN    sodium chloride (NS) flush 5-40 mL  5-40 mL IntraVENous Q8H    sodium chloride (NS) flush 5-40 mL  5-40 mL IntraVENous PRN    [START ON 7/18/2019] thiamine (B-1) 250 mg in 0.9% sodium chloride 50 mL IVPB  250 mg IntraVENous DAILY    sodium chloride (NS) flush 5-40 mL  5-40 mL IntraVENous Q8H    sodium chloride (NS) flush 5-40 mL  5-40 mL IntraVENous PRN    HYDROmorphone (DILAUDID) injection 1 mg  1 mg IntraVENous Q3H PRN    naloxone (NARCAN) injection 0.4 mg  0.4 mg IntraVENous EVERY 2 MINUTES AS NEEDED       Labs:    Recent Results (from the past 24 hour(s))   MAGNESIUM    Collection Time: 07/16/19  8:14 PM   Result Value Ref Range    Magnesium 1.9 1.6 - 2.6 mg/dL   PHOSPHORUS    Collection Time: 07/16/19  8:14 PM   Result Value Ref Range    Phosphorus 2.6 2.5 - 4.9 MG/DL   METABOLIC PANEL, BASIC    Collection Time: 07/16/19  8:14 PM   Result Value Ref Range    Sodium 133 (L) 136 - 145 mmol/L    Potassium 3.9 3.5 - 5.5 mmol/L    Chloride 105 100 - 108 mmol/L    CO2 20 (L) 21 - 32 mmol/L    Anion gap 8 3.0 - 18 mmol/L    Glucose 96 74 - 99 mg/dL    BUN 41 (H) 7.0 - 18 MG/DL    Creatinine 3.51 (H) 0.6 - 1.3 MG/DL    BUN/Creatinine ratio 12 12 - 20      GFR est AA 16 (L) >60 ml/min/1.73m2    GFR est non-AA 14 (L) >60 ml/min/1.73m2    Calcium 8.1 (L) 8.5 - 10.1 MG/DL   WBC, STOOL    Collection Time: 07/16/19 11:00 PM   Result Value Ref Range    White blood cells, stool FEW /HPF   OCCULT BLOOD, STOOL    Collection Time: 07/16/19 11:00 PM   Result Value Ref Range    Occult blood, stool POSITIVE (A) NEG     MAGNESIUM    Collection Time: 07/17/19 12:23 AM   Result Value Ref Range    Magnesium 1.7 1.6 - 2.6 mg/dL   PHOSPHORUS    Collection Time: 07/17/19 12:23 AM   Result Value Ref Range    Phosphorus 2.5 2.5 - 4.9 MG/DL   METABOLIC PANEL, BASIC    Collection Time: 07/17/19 12:23 AM   Result Value Ref Range    Sodium 137 136 - 145 mmol/L    Potassium 3.8 3.5 - 5.5 mmol/L    Chloride 107 100 - 108 mmol/L    CO2 21 21 - 32 mmol/L    Anion gap 9 3.0 - 18 mmol/L    Glucose 94 74 - 99 mg/dL    BUN 39 (H) 7.0 - 18 MG/DL    Creatinine 3.12 (H) 0.6 - 1.3 MG/DL    BUN/Creatinine ratio 13 12 - 20      GFR est AA 19 (L) >60 ml/min/1.73m2    GFR est non-AA 16 (L) >60 ml/min/1.73m2    Calcium 7.7 (L) 8.5 - 10.1 MG/DL   HGB & HCT    Collection Time: 07/17/19 12:23 AM   Result Value Ref Range    HGB 7.8 (L) 12.0 - 16.0 g/dL    HCT 22.2 (L) 35.0 - 30.9 %   METABOLIC PANEL, BASIC    Collection Time: 07/17/19  5:00 AM   Result Value Ref Range    Sodium 137 136 - 145 mmol/L    Potassium 3.9 3.5 - 5.5 mmol/L    Chloride 108 100 - 108 mmol/L    CO2 19 (L) 21 - 32 mmol/L    Anion gap 10 3.0 - 18 mmol/L    Glucose 82 74 - 99 mg/dL    BUN 35 (H) 7.0 - 18 MG/DL    Creatinine 2.86 (H) 0.6 - 1.3 MG/DL    BUN/Creatinine ratio 12 12 - 20      GFR est AA 21 (L) >60 ml/min/1.73m2    GFR est non-AA 17 (L) >60 ml/min/1.73m2    Calcium 7.7 (L) 8.5 - 10.1 MG/DL   CBC WITH AUTOMATED DIFF    Collection Time: 07/17/19  5:00 AM   Result Value Ref Range    WBC 4.6 4.6 - 13.2 K/uL    RBC 2.22 (L) 4.20 - 5.30 M/uL    HGB 8.0 (L) 12.0 - 16.0 g/dL    HCT 23.1 (L) 35.0 - 45.0 %    .1 (H) 74.0 - 97.0 FL    MCH 36.0 (H) 24.0 - 34.0 PG    MCHC 34.6 31.0 - 37.0 g/dL    RDW 13.7 11.6 - 14.5 %    PLATELET 132 839 - 701 K/uL    MPV 10.0 9.2 - 11.8 FL    NEUTROPHILS 60 40 - 73 %    LYMPHOCYTES 32 21 - 52 %    MONOCYTES 6 3 - 10 %    EOSINOPHILS 2 0 - 5 %    BASOPHILS 0 0 - 2 % ABS. NEUTROPHILS 2.7 1.8 - 8.0 K/UL    ABS. LYMPHOCYTES 1.5 0.9 - 3.6 K/UL    ABS. MONOCYTES 0.3 0.05 - 1.2 K/UL    ABS. EOSINOPHILS 0.1 0.0 - 0.4 K/UL    ABS. BASOPHILS 0.0 0.0 - 0.1 K/UL    DF AUTOMATED     MAGNESIUM    Collection Time: 07/17/19  5:00 AM   Result Value Ref Range    Magnesium 1.7 1.6 - 2.6 mg/dL   PHOSPHORUS    Collection Time: 07/17/19  5:00 AM   Result Value Ref Range    Phosphorus 2.6 2.5 - 4.9 MG/DL   HEPATIC FUNCTION PANEL    Collection Time: 07/17/19  5:00 AM   Result Value Ref Range    Protein, total 5.7 (L) 6.4 - 8.2 g/dL    Albumin 2.7 (L) 3.4 - 5.0 g/dL    Globulin 3.0 2.0 - 4.0 g/dL    A-G Ratio 0.9 0.8 - 1.7      Bilirubin, total 0.5 0.2 - 1.0 MG/DL    Bilirubin, direct 0.2 0.0 - 0.2 MG/DL    Alk.  phosphatase 75 45 - 117 U/L    AST (SGOT) 144 (H) 15 - 37 U/L    ALT (SGPT) 78 (H) 13 - 56 U/L   VITAMIN B12 & FOLATE    Collection Time: 07/17/19  5:00 AM   Result Value Ref Range    Vitamin B12 339 211 - 911 pg/mL    Folate 6.4 3.10 - 17.50 ng/mL   MAGNESIUM    Collection Time: 07/17/19 11:00 AM   Result Value Ref Range    Magnesium 1.6 1.6 - 2.6 mg/dL   PHOSPHORUS    Collection Time: 07/17/19 11:00 AM   Result Value Ref Range    Phosphorus 2.0 (L) 2.5 - 4.9 MG/DL   METABOLIC PANEL, BASIC    Collection Time: 07/17/19 11:00 AM   Result Value Ref Range    Sodium 138 136 - 145 mmol/L    Potassium 3.9 3.5 - 5.5 mmol/L    Chloride 109 (H) 100 - 108 mmol/L    CO2 22 21 - 32 mmol/L    Anion gap 7 3.0 - 18 mmol/L    Glucose 110 (H) 74 - 99 mg/dL    BUN 31 (H) 7.0 - 18 MG/DL    Creatinine 2.57 (H) 0.6 - 1.3 MG/DL    BUN/Creatinine ratio 12 12 - 20      GFR est AA 24 (L) >60 ml/min/1.73m2    GFR est non-AA 19 (L) >60 ml/min/1.73m2    Calcium 7.9 (L) 8.5 - 10.1 MG/DL   HGB & HCT    Collection Time: 07/17/19 11:00 AM   Result Value Ref Range    HGB 7.7 (L) 12.0 - 16.0 g/dL    HCT 21.8 (L) 35.0 - 45.0 %   VANCOMYCIN, RANDOM    Collection Time: 07/17/19  1:00 PM   Result Value Ref Range    Vancomycin, random 13.3 5.0 - 40.0 UG/ML   MAGNESIUM    Collection Time: 07/17/19  4:04 PM   Result Value Ref Range    Magnesium 1.5 (L) 1.6 - 2.6 mg/dL   PHOSPHORUS    Collection Time: 07/17/19  4:04 PM   Result Value Ref Range    Phosphorus 2.6 2.5 - 4.9 MG/DL   METABOLIC PANEL, BASIC    Collection Time: 07/17/19  4:04 PM   Result Value Ref Range    Sodium 138 136 - 145 mmol/L    Potassium 4.2 3.5 - 5.5 mmol/L    Chloride 109 (H) 100 - 108 mmol/L    CO2 24 21 - 32 mmol/L    Anion gap 5 3.0 - 18 mmol/L    Glucose 102 (H) 74 - 99 mg/dL    BUN 29 (H) 7.0 - 18 MG/DL    Creatinine 2.21 (H) 0.6 - 1.3 MG/DL    BUN/Creatinine ratio 13 12 - 20      GFR est AA 28 (L) >60 ml/min/1.73m2    GFR est non-AA 23 (L) >60 ml/min/1.73m2    Calcium 8.4 (L) 8.5 - 10.1 MG/DL       Signed By: Azar Ramsey MD     July 17, 2019 No

## 2021-02-23 ENCOUNTER — APPOINTMENT (OUTPATIENT)
Dept: CT IMAGING | Age: 56
DRG: 720 | End: 2021-02-23
Attending: EMERGENCY MEDICINE
Payer: MEDICAID

## 2021-02-23 ENCOUNTER — HOSPITAL ENCOUNTER (INPATIENT)
Age: 56
LOS: 2 days | Discharge: LEFT BEFORE DISCHARGE | DRG: 720 | End: 2021-02-25
Attending: EMERGENCY MEDICINE | Admitting: INTERNAL MEDICINE
Payer: MEDICAID

## 2021-02-23 DIAGNOSIS — D72.825 BANDEMIA: ICD-10-CM

## 2021-02-23 DIAGNOSIS — E87.6 HYPOKALEMIA: ICD-10-CM

## 2021-02-23 DIAGNOSIS — K81.0 ACUTE CHOLECYSTITIS: Primary | ICD-10-CM

## 2021-02-23 DIAGNOSIS — R79.89 ELEVATED LACTIC ACID LEVEL: ICD-10-CM

## 2021-02-23 DIAGNOSIS — R17 ELEVATED BILIRUBIN: ICD-10-CM

## 2021-02-23 PROBLEM — D72.829 LEUKOCYTOSIS: Status: ACTIVE | Noted: 2021-02-23

## 2021-02-23 LAB
ALBUMIN SERPL-MCNC: 2.1 G/DL (ref 3.4–5)
ALBUMIN/GLOB SERPL: 0.3 {RATIO} (ref 0.8–1.7)
ALP SERPL-CCNC: 147 U/L (ref 45–117)
ALT SERPL-CCNC: 15 U/L (ref 13–56)
AMMONIA PLAS-SCNC: 33 UMOL/L (ref 11–32)
ANION GAP SERPL CALC-SCNC: 14 MMOL/L (ref 3–18)
AST SERPL-CCNC: 128 U/L (ref 10–38)
BASOPHILS # BLD: 0 K/UL (ref 0–0.06)
BASOPHILS NFR BLD: 0 % (ref 0–3)
BILIRUB DIRECT SERPL-MCNC: 2.4 MG/DL (ref 0–0.2)
BILIRUB SERPL-MCNC: 5.4 MG/DL (ref 0.2–1)
BUN SERPL-MCNC: 25 MG/DL (ref 7–18)
BUN/CREAT SERPL: 14 (ref 12–20)
CALCIUM SERPL-MCNC: 8.6 MG/DL (ref 8.5–10.1)
CHLORIDE SERPL-SCNC: 100 MMOL/L (ref 100–111)
CO2 SERPL-SCNC: 27 MMOL/L (ref 21–32)
COVID-19 RAPID TEST, COVR: NOT DETECTED
CREAT SERPL-MCNC: 1.83 MG/DL (ref 0.6–1.3)
DIFFERENTIAL METHOD BLD: ABNORMAL
EOSINOPHIL # BLD: 0.2 K/UL (ref 0–0.4)
EOSINOPHIL NFR BLD: 1 % (ref 0–5)
ERYTHROCYTE [DISTWIDTH] IN BLOOD BY AUTOMATED COUNT: 16.1 % (ref 11.6–14.5)
ETHANOL SERPL-MCNC: <3 MG/DL (ref 0–3)
GLOBULIN SER CALC-MCNC: 6.5 G/DL (ref 2–4)
GLUCOSE SERPL-MCNC: 124 MG/DL (ref 74–99)
HCT VFR BLD AUTO: 27.3 % (ref 35–45)
HGB BLD-MCNC: 9.4 G/DL (ref 12–16)
INR PPP: 1.5 (ref 0.8–1.2)
LACTATE BLD-SCNC: 2.07 MMOL/L (ref 0.4–2)
LACTATE BLD-SCNC: 2.46 MMOL/L (ref 0.4–2)
LIPASE SERPL-CCNC: 437 U/L (ref 73–393)
LYMPHOCYTES # BLD: 2.3 K/UL (ref 0.8–3.5)
LYMPHOCYTES NFR BLD: 10 % (ref 20–51)
MAGNESIUM SERPL-MCNC: 2 MG/DL (ref 1.6–2.6)
MCH RBC QN AUTO: 38.4 PG (ref 24–34)
MCHC RBC AUTO-ENTMCNC: 34.4 G/DL (ref 31–37)
MCV RBC AUTO: 111.4 FL (ref 74–97)
METAMYELOCYTES NFR BLD MANUAL: 1 %
MONOCYTES # BLD: 0.7 K/UL (ref 0–1)
MONOCYTES NFR BLD: 3 % (ref 2–9)
NEUTS BAND NFR BLD MANUAL: 8 % (ref 0–5)
NEUTS SEG # BLD: 19.6 K/UL (ref 1.8–8)
NEUTS SEG NFR BLD: 77 % (ref 42–75)
PLATELET # BLD AUTO: 300 K/UL (ref 135–420)
PLATELET COMMENTS,PCOM: ABNORMAL
PMV BLD AUTO: 11 FL (ref 9.2–11.8)
POTASSIUM SERPL-SCNC: 3.1 MMOL/L (ref 3.5–5.5)
PROT SERPL-MCNC: 8.6 G/DL (ref 6.4–8.2)
PROTHROMBIN TIME: 18.2 SEC (ref 11.5–15.2)
RBC # BLD AUTO: 2.45 M/UL (ref 4.2–5.3)
RBC MORPH BLD: ABNORMAL
SARS-COV-2, COV2: NORMAL
SODIUM SERPL-SCNC: 141 MMOL/L (ref 136–145)
SOURCE, COVRS: NORMAL
WBC # BLD AUTO: 23 K/UL (ref 4.6–13.2)

## 2021-02-23 PROCEDURE — 96365 THER/PROPH/DIAG IV INF INIT: CPT

## 2021-02-23 PROCEDURE — 96366 THER/PROPH/DIAG IV INF ADDON: CPT

## 2021-02-23 PROCEDURE — 85610 PROTHROMBIN TIME: CPT

## 2021-02-23 PROCEDURE — 74011000258 HC RX REV CODE- 258: Performed by: EMERGENCY MEDICINE

## 2021-02-23 PROCEDURE — 74011250636 HC RX REV CODE- 250/636: Performed by: EMERGENCY MEDICINE

## 2021-02-23 PROCEDURE — 83735 ASSAY OF MAGNESIUM: CPT

## 2021-02-23 PROCEDURE — 80053 COMPREHEN METABOLIC PANEL: CPT

## 2021-02-23 PROCEDURE — 99223 1ST HOSP IP/OBS HIGH 75: CPT | Performed by: SURGERY

## 2021-02-23 PROCEDURE — 70450 CT HEAD/BRAIN W/O DYE: CPT

## 2021-02-23 PROCEDURE — 82077 ASSAY SPEC XCP UR&BREATH IA: CPT

## 2021-02-23 PROCEDURE — 83605 ASSAY OF LACTIC ACID: CPT

## 2021-02-23 PROCEDURE — 74176 CT ABD & PELVIS W/O CONTRAST: CPT

## 2021-02-23 PROCEDURE — 87040 BLOOD CULTURE FOR BACTERIA: CPT

## 2021-02-23 PROCEDURE — 82140 ASSAY OF AMMONIA: CPT

## 2021-02-23 PROCEDURE — 74011000250 HC RX REV CODE- 250: Performed by: EMERGENCY MEDICINE

## 2021-02-23 PROCEDURE — 87635 SARS-COV-2 COVID-19 AMP PRB: CPT

## 2021-02-23 PROCEDURE — 83690 ASSAY OF LIPASE: CPT

## 2021-02-23 PROCEDURE — 99285 EMERGENCY DEPT VISIT HI MDM: CPT

## 2021-02-23 PROCEDURE — 74011250637 HC RX REV CODE- 250/637: Performed by: EMERGENCY MEDICINE

## 2021-02-23 PROCEDURE — 85025 COMPLETE CBC W/AUTO DIFF WBC: CPT

## 2021-02-23 PROCEDURE — 82248 BILIRUBIN DIRECT: CPT

## 2021-02-23 PROCEDURE — 65660000000 HC RM CCU STEPDOWN

## 2021-02-23 RX ORDER — POTASSIUM CHLORIDE 20 MEQ/1
40 TABLET, EXTENDED RELEASE ORAL
Status: COMPLETED | OUTPATIENT
Start: 2021-02-23 | End: 2021-02-23

## 2021-02-23 RX ORDER — SODIUM CHLORIDE 0.9 % (FLUSH) 0.9 %
5-10 SYRINGE (ML) INJECTION AS NEEDED
Status: DISCONTINUED | OUTPATIENT
Start: 2021-02-23 | End: 2021-02-25 | Stop reason: HOSPADM

## 2021-02-23 RX ORDER — MORPHINE SULFATE 2 MG/ML
2 INJECTION, SOLUTION INTRAMUSCULAR; INTRAVENOUS
Status: DISCONTINUED | OUTPATIENT
Start: 2021-02-23 | End: 2021-02-23

## 2021-02-23 RX ADMIN — PIPERACILLIN SODIUM AND TAZOBACTAM SODIUM 3.38 G: 3; .375 INJECTION, POWDER, LYOPHILIZED, FOR SOLUTION INTRAVENOUS at 22:30

## 2021-02-23 RX ADMIN — PIPERACILLIN SODIUM AND TAZOBACTAM SODIUM 3.38 G: 3; .375 INJECTION, POWDER, LYOPHILIZED, FOR SOLUTION INTRAVENOUS at 17:42

## 2021-02-23 RX ADMIN — SODIUM CHLORIDE 1000 ML: 900 INJECTION, SOLUTION INTRAVENOUS at 17:39

## 2021-02-23 RX ADMIN — SODIUM CHLORIDE 190 ML: 9 INJECTION, SOLUTION INTRAVENOUS at 19:30

## 2021-02-23 RX ADMIN — POTASSIUM CHLORIDE 40 MEQ: 1500 TABLET, EXTENDED RELEASE ORAL at 17:42

## 2021-02-23 RX ADMIN — FOLIC ACID: 5 INJECTION, SOLUTION INTRAMUSCULAR; INTRAVENOUS; SUBCUTANEOUS at 15:30

## 2021-02-23 NOTE — ED PROVIDER NOTES
EMERGENCY DEPARTMENT HISTORY AND PHYSICAL EXAM    4:44 PM      Date: 2/23/2021  Patient Name: Gloria Man    History of Presenting Illness     Chief Complaint   Patient presents with    Abnormal Lab Results    Alcohol Problem         History Provided By: Patient    Additional History (Context): Gloria Man is a 63 y.o. female with hypertension and renal insufficiency who presents EMS with abdominal pain. Patient was sent here by her PCP for elevated LFTs and possible alcohol use. Patient denies any  current pain. Patient denies fever, cough, nausea, vomiting or diarrhea. She states she has not drank in 2 days. She does admit to smoking a pack per day. Denies any recreational drug use. PCP: Hema Sidhu MD        Current Facility-Administered Medications   Medication Dose Route Frequency Provider Last Rate Last Admin    potassium chloride (K-DUR, KLOR-CON) SR tablet 40 mEq  40 mEq Oral NOW Valeriano Jordan DO        sodium chloride (NS) flush 5-10 mL  5-10 mL IntraVENous PRN Valeriano Jordan DO        sodium chloride 0.9 % bolus infusion 1,000 mL  1,000 mL IntraVENous ONCE Valeriano Jordan DO        Followed by   Everlene Joshua sodium chloride 0.9 % bolus infusion 1,000 mL  1,000 mL IntraVENous ONCE Valeriano Jordan DO        Followed by   Everlene Joshua sodium chloride 0.9 % bolus infusion 190 mL  190 mL IntraVENous ONCE Valeriano Jordan DO        piperacillin-tazobactam (ZOSYN) 3.375 g in 0.9% sodium chloride (MBP/ADV) 100 mL MBP  3.375 g IntraVENous Q6H Hoa Jordan, DO         Current Outpatient Medications   Medication Sig Dispense Refill    lisinopril (PRINIVIL, ZESTRIL) 20 mg tablet Take 1 Tab by mouth daily. Indications: high blood pressure 1 Tab 0    hydroCHLOROthiazide (HYDRODIURIL) 25 mg tablet Take 1 Tab by mouth daily.  30 Tab 0       Past History     Past Medical History:  Past Medical History:   Diagnosis Date    Acute renal failure ([facility]F) ([facility] [address] 7/15/2019    Hypertension     Hyponatremia 7/15/2019    Other ill-defined conditions(799.89)     high cholesterol    Psychiatric disorder        Past Surgical History:  Past Surgical History:   Procedure Laterality Date    IMPLANT BREAST SILICONE/EQ         Family History:  Family History   Problem Relation Age of Onset    Stroke Mother     Diabetes Mother     Hypertension Father     Diabetes Father     Cancer Paternal Uncle        Social History:  Social History     Tobacco Use    Smoking status: Never Smoker    Smokeless tobacco: Never Used   Substance Use Topics    Alcohol use: Yes    Drug use: No       Allergies: Allergies   Allergen Reactions    Sulfa (Sulfonamide Antibiotics) Rash         Review of Systems       Review of Systems   Constitutional: Negative. Negative for chills, diaphoresis and fever. HENT: Negative. Negative for congestion, rhinorrhea and sore throat. Eyes: Negative. Negative for pain, discharge and redness. Respiratory: Negative. Negative for cough, chest tightness, shortness of breath and wheezing. Cardiovascular: Negative. Negative for chest pain. Gastrointestinal: Negative. Negative for abdominal pain, constipation, diarrhea, nausea and vomiting. Genitourinary: Negative. Negative for dysuria, flank pain, frequency, hematuria and urgency. Musculoskeletal: Negative. Negative for back pain and neck pain. Skin: Negative. Negative for rash. Neurological: Negative. Negative for syncope, weakness, numbness and headaches. Psychiatric/Behavioral: Negative. All other systems reviewed and are negative. Physical Exam     Visit Vitals  BP (!) 119/59   Pulse 93   Temp 99 °F (37.2 °C)   Resp 18   SpO2 100%         Physical Exam  Vitals signs and nursing note reviewed. Constitutional:       General: She is not in acute distress. Appearance: Normal appearance. She is well-developed. She is ill-appearing. She is not toxic-appearing or diaphoretic.    HENT:      Head: Normocephalic and atraumatic. Mouth/Throat:      Pharynx: No oropharyngeal exudate. Eyes:      General: No scleral icterus. Conjunctiva/sclera: Conjunctivae normal.      Pupils: Pupils are equal, round, and reactive to light. Neck:      Musculoskeletal: Normal range of motion and neck supple. Thyroid: No thyromegaly. Vascular: No hepatojugular reflux or JVD. Trachea: No tracheal deviation. Cardiovascular:      Rate and Rhythm: Normal rate and regular rhythm. Pulses: Normal pulses. Radial pulses are 2+ on the right side and 2+ on the left side. Dorsalis pedis pulses are 2+ on the right side and 2+ on the left side. Heart sounds: Normal heart sounds, S1 normal and S2 normal. No murmur. No gallop. No S3 or S4 sounds. Pulmonary:      Effort: Pulmonary effort is normal. No respiratory distress. Breath sounds: Normal breath sounds. No decreased breath sounds, wheezing, rhonchi or rales. Abdominal:      General: Bowel sounds are normal. There is distension. Palpations: Abdomen is soft. Abdomen is not rigid. There is hepatomegaly. There is no shifting dullness, fluid wave, splenomegaly, mass or pulsatile mass. Tenderness: There is abdominal tenderness. There is no guarding or rebound. Positive signs include Choi's sign. Negative signs include McBurney's sign. Hernia: No hernia is present. Musculoskeletal: Normal range of motion. Comments: Strength 4 out of 5 throughout. Lymphadenopathy:      Head:      Right side of head: No submental, submandibular, preauricular or occipital adenopathy. Left side of head: No submental, submandibular, preauricular or occipital adenopathy. Cervical: No cervical adenopathy. Upper Body:      Right upper body: No supraclavicular adenopathy. Left upper body: No supraclavicular adenopathy. Skin:     General: Skin is warm and dry. Findings: No rash.    Neurological:      Mental Status: She is alert. She is not disoriented. GCS: GCS eye subscore is 4. GCS verbal subscore is 5. GCS motor subscore is 6. Cranial Nerves: No cranial nerve deficit. Sensory: No sensory deficit. Coordination: Coordination normal.      Gait: Gait normal.      Deep Tendon Reflexes: Reflexes are normal and symmetric. Comments: Grossly intact. Psychiatric:         Speech: Speech normal.         Behavior: Behavior normal.         Thought Content: Thought content normal.         Judgment: Judgment normal.           Diagnostic Study Results     Labs -  Recent Results (from the past 12 hour(s))   CBC WITH AUTOMATED DIFF    Collection Time: 02/23/21  2:50 PM   Result Value Ref Range    WBC 23.0 (H) 4.6 - 13.2 K/uL    RBC 2.45 (L) 4.20 - 5.30 M/uL    HGB 9.4 (L) 12.0 - 16.0 g/dL    HCT 27.3 (L) 35.0 - 45.0 %    .4 (H) 74.0 - 97.0 FL    MCH 38.4 (H) 24.0 - 34.0 PG    MCHC 34.4 31.0 - 37.0 g/dL    RDW 16.1 (H) 11.6 - 14.5 %    PLATELET 311 418 - 559 K/uL    MPV 11.0 9.2 - 11.8 FL    NEUTROPHILS 77 (H) 42 - 75 %    BAND NEUTROPHILS 8 (H) 0 - 5 %    LYMPHOCYTES 10 (L) 20 - 51 %    MONOCYTES 3 2 - 9 %    EOSINOPHILS 1 0 - 5 %    BASOPHILS 0 0 - 3 %    METAMYELOCYTES 1 (H) 0 %    ABS. NEUTROPHILS 19.6 (H) 1.8 - 8.0 K/UL    ABS. LYMPHOCYTES 2.3 0.8 - 3.5 K/UL    ABS. MONOCYTES 0.7 0 - 1.0 K/UL    ABS. EOSINOPHILS 0.2 0.0 - 0.4 K/UL    ABS.  BASOPHILS 0.0 0.0 - 0.06 K/UL    DF MANUAL      PLATELET COMMENTS ADEQUATE PLATELETS      RBC COMMENTS ANISOCYTOSIS  1+       METABOLIC PANEL, COMPREHENSIVE    Collection Time: 02/23/21  2:50 PM   Result Value Ref Range    Sodium 141 136 - 145 mmol/L    Potassium 3.1 (L) 3.5 - 5.5 mmol/L    Chloride 100 100 - 111 mmol/L    CO2 27 21 - 32 mmol/L    Anion gap 14 3.0 - 18 mmol/L    Glucose 124 (H) 74 - 99 mg/dL    BUN 25 (H) 7.0 - 18 MG/DL    Creatinine 1.83 (H) 0.6 - 1.3 MG/DL    BUN/Creatinine ratio 14 12 - 20      GFR est AA 35 (L) >60 ml/min/1.73m2    GFR est non-AA 29 (L) >60 ml/min/1.73m2    Calcium 8.6 8.5 - 10.1 MG/DL    Bilirubin, total 5.4 (H) 0.2 - 1.0 MG/DL    ALT (SGPT) 15 13 - 56 U/L    AST (SGOT) 128 (H) 10 - 38 U/L    Alk. phosphatase 147 (H) 45 - 117 U/L    Protein, total 8.6 (H) 6.4 - 8.2 g/dL    Albumin 2.1 (L) 3.4 - 5.0 g/dL    Globulin 6.5 (H) 2.0 - 4.0 g/dL    A-G Ratio 0.3 (L) 0.8 - 1.7     LIPASE    Collection Time: 02/23/21  2:50 PM   Result Value Ref Range    Lipase 437 (H) 73 - 393 U/L   AMMONIA    Collection Time: 02/23/21  2:50 PM   Result Value Ref Range    Ammonia 33 (H) 11 - 32 UMOL/L   BILIRUBIN, DIRECT    Collection Time: 02/23/21  2:50 PM   Result Value Ref Range    Bilirubin, direct 2.4 (H) 0.0 - 0.2 MG/DL   POC LACTIC ACID    Collection Time: 02/23/21  4:45 PM   Result Value Ref Range    Lactic Acid (POC) 2.46 (HH) 0.40 - 2.00 mmol/L       Radiologic Studies -   CT HEAD WO CONT   Final Result   :      1. Negative head CT            CT ABD PELV WO CONT   Final Result   : There is evidence for acute cholecystitis with gallstones. Gallstones have   developed since 2019. Continued presence of diffuse liver infiltration with fat. Spleen mildly   enlarged 13.5 cm. Umbilical vein has reached cannulated with collaterals around the umbilicus   suggesting raised portal pressures. Medical Decision Making   Provider Notes (Medical Decision Making):  MDM  Number of Diagnoses or Management Options  Diagnosis management comments: DDX: Gastritis, gerd, peptic ulcer disease, cholecystitis, pancreatitis, gastroenteritis, hepatitis, constipation related pain, appendicitis pain, diverticulitis, urinary tract infection, obstruction, abdominal wall pain, atypical cardiac (ami or anginal pain), referred pain from pulmonary process (pneumonia, empyema), ectopic pregnancy,  or combination of the above versus many other processes. I am the first provider for this patient.     I reviewed the vital signs, available nursing notes, past medical history, past surgical history, family history and social history. Vital Signs-Reviewed the patient's vital signs. Records Reviewed: Nursing Notes (Time of Review: 4:44 PM)    ED Course: Progress Notes, Reevaluation, and Consults:  Labs essentially normal with the exception of WBC of 23, hemoglobin of 9.4, 8 bands, and a 1.8. Total bilirubin of 5.4, elevated liver enzymes, lipase 437, direct bili 2.4   CT head showed no acute process. CT abd showed acute cholecystitis  4:44 PM 2/23/2021    Consult:  Discussed care with Dr Doni Ramos. Standard discussion; including history of patients chief complaint, available diagnostic results, and treatment course. Would like the patient admitted to hospitalist.  He will follow in consultation. No surgery at this time. 4:54 PM    Consult:  Discussed care with Dr Felicity Harris. Hospitalist. Standard discussion; including history of patients chief complaint, available diagnostic results, and treatment course. Agrees with admission. 5:11 PM    CRITICAL CARE:  5:13 PM  I have spent 30 minutes of critical care time involved in lab review, consultations with specialist, family decision-making, and documentation. During this entire length of time I was immediately available to the patient. Critical Care: The reason for providing this level of medical care for this critically ill patient was due a critical illness that impaired one or more vital organ systems such that there was a high probability of imminent or life threatening deterioration in the patients condition. This care involved high complexity decision making to assess, manipulate, and support vital system functions, to treat this degreee vital organ system failure and to prevent further life threatening deterioration of the patients condition.'    Diagnosis       Clinical Impression:   1. Acute cholecystitis    2. Elevated bilirubin    3. Bandemia    4. Elevated lactic acid level    5.  Hypokalemia Disposition: Admitted       Attestation        Provider Attestation:     I personally performed the services described in the documentation, reviewed the documentation and it accurately and completely records my words and actions utilizing the [address] [place] February 23, 2021 at 5:14 PM - [place], [address]. It is dictated using utilizing voice recognition software. Unfortunately this leads to occasional typographical errors. I apologize in advance if the situation occurs. If questions arise please do not hesitate to contact me or call our department.

## 2021-02-23 NOTE — ED TRIAGE NOTES
Patient sent by her PCP for elevated LFT's and billirubin. They are concerned about possible ETOH intoxication as well. They reported to EMS that it has been an ongoing problem. Patient denies drinking alcohol and states she has only  Been drinking tea all week. She states she does not do drugs. Patient is A&O but seems somewhat disoriented. She denies any pain.

## 2021-02-24 LAB
ALBUMIN SERPL-MCNC: 1.9 G/DL (ref 3.4–5)
ALBUMIN/GLOB SERPL: 0.4 {RATIO} (ref 0.8–1.7)
ALP SERPL-CCNC: 125 U/L (ref 45–117)
ALT SERPL-CCNC: 12 U/L (ref 13–56)
AMMONIA PLAS-SCNC: 86 UMOL/L (ref 11–32)
ANION GAP SERPL CALC-SCNC: 10 MMOL/L (ref 3–18)
AST SERPL-CCNC: 108 U/L (ref 10–38)
BILIRUB SERPL-MCNC: 4.8 MG/DL (ref 0.2–1)
BUN SERPL-MCNC: 19 MG/DL (ref 7–18)
BUN/CREAT SERPL: 12 (ref 12–20)
CALCIUM SERPL-MCNC: 7.4 MG/DL (ref 8.5–10.1)
CHLORIDE SERPL-SCNC: 101 MMOL/L (ref 100–111)
CO2 SERPL-SCNC: 20 MMOL/L (ref 21–32)
CREAT SERPL-MCNC: 1.63 MG/DL (ref 0.6–1.3)
ERYTHROCYTE [DISTWIDTH] IN BLOOD BY AUTOMATED COUNT: 16.9 % (ref 11.6–14.5)
FERRITIN SERPL-MCNC: 1397 NG/ML (ref 8–388)
FOLATE SERPL-MCNC: 11 NG/ML (ref 3.1–17.5)
GLOBULIN SER CALC-MCNC: 5.4 G/DL (ref 2–4)
GLUCOSE SERPL-MCNC: 142 MG/DL (ref 74–99)
HCT VFR BLD AUTO: 23.9 % (ref 35–45)
HGB BLD-MCNC: 8.4 G/DL (ref 12–16)
INR PPP: 1.7 (ref 0.8–1.2)
IRON SATN MFR SERPL: 74 % (ref 20–50)
IRON SERPL-MCNC: 77 UG/DL (ref 50–175)
LACTATE SERPL-SCNC: 2.8 MMOL/L (ref 0.4–2)
LIPASE SERPL-CCNC: 571 U/L (ref 73–393)
MAGNESIUM SERPL-MCNC: 1.8 MG/DL (ref 1.6–2.6)
MCH RBC QN AUTO: 38.2 PG (ref 24–34)
MCHC RBC AUTO-ENTMCNC: 35.1 G/DL (ref 31–37)
MCV RBC AUTO: 108.6 FL (ref 74–97)
PHOSPHATE SERPL-MCNC: 1 MG/DL (ref 2.5–4.9)
PLATELET # BLD AUTO: 317 K/UL (ref 135–420)
PMV BLD AUTO: 10.6 FL (ref 9.2–11.8)
POTASSIUM SERPL-SCNC: 2.8 MMOL/L (ref 3.5–5.5)
PROT SERPL-MCNC: 7.3 G/DL (ref 6.4–8.2)
PROTHROMBIN TIME: 19.2 SEC (ref 11.5–15.2)
RBC # BLD AUTO: 2.2 M/UL (ref 4.2–5.3)
SODIUM SERPL-SCNC: 131 MMOL/L (ref 136–145)
TIBC SERPL-MCNC: 104 UG/DL (ref 250–450)
VIT B12 SERPL-MCNC: 1177 PG/ML (ref 211–911)
WBC # BLD AUTO: 24.7 K/UL (ref 4.6–13.2)

## 2021-02-24 PROCEDURE — 83735 ASSAY OF MAGNESIUM: CPT

## 2021-02-24 PROCEDURE — 82607 VITAMIN B-12: CPT

## 2021-02-24 PROCEDURE — 36415 COLL VENOUS BLD VENIPUNCTURE: CPT

## 2021-02-24 PROCEDURE — 77030038269 HC DRN EXT URIN PURWCK BARD -A

## 2021-02-24 PROCEDURE — 65660000000 HC RM CCU STEPDOWN

## 2021-02-24 PROCEDURE — 83540 ASSAY OF IRON: CPT

## 2021-02-24 PROCEDURE — 2709999900 HC NON-CHARGEABLE SUPPLY

## 2021-02-24 PROCEDURE — 74011250636 HC RX REV CODE- 250/636: Performed by: INTERNAL MEDICINE

## 2021-02-24 PROCEDURE — 82140 ASSAY OF AMMONIA: CPT

## 2021-02-24 PROCEDURE — 83690 ASSAY OF LIPASE: CPT

## 2021-02-24 PROCEDURE — 83605 ASSAY OF LACTIC ACID: CPT

## 2021-02-24 PROCEDURE — 80053 COMPREHEN METABOLIC PANEL: CPT

## 2021-02-24 PROCEDURE — 74011000258 HC RX REV CODE- 258: Performed by: INTERNAL MEDICINE

## 2021-02-24 PROCEDURE — 85027 COMPLETE CBC AUTOMATED: CPT

## 2021-02-24 PROCEDURE — 74011000250 HC RX REV CODE- 250: Performed by: INTERNAL MEDICINE

## 2021-02-24 PROCEDURE — 84100 ASSAY OF PHOSPHORUS: CPT

## 2021-02-24 PROCEDURE — 74011250637 HC RX REV CODE- 250/637: Performed by: INTERNAL MEDICINE

## 2021-02-24 PROCEDURE — 99222 1ST HOSP IP/OBS MODERATE 55: CPT | Performed by: INTERNAL MEDICINE

## 2021-02-24 PROCEDURE — 85610 PROTHROMBIN TIME: CPT

## 2021-02-24 PROCEDURE — 82728 ASSAY OF FERRITIN: CPT

## 2021-02-24 RX ORDER — ONDANSETRON 2 MG/ML
4 INJECTION INTRAMUSCULAR; INTRAVENOUS
Status: DISCONTINUED | OUTPATIENT
Start: 2021-02-24 | End: 2021-02-25 | Stop reason: HOSPADM

## 2021-02-24 RX ORDER — POLYETHYLENE GLYCOL 3350 17 G/17G
17 POWDER, FOR SOLUTION ORAL DAILY PRN
Status: DISCONTINUED | OUTPATIENT
Start: 2021-02-24 | End: 2021-02-25 | Stop reason: HOSPADM

## 2021-02-24 RX ORDER — ASPIRIN 81 MG/1
81 TABLET ORAL DAILY
COMMUNITY

## 2021-02-24 RX ORDER — PROMETHAZINE HYDROCHLORIDE 25 MG/1
12.5 TABLET ORAL
Status: DISCONTINUED | OUTPATIENT
Start: 2021-02-24 | End: 2021-02-25 | Stop reason: HOSPADM

## 2021-02-24 RX ORDER — ACETAMINOPHEN 650 MG/1
650 SUPPOSITORY RECTAL
Status: DISCONTINUED | OUTPATIENT
Start: 2021-02-24 | End: 2021-02-25 | Stop reason: HOSPADM

## 2021-02-24 RX ORDER — SODIUM CHLORIDE 0.9 % (FLUSH) 0.9 %
5-40 SYRINGE (ML) INJECTION AS NEEDED
Status: DISCONTINUED | OUTPATIENT
Start: 2021-02-24 | End: 2021-02-25 | Stop reason: HOSPADM

## 2021-02-24 RX ORDER — FOLIC ACID 1 MG/1
1 TABLET ORAL DAILY
Status: DISCONTINUED | OUTPATIENT
Start: 2021-02-24 | End: 2021-02-25 | Stop reason: HOSPADM

## 2021-02-24 RX ORDER — LORAZEPAM 2 MG/ML
3 INJECTION INTRAMUSCULAR
Status: DISCONTINUED | OUTPATIENT
Start: 2021-02-24 | End: 2021-02-25 | Stop reason: HOSPADM

## 2021-02-24 RX ORDER — LANOLIN ALCOHOL/MO/W.PET/CERES
100 CREAM (GRAM) TOPICAL DAILY
Status: DISCONTINUED | OUTPATIENT
Start: 2021-02-24 | End: 2021-02-25 | Stop reason: HOSPADM

## 2021-02-24 RX ORDER — LORAZEPAM 2 MG/ML
1 INJECTION INTRAMUSCULAR
Status: DISCONTINUED | OUTPATIENT
Start: 2021-02-24 | End: 2021-02-25 | Stop reason: HOSPADM

## 2021-02-24 RX ORDER — LORAZEPAM 2 MG/ML
2 INJECTION INTRAMUSCULAR
Status: DISCONTINUED | OUTPATIENT
Start: 2021-02-24 | End: 2021-02-25 | Stop reason: HOSPADM

## 2021-02-24 RX ORDER — ACETAMINOPHEN 325 MG/1
650 TABLET ORAL
Status: DISCONTINUED | OUTPATIENT
Start: 2021-02-24 | End: 2021-02-25 | Stop reason: HOSPADM

## 2021-02-24 RX ORDER — LORAZEPAM 1 MG/1
1 TABLET ORAL
Status: DISCONTINUED | OUTPATIENT
Start: 2021-02-24 | End: 2021-02-25 | Stop reason: HOSPADM

## 2021-02-24 RX ORDER — THERA TABS 400 MCG
1 TAB ORAL DAILY
Status: DISCONTINUED | OUTPATIENT
Start: 2021-02-24 | End: 2021-02-25 | Stop reason: HOSPADM

## 2021-02-24 RX ORDER — SODIUM CHLORIDE 0.9 % (FLUSH) 0.9 %
5-40 SYRINGE (ML) INJECTION EVERY 8 HOURS
Status: DISCONTINUED | OUTPATIENT
Start: 2021-02-24 | End: 2021-02-25 | Stop reason: HOSPADM

## 2021-02-24 RX ORDER — LORAZEPAM 1 MG/1
2 TABLET ORAL
Status: DISCONTINUED | OUTPATIENT
Start: 2021-02-24 | End: 2021-02-25 | Stop reason: HOSPADM

## 2021-02-24 RX ADMIN — PIPERACILLIN SODIUM AND TAZOBACTAM SODIUM 3.38 G: 3; .375 INJECTION, POWDER, LYOPHILIZED, FOR SOLUTION INTRAVENOUS at 04:22

## 2021-02-24 RX ADMIN — THERA TABS 1 TABLET: TAB at 09:06

## 2021-02-24 RX ADMIN — Medication 10 ML: at 21:39

## 2021-02-24 RX ADMIN — FAMOTIDINE 20 MG: 10 INJECTION INTRAVENOUS at 21:39

## 2021-02-24 RX ADMIN — FOLIC ACID 1 MG: 1 TABLET ORAL at 09:06

## 2021-02-24 RX ADMIN — FAMOTIDINE 20 MG: 10 INJECTION INTRAVENOUS at 04:22

## 2021-02-24 RX ADMIN — POTASSIUM BICARBONATE 40 MEQ: 782 TABLET, EFFERVESCENT ORAL at 05:45

## 2021-02-24 RX ADMIN — LACTULOSE 30 ML: 20 SOLUTION ORAL at 21:00

## 2021-02-24 RX ADMIN — LORAZEPAM 2 MG: 2 INJECTION INTRAMUSCULAR; INTRAVENOUS at 02:30

## 2021-02-24 RX ADMIN — Medication 20 ML: at 14:00

## 2021-02-24 RX ADMIN — PIPERACILLIN SODIUM AND TAZOBACTAM SODIUM 3.38 G: 3; .375 INJECTION, POWDER, LYOPHILIZED, FOR SOLUTION INTRAVENOUS at 22:05

## 2021-02-24 RX ADMIN — POTASSIUM CHLORIDE: 2 INJECTION, SOLUTION, CONCENTRATE INTRAVENOUS at 04:20

## 2021-02-24 RX ADMIN — POTASSIUM CHLORIDE: 2 INJECTION, SOLUTION, CONCENTRATE INTRAVENOUS at 19:40

## 2021-02-24 RX ADMIN — ACETAMINOPHEN 650 MG: 325 TABLET ORAL at 20:30

## 2021-02-24 RX ADMIN — PIPERACILLIN SODIUM AND TAZOBACTAM SODIUM 3.38 G: 3; .375 INJECTION, POWDER, LYOPHILIZED, FOR SOLUTION INTRAVENOUS at 11:56

## 2021-02-24 RX ADMIN — Medication 10 ML: at 05:48

## 2021-02-24 RX ADMIN — LACTULOSE 30 ML: 20 SOLUTION ORAL at 14:00

## 2021-02-24 RX ADMIN — FAMOTIDINE 20 MG: 10 INJECTION INTRAVENOUS at 09:05

## 2021-02-24 RX ADMIN — Medication 100 MG: at 09:05

## 2021-02-24 RX ADMIN — Medication 10 ML: at 02:00

## 2021-02-24 RX ADMIN — PIPERACILLIN SODIUM AND TAZOBACTAM SODIUM 3.38 G: 3; .375 INJECTION, POWDER, LYOPHILIZED, FOR SOLUTION INTRAVENOUS at 18:23

## 2021-02-24 RX ADMIN — LACTULOSE 30 ML: 20 SOLUTION ORAL at 09:06

## 2021-02-24 NOTE — ED NOTES
TRANSFER - OUT REPORT:    Telephone Verbal report given to Ftaoumata WILLINGHAM(name) on Amber Kingsley  being transferred to [address] [place] [address](unit) for routine progression of care       Report consisted of patients Situation, Background, Assessment and   Recommendations(SBAR). Information from the following report(s) SBAR, Kardex, ED Summary, Procedure Summary, Intake/Output, MAR and Accordion was reviewed with the receiving nurse. Opportunity for questions and clarification was provided.       Patient transported with:   Monitor  [facility]

## 2021-02-24 NOTE — PROGRESS NOTES
Reason for Admission:  Acute cholecystitis [K81.0]  Bandemia [D72.825]  Leukocytosis [D72.829]  Hypokalemia [E87.6]                 RUR Score:    17            Plan for utilizing home health:    n/a                      Likelihood of Readmission:   LOW                         Transition of Care Plan:              Initial assessment completed with patient. Cognitive status of patient: oriented to time, place, person and situation. Face sheet information confirmed:  yes. Updated address and PCP The patient designates no one to participate in her discharge plan and to receive any needed information. This patient lives in a single family home with patient and other:  alone. Patient is able to navigate steps as needed. Prior to hospitalization, patient was considered to be independent with ADLs/IADLS : yes . Patient has a current ACP document on file: no  The patient and other:  Friend or family? ? Pt wanting to leave AMA. Notified pt can not do Medicaid cab if leaving AMA will be available to transport patient home upon discharge. The patient already has none reported, and  medical equipment available in the home. Patient is not currently active with home health. Patient has not stayed in a skilled nursing facility or rehab. Was  stay within last 60 days : no. This patient is on dialysis :no      . Currently, the discharge plan is Home. The patient states that she can obtain her medications from the pharmacy, and take her medications as directed. Patient's current insurance is [facility]                Care Management Interventions  PCP Verified by CM:  Yes  Mode of Transport at Discharge: Self  Transition of Care Consult (CM Consult): Discharge Planning  Current Support Network: Lives Alone  Confirm Follow Up Transport: Other (see comment)  The Plan for Transition of Care is Related to the Following Treatment Goals : home  Discharge Location  Discharge Placement: Home     Viktoriya Kumar RN LATOYAN  Outcomes Manager    Pager # [phone]

## 2021-02-24 NOTE — ROUTINE PROCESS
Bedside and Verbal shift change report given to [address] (oncoming nurse) by Merlyn Pike RN (offgoing nurse). Report included the following information SBAR, Kardex, Intake/Output, MAR, Recent Results and Cardiac Rhythm ST. Patient sleeping at this time. No signs of distress. Call light and bedside commode in reach.

## 2021-02-24 NOTE — PROGRESS NOTES
Pt spoke with MD. She will be calling her ride and signing  out AMA. Pt stated she will let us know.

## 2021-02-24 NOTE — ROUTINE PROCESS
TRANSFER - IN REPORT:    Verbal report received from  Shola RN (name) on Lorena Contikers  being received from [facility](unit) for routine progression of care      Report consisted of patients Situation, Background, Assessment and   Recommendations(SBAR). Information from the following report(s) SBAR, Kardex, Intake/Output, MAR and Recent Results was reviewed with the receiving nurse. Opportunity for questions and clarification was provided. Assessment completed upon patients arrival to unit and care assumed. Patient arrival to the unit at  0100 am. Assessment done. Patient on a ClWA protocol. Patient resting in bed. Will continue to monitor for any more changes.

## 2021-02-24 NOTE — PROGRESS NOTES
1100 Pt walking around room and using the toilet. No tremoers, H/A's, No N/V and  no complaints voiced at this time. 1230 Pt up in room still says she is leaving and waiting on her ride.

## 2021-02-24 NOTE — CONSULTS
[facility]  General Surgery    Subjective:      HPI: Patient is a very pleasant female with a past medical history remarkable for hypertension, anemia, depression, alcohol abuse, and psychiatric disorder. Patient presented to the emergency department with complaints of several days of abdominal pain and nausea vomiting. I was asked to see the patient by the hospitalist staff regarding gallstones and hyperbilirubinemia. She states that she has noticed she has had gallstones for several years. When asked her why no surgery have been performed she stated because she did not want surgery. I asked her if it was okay if I continue to see her she said that was fine. I explained to the patient that her bilirubin was elevated which was likely secondary to direct compression of the gallbladder on the biliary duct or a stone within the bile duct. She seemed very indifferent and agitated.     Patient Active Problem List    Diagnosis Date Noted    Acute cholecystitis 02/23/2021    Hypokalemia 02/23/2021    Leukocytosis 02/23/2021    Bandemia 02/23/2021    MDD (major depressive disorder), recurrent severe, without psychosis ([address] 10/04/2019    Depression 10/03/2019    Anemia 07/16/2019    Hypotension 07/16/2019    Heavy alcohol use 07/16/2019    UTI (urinary tract infection) 07/16/2019    Dehydration 07/15/2019    Acute renal failure (ARF) ([address] 07/15/2019    Hyponatremia 07/15/2019    History of alcohol use 07/15/2019    Alcohol use disorder, severe, dependence ([address] 05/07/2015    Alcohol abuse counseling and surveillance 11/04/2013    HTN (hypertension) 11/19/2012     Past Medical History:   Diagnosis Date    Acute renal failure (ARF) ([address] 7/15/2019    Hypertension     Hyponatremia 7/15/2019    Other ill-defined conditions(799.89)     high cholesterol    Psychiatric disorder       Past Surgical History:   Procedure Laterality Date    IMPLANT BREAST SILICONE/EQ        Family History   Problem Relation Age of Onset    Stroke Mother     Diabetes Mother     Hypertension Father     Diabetes Father     Cancer Paternal Uncle       Social History     Tobacco Use    Smoking status: Never Smoker    Smokeless tobacco: Never Used   Substance Use Topics    Alcohol use: Yes      Allergies   Allergen Reactions    Sulfa (Sulfonamide Antibiotics) Rash       Prior to Admission medications    Medication Sig Start Date End Date Taking? Authorizing Provider   lisinopril (PRINIVIL, ZESTRIL) 20 mg tablet Take 1 Tab by mouth daily. Indications: high blood pressure 10/9/19  Yes Susan Weir MD   hydroCHLOROthiazide (HYDRODIURIL) 25 mg tablet Take 1 Tab by mouth daily. 7/19/19  Yes Delgado Scherer MD       Review of Systems:    14 systems were reviewed. The results are as above in the HPI and otherwise negative. Objective:     Vitals:    02/23/21 1448 02/23/21 1500 02/23/21 1647 02/23/21 1915   BP: 132/77 129/63 (!) 119/59 123/64   Pulse: 93   (!) 101   Resp: 18   24   Temp: 99 °F (37.2 °C)   98.8 °F (37.1 °C)   SpO2: 100%   97%       Physical Exam:  GENERAL: alert, cooperative, no distress, appears stated age,   EYE: Jaundice is present. PERRL, EOM's intact. THROAT & NECK: normal and no erythema or exudates noted. ,    LYMPHATIC: Cervical, supraclavicular, and axillary nodes normal. ,   LUNG: clear to auscultation bilaterally,   HEART: regular rate and rhythm, S1, S2 normal, no murmur, click, rub or gallop,   ABDOMEN: soft, non-tender. Bowel sounds normal. No masses,  no organomegaly,   EXTREMITIES:  extremities normal, atraumatic, no cyanosis or edema,   SKIN: Normal.,   NEUROLOGIC: AOx3. Cranial nerves 2-12 and sensation grossly intact. ,     Data Review:   Recent Results (from the past 24 hour(s))   CBC WITH AUTOMATED DIFF    Collection Time: 02/23/21  2:50 PM   Result Value Ref Range    WBC 23.0 (H) 4.6 - 13.2 K/uL    RBC 2.45 (L) 4.20 - 5.30 M/uL    HGB 9.4 (L) 12.0 - 16.0 g/dL HCT 27.3 (L) 35.0 - 45.0 %    .4 (H) 74.0 - 97.0 FL    MCH 38.4 (H) 24.0 - 34.0 PG    MCHC 34.4 31.0 - 37.0 g/dL    RDW 16.1 (H) 11.6 - 14.5 %    PLATELET 073 865 - 980 K/uL    MPV 11.0 9.2 - 11.8 FL    NEUTROPHILS 77 (H) 42 - 75 %    BAND NEUTROPHILS 8 (H) 0 - 5 %    LYMPHOCYTES 10 (L) 20 - 51 %    MONOCYTES 3 2 - 9 %    EOSINOPHILS 1 0 - 5 %    BASOPHILS 0 0 - 3 %    METAMYELOCYTES 1 (H) 0 %    ABS. NEUTROPHILS 19.6 (H) 1.8 - 8.0 K/UL    ABS. LYMPHOCYTES 2.3 0.8 - 3.5 K/UL    ABS. MONOCYTES 0.7 0 - 1.0 K/UL    ABS. EOSINOPHILS 0.2 0.0 - 0.4 K/UL    ABS. BASOPHILS 0.0 0.0 - 0.06 K/UL    DF MANUAL      PLATELET COMMENTS ADEQUATE PLATELETS      RBC COMMENTS ANISOCYTOSIS  1+       METABOLIC PANEL, COMPREHENSIVE    Collection Time: 02/23/21  2:50 PM   Result Value Ref Range    Sodium 141 136 - 145 mmol/L    Potassium 3.1 (L) 3.5 - 5.5 mmol/L    Chloride 100 100 - 111 mmol/L    CO2 27 21 - 32 mmol/L    Anion gap 14 3.0 - 18 mmol/L    Glucose 124 (H) 74 - 99 mg/dL    BUN 25 (H) 7.0 - 18 MG/DL    Creatinine 1.83 (H) 0.6 - 1.3 MG/DL    BUN/Creatinine ratio 14 12 - 20      GFR est AA 35 (L) >60 ml/min/1.73m2    GFR est non-AA 29 (L) >60 ml/min/1.73m2    Calcium 8.6 8.5 - 10.1 MG/DL    Bilirubin, total 5.4 (H) 0.2 - 1.0 MG/DL    ALT (SGPT) 15 13 - 56 U/L    AST (SGOT) 128 (H) 10 - 38 U/L    Alk.  phosphatase 147 (H) 45 - 117 U/L    Protein, total 8.6 (H) 6.4 - 8.2 g/dL    Albumin 2.1 (L) 3.4 - 5.0 g/dL    Globulin 6.5 (H) 2.0 - 4.0 g/dL    A-G Ratio 0.3 (L) 0.8 - 1.7     LIPASE    Collection Time: 02/23/21  2:50 PM   Result Value Ref Range    Lipase 437 (H) 73 - 393 U/L   AMMONIA    Collection Time: 02/23/21  2:50 PM   Result Value Ref Range    Ammonia 33 (H) 11 - 32 UMOL/L   BILIRUBIN, DIRECT    Collection Time: 02/23/21  2:50 PM   Result Value Ref Range    Bilirubin, direct 2.4 (H) 0.0 - 0.2 MG/DL   PROTHROMBIN TIME + INR    Collection Time: 02/23/21  2:50 PM   Result Value Ref Range    Prothrombin time 18.2 (H) 11.5 - 15.2 sec    INR 1.5 (H) 0.8 - 1.2     ETHYL ALCOHOL    Collection Time: 02/23/21  2:50 PM   Result Value Ref Range    ALCOHOL(ETHYL),SERUM <3 0 - 3 MG/DL   MAGNESIUM    Collection Time: 02/23/21  2:50 PM   Result Value Ref Range    Magnesium 2.0 1.6 - 2.6 mg/dL   POC LACTIC ACID    Collection Time: 02/23/21  4:45 PM   Result Value Ref Range    Lactic Acid (POC) 2.46 (HH) 0.40 - 2.00 mmol/L       Impression:     · Patient with acute calculus cholecystitis with hyperbilirubinemia 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 Plan:     · Suggest MRCP to assess for common bile duct stone.   · Continue present care    Signed By: Maryruth Dakin, MD     February 23, 2021

## 2021-02-24 NOTE — ED NOTES
Pt still unable to provide urine sample, still on PureWick connected to low suction machine. Will continue to monitor.

## 2021-02-24 NOTE — PROGRESS NOTES
[facility]  Progress Note    Patient: Mima Perez Age: 63 y.o. : 1965 MR#: [id number] SSN: [id number]  Date/Time: 2021     Subjective:     Patient seen and evaluated, lying in bed, no acute distress. Explained to patient that her CT abdomen pelvis showed that she has acute cholecystitis and her lab work is consistent with elevated LFTs. Patient is not sure why she was sent to the emergency room. She mentions she would like to leave since she is not having any abdominal pain and is not interested in having any surgery. Assessment/Plan:     1. Sepsis-secondary to acute cholecystitis, continue IV antibiotics  2. Elevated LFTs likely secondary to alcohol abuse-patient mentions that she quit drinking alcohol 2 weeks ago. 3. Acute cholecystitis as seen on CT abdomen and pelvis-General surgery on board, patient mentions she does not wish to get any surgery and would like to leave AMA. 4. Hypokalemia-repleting  5. SUYAPA-continue IVF, monitor creatinine. 6. History of alcohol abuse-continue CIWA protocol  7. Anemia of chronic disease-likely secondary to alcohol abuse-monitor H&H, transfuse for hemoglobin less than 7.   DVT prophylaxis-SCDs  Full code          Case discussed with:  [x]Patient  []Family  [x]Nursing  []Case Management  DVT Prophylaxis:  []Lovenox  []Hep SQ  []SCDs  []Coumadin   []On Heparin gtt    Objective:   VS:   Visit Vitals  BP (!) 152/78 (BP 1 Location: Left upper arm, BP Patient Position: At rest)   Pulse (!) 109   Temp 98.8 °F (37.1 °C)   Resp 21   Wt 77.3 kg (170 lb 8 oz)   SpO2 98%   Breastfeeding Unknown   BMI 32.22 kg/m²      Tmax/24hrs: Temp (24hrs), Av.6 °F (37 °C), Min:97.6 °F (36.4 °C), Max:99 °F (37.2 °C)  IOBRIEF    Intake/Output Summary (Last 24 hours) at 2021 1206  Last data filed at 2021 2300  Gross per 24 hour   Intake 3100 ml   Output --   Net 3100 ml       General:  Alert, cooperative, no acute distress HEENT: PERRLA, anicteric sclerae. Pulmonary:  CTA Bilaterally. No Wheezing/Rhonchi/Rales. Cardiovascular: Regular rate and Rhythm. GI:  Soft, Non distended, Non tender. + Bowel sounds. Extremities:  No edema, cyanosis, clubbing. No calf tenderness. Neurologic: Alert and oriented X 3. No acute neuro deficits.   Additional:    Medications:   Current Facility-Administered Medications   Medication Dose Route Frequency    dextrose 5% lactated ringers 1,000 mL with potassium chloride 20 mEq infusion   IntraVENous CONTINUOUS    famotidine (PF) (PEPCID) 20 mg in 0.9% sodium chloride 10 mL injection  20 mg IntraVENous Q12H    sodium chloride (NS) flush 5-40 mL  5-40 mL IntraVENous PRN    acetaminophen (TYLENOL) tablet 650 mg  650 mg Oral Q6H PRN    Or    acetaminophen (TYLENOL) suppository 650 mg  650 mg Rectal Q6H PRN    polyethylene glycol (MIRALAX) packet 17 g  17 g Oral DAILY PRN    promethazine (PHENERGAN) tablet 12.5 mg  12.5 mg Oral Q6H PRN    Or    ondansetron (ZOFRAN) injection 4 mg  4 mg IntraVENous Q6H PRN    sodium chloride (NS) flush 5-40 mL  5-40 mL IntraVENous Q8H    sodium chloride (NS) flush 5-40 mL  5-40 mL IntraVENous PRN    LORazepam (ATIVAN) tablet 1 mg  1 mg Oral Q1H PRN    Or    LORazepam (ATIVAN) injection 1 mg  1 mg IntraVENous Q1H PRN    LORazepam (ATIVAN) tablet 2 mg  2 mg Oral Q1H PRN    Or    LORazepam (ATIVAN) injection 2 mg  2 mg IntraVENous Q1H PRN    LORazepam (ATIVAN) injection 3 mg  3 mg IntraVENous [id number] PRN    folic acid (FOLVITE) tablet 1 mg  1 mg Oral DAILY    thiamine HCL (B-1) tablet 100 mg  100 mg Oral DAILY    therapeutic multivitamin (THERAGRAN) tablet 1 Tab  1 Tab Oral DAILY    lactulose (CHRONULAC) 10 gram/15 mL solution 30 mL  30 mL Oral TID    sodium chloride (NS) flush 5-10 mL  5-10 mL IntraVENous PRN    piperacillin-tazobactam (ZOSYN) 3.375 g in 0.9% sodium chloride (MBP/ADV) 100 mL MBP  3.375 g IntraVENous Q6H       Imaging:   XR Results (most recent):  Results from [place] encounter on 08/19/19   XR SPINE LUMB 2 OR 3 V    Narrative EXAMINATION: Lumbar spine 3 views    INDICATION: Lumbar pain    COMPARISON: None    FINDINGS: 3 views of the lumbar spine obtained. Vertebral body heights  preserved. Mild L5-S1 disc space loss. Multilevel mild chronic appearing  endplate changes. No focal listhesis. Lumbar lordosis maintained. Multilevel  lumbar facet arthropathy. Suspected foraminal stenoses at L5-S1. No acute  fracture identified. Mild SI joint degenerative changes. Mild bilateral hip degenerative changes. Impression IMPRESSION:    Degenerative changes as above. CT Results (most recent):  Results from Hospital Encounter encounter on 02/23/21   CT ABD PELV WO CONT    Narrative EXAM: CT ABD PELV WO CONT    CLINICAL INDICATION/HISTORY: elevated labs increased LFTs and bilirubin    TECHNIQUE: Contiguous axial images were obtained through the abdomen and pelvis. From these, sagittal and coronal reconstructions were generated. Contrast Used: None    CT scans at this facility are performed using dose optimization technique as  appropriate with performed exam, to include automated exposure control,  adjustment of mA and/or kV according to patient's size (including appropriate  matching for site-specific examinations), or use of iterative reconstruction  technique. COMPARISON: Ultrasound 2019    FINDINGS:        Lower chest: Some minimal atelectatic changes in the inferior portion of the  right middle lobe this may represent small amount of nonspecific scarring. Cardiac silhouette is top normal size. Myocardium is visualized question anemia. The hemoglobin and hematocrit decreased from the February 23 CBC    Liver: Fatty liver changes identified left 31, right 34 Hounsfield units.  This  is decreased in amount    Fatty changes are also suggested on the 2019 ultrasound additionally the  umbilical vein appears to have revascularized suggesting presentation of raised  intraportal pressures. Gallbladder/biliary: Bladder is abnormal there now stones not seen on the  previous ultrasound    In addition the wall is diffusely thickened and poorly defined consistent with  acute cholecystitis. Common bile duct does not appear dilated no evidence of stone ductal system is  suggested on this exam Biliary tree unremarkable. Spleen: Enlarged 13.5 cm nonspecific accessory splenule identified image 36  series 2    Pancreas: Unremarkable. Left Kidney: Unremarkable. Right Kidney: Unremarkable. Adrenals: Unremarkable. Bowels/mesentery: There is some submucosal fat deposition in the cecum in the  ascending portion of the colon this suggest some chronic inflammatory bowel  disease  Appendix: Identified on image 72 series 2 not inflamed    Retroperitoneal Spaces: No intraperitoneal free air or free fluid. No fluid  collection. Pelvis: Urinary bladder unremarkable. Vascular: Aorta unremarkable for age. IVC unremarkable. Lymph Nodes: No adenopathy. Bones: Minor degenerative changes at the L5-S1 level facets and disc. Impression :    There is evidence for acute cholecystitis with gallstones. Gallstones have  developed since 2019. Continued presence of diffuse liver infiltration with fat. Spleen mildly  enlarged 13.5 cm. Umbilical vein has reached cannulated with collaterals around the umbilicus  suggesting raised portal pressures.                  Labs:    Recent Results (from the past 48 hour(s))   CBC WITH AUTOMATED DIFF    Collection Time: 02/23/21  2:50 PM   Result Value Ref Range    WBC 23.0 (H) 4.6 - 13.2 K/uL    RBC 2.45 (L) 4.20 - 5.30 M/uL    HGB 9.4 (L) 12.0 - 16.0 g/dL    HCT 27.3 (L) 35.0 - 45.0 %    .4 (H) 74.0 - 97.0 FL    MCH 38.4 (H) 24.0 - 34.0 PG    MCHC 34.4 31.0 - 37.0 g/dL    RDW 16.1 (H) 11.6 - 14.5 %    PLATELET 138 712 - 889 K/uL    MPV 11.0 9.2 - 11.8 FL    NEUTROPHILS 77 (H) 42 - 75 %    BAND NEUTROPHILS 8 (H) 0 - 5 %    LYMPHOCYTES 10 (L) 20 - 51 %    MONOCYTES 3 2 - 9 %    EOSINOPHILS 1 0 - 5 %    BASOPHILS 0 0 - 3 %    METAMYELOCYTES 1 (H) 0 %    ABS. NEUTROPHILS 19.6 (H) 1.8 - 8.0 K/UL    ABS. LYMPHOCYTES 2.3 0.8 - 3.5 K/UL    ABS. MONOCYTES 0.7 0 - 1.0 K/UL    ABS. EOSINOPHILS 0.2 0.0 - 0.4 K/UL    ABS. BASOPHILS 0.0 0.0 - 0.06 K/UL    DF MANUAL      PLATELET COMMENTS ADEQUATE PLATELETS      RBC COMMENTS ANISOCYTOSIS  1+       METABOLIC PANEL, COMPREHENSIVE    Collection Time: 02/23/21  2:50 PM   Result Value Ref Range    Sodium 141 136 - 145 mmol/L    Potassium 3.1 (L) 3.5 - 5.5 mmol/L    Chloride 100 100 - 111 mmol/L    CO2 27 21 - 32 mmol/L    Anion gap 14 3.0 - 18 mmol/L    Glucose 124 (H) 74 - 99 mg/dL    BUN 25 (H) 7.0 - 18 MG/DL    Creatinine 1.83 (H) 0.6 - 1.3 MG/DL    BUN/Creatinine ratio 14 12 - 20      GFR est AA 35 (L) >60 ml/min/1.73m2    GFR est non-AA 29 (L) >60 ml/min/1.73m2    Calcium 8.6 8.5 - 10.1 MG/DL    Bilirubin, total 5.4 (H) 0.2 - 1.0 MG/DL    ALT (SGPT) 15 13 - 56 U/L    AST (SGOT) 128 (H) 10 - 38 U/L    Alk.  phosphatase 147 (H) 45 - 117 U/L    Protein, total 8.6 (H) 6.4 - 8.2 g/dL    Albumin 2.1 (L) 3.4 - 5.0 g/dL    Globulin 6.5 (H) 2.0 - 4.0 g/dL    A-G Ratio 0.3 (L) 0.8 - 1.7     LIPASE    Collection Time: 02/23/21  2:50 PM   Result Value Ref Range    Lipase 437 (H) 73 - 393 U/L   AMMONIA    Collection Time: 02/23/21  2:50 PM   Result Value Ref Range    Ammonia 33 (H) 11 - 32 UMOL/L   BILIRUBIN, DIRECT    Collection Time: 02/23/21  2:50 PM   Result Value Ref Range    Bilirubin, direct 2.4 (H) 0.0 - 0.2 MG/DL   PROTHROMBIN TIME + INR    Collection Time: 02/23/21  2:50 PM   Result Value Ref Range    Prothrombin time 18.2 (H) 11.5 - 15.2 sec    INR 1.5 (H) 0.8 - 1.2     ETHYL ALCOHOL    Collection Time: 02/23/21  2:50 PM   Result Value Ref Range    ALCOHOL(ETHYL),SERUM <3 0 - 3 MG/DL   MAGNESIUM    Collection Time: 02/23/21  2:50 PM   Result Value Ref Range    Magnesium 2.0 1.6 - 2.6 mg/dL   POC LACTIC ACID    Collection Time: 02/23/21  4:45 PM   Result Value Ref Range    Lactic Acid (POC) 2.46 (HH) 0.40 - 2.00 mmol/L   CULTURE, BLOOD    Collection Time: 02/23/21  4:47 PM    Specimen: Blood   Result Value Ref Range    Special Requests: NO SPECIAL REQUESTS  RIGHT  HAND        Culture result: NO GROWTH AFTER 13 HOURS     CULTURE, BLOOD    Collection Time: 02/23/21  4:47 PM    Specimen: Blood   Result Value Ref Range    Special Requests: NO SPECIAL REQUESTS  RIGHT  Antecubital        Culture result: NO GROWTH AFTER 13 HOURS     SARS-COV-2    Collection Time: 02/23/21  7:37 PM   Result Value Ref Range    SARS-CoV-2 Please find results under separate order     COVID-19 RAPID TEST    Collection Time: 02/23/21  7:37 PM   Result Value Ref Range    Specimen source Nasopharyngeal      COVID-19 rapid test Not detected NOTD     POC LACTIC ACID    Collection Time: 02/23/21  7:54 PM   Result Value Ref Range    Lactic Acid (POC) 2.07 (HH) 0.40 - 2.00 mmol/L   CBC W/O DIFF    Collection Time: 02/24/21  3:38 AM   Result Value Ref Range    WBC 24.7 (H) 4.6 - 13.2 K/uL    RBC 2.20 (L) 4.20 - 5.30 M/uL    HGB 8.4 (L) 12.0 - 16.0 g/dL    HCT 23.9 (L) 35.0 - 45.0 %    .6 (H) 74.0 - 97.0 FL    MCH 38.2 (H) 24.0 - 34.0 PG    MCHC 35.1 31.0 - 37.0 g/dL    RDW 16.9 (H) 11.6 - 14.5 %    PLATELET 881 851 - 110 K/uL    MPV 10.6 9.2 - 25.9 FL   METABOLIC PANEL, COMPREHENSIVE    Collection Time: 02/24/21  3:38 AM   Result Value Ref Range    Sodium 131 (L) 136 - 145 mmol/L    Potassium 2.8 (LL) 3.5 - 5.5 mmol/L    Chloride 101 100 - 111 mmol/L    CO2 20 (L) 21 - 32 mmol/L    Anion gap 10 3.0 - 18 mmol/L    Glucose 142 (H) 74 - 99 mg/dL    BUN 19 (H) 7.0 - 18 MG/DL    Creatinine 1.63 (H) 0.6 - 1.3 MG/DL    BUN/Creatinine ratio 12 12 - 20      GFR est AA 40 (L) >60 ml/min/1.73m2    GFR est non-AA 33 (L) >60 ml/min/1.73m2    Calcium 7.4 (L) 8.5 - 10.1 MG/DL    Bilirubin, total 4.8 (H) 0.2 - 1.0 MG/DL    ALT (SGPT) 12 (L) 13 - 56 U/L    AST (SGOT) 108 (H) 10 - 38 U/L    Alk. phosphatase 125 (H) 45 - 117 U/L    Protein, total 7.3 6.4 - 8.2 g/dL    Albumin 1.9 (L) 3.4 - 5.0 g/dL    Globulin 5.4 (H) 2.0 - 4.0 g/dL    A-G Ratio 0.4 (L) 0.8 - 1.7     MAGNESIUM    Collection Time: 02/24/21  3:38 AM   Result Value Ref Range    Magnesium 1.8 1.6 - 2.6 mg/dL   PHOSPHORUS    Collection Time: 02/24/21  3:38 AM   Result Value Ref Range    Phosphorus 1.0 (L) 2.5 - 4.9 MG/DL   LACTIC ACID    Collection Time: 02/24/21  3:38 AM   Result Value Ref Range    Lactic acid 2.8 (HH) 0.4 - 2.0 MMOL/L   IRON PROFILE    Collection Time: 02/24/21  3:38 AM   Result Value Ref Range    Iron 77 50 - 175 ug/dL    TIBC 104 (L) 250 - 450 ug/dL    Iron % saturation 74 (H) 20 - 50 %   VITAMIN B12 & FOLATE    Collection Time: 02/24/21  3:38 AM   Result Value Ref Range    Vitamin B12 1,177 (H) 211 - 911 pg/mL    Folate 11.0 3.10 - 17.50 ng/mL   FERRITIN    Collection Time: 02/24/21  3:38 AM   Result Value Ref Range    Ferritin 1,397 (H) 8 - 388 NG/ML   LIPASE    Collection Time: 02/24/21  3:38 AM   Result Value Ref Range    Lipase 571 (H) 73 - 393 U/L   PROTHROMBIN TIME + INR    Collection Time: 02/24/21  3:38 AM   Result Value Ref Range    Prothrombin time 19.2 (H) 11.5 - 15.2 sec    INR 1.7 (H) 0.8 - 1.2     AMMONIA    Collection Time: 02/24/21  3:38 AM   Result Value Ref Range    Ammonia 86 (H) 11 - 32 UMOL/L       Signed By: Adam Calvo MD     February 24, 2021      I spent 35 minutes with the patient in face-to-face consultation, of which greater than 50% was spent in counseling and coordination of care as described above    Disclaimer: Sections of this note are dictated using utilizing voice recognition software.  Minor typographical errors may be present. If questions arise, please do not hesitate to contact me or call our department.

## 2021-02-24 NOTE — PROGRESS NOTES
Problem: Falls - Risk of  Goal: *Absence of Falls  Description: Document Doni Shayan Fall Risk and appropriate interventions in the flowsheet.   Outcome: Progressing Towards Goal  Note: Fall Risk Interventions:  Mobility Interventions: Bed/chair exit alarm    Mentation Interventions: More frequent rounding, Evaluate medications/consider consulting pharmacy    Medication Interventions: Bed/chair exit alarm    Elimination Interventions: Bed/chair exit alarm

## 2021-02-24 NOTE — PROGRESS NOTES
History & Physical    Patient: Ale Barney MRN: [id number]  CSN: [id number]    YOB: 1965  Age: 63 y.o. Sex: female      DOA: 2/23/2021  CC: abnormal lab    PCP: Sven Walton MD       HPI:     Ale Barney is a 63 y.o. female with medical co-morbidities including HTN, chronic alcohol use, hypercholesterolemia, presented to [facility] ER because her PCP concern for elevated LFTs. Apparently, she report abdominal pain in the ER but no nausea/vomiting. Her last alcohol drink was reported 2 days ago. In the ER, she was found to have elevated LFTs, elevated bilirubin, leukocytosis, elevated lactic acid. CT abd/pelv with evidence of acute cholecystitis. She was started on zosyn, IV fluid. General surgery consult from ER for further care. She is currently wants to eat, she stated that she does not want surgery         Review of Systems  GENERAL: No fever, No chill, + malaise   HEENT: No change in vision, no ear ache, no sore throat or sinus congestion. NECK: No pain or stiffness. PULMONARY: No shortness of breath, no cough or wheeze. Cardiovascular: no pnd / orthopnea, no Chest Pain  GASTROINTESTINAL: resolved abd pain, No nausea/vomiting, No diarrhea, No bright red blood per rectum. GENITOURINARY: ++ urinary frequency, No urgency or pain with urination. MUSCULOSKELETAL: No joint or muscle pain, no back pain, no recent trauma. DERMATOLOGIC: No rash, no itching, no lesions. ENDOCRINE: No polyuria, polydipsia, No recent change in weight. HEMATOLOGICAL: No easy bruising or bleeding.    NEUROLOGIC: No headache, No seizures, No generalized weakness         Past Medical History:   Diagnosis Date    Acute renal failure (ARF) ([address] 7/15/2019    Hypertension     Hyponatremia 7/15/2019    Other ill-defined conditions(799.89)     high cholesterol    Psychiatric disorder        Past Surgical History:   Procedure Laterality Date    IMPLANT BREAST SILICONE/EQ         Family History Problem Relation Age of Onset    Stroke Mother     Diabetes Mother     Hypertension Father     Diabetes Father     Cancer Paternal Uncle        Social History     Socioeconomic History    Marital status:      Spouse name: Not on file    Number of children: Not on file    Years of education: Not on file    Highest education level: Not on file   Tobacco Use    Smoking status: Never Smoker    Smokeless tobacco: Never Used   Substance and Sexual Activity    Alcohol use: Yes    Drug use: No    Sexual activity: Never       Prior to Admission medications    Medication Sig Start Date End Date Taking? Authorizing Provider   lisinopril (PRINIVIL, ZESTRIL) 20 mg tablet Take 1 Tab by mouth daily. Indications: high blood pressure 10/9/19  Yes Bentley Lara MD   hydroCHLOROthiazide (HYDRODIURIL) 25 mg tablet Take 1 Tab by mouth daily. 7/19/19  Yes Wendy Heredia MD       Allergies   Allergen Reactions    Sulfa (Sulfonamide Antibiotics) Rash              Physical Exam:      Visit Vitals  BP (!) 123/58   Pulse (!) 102   Temp 98.7 °F (37.1 °C)   Resp 22   SpO2 98%       Physical Exam:  Tele: sinus tachycardia   General:  Cooperative, Not in acute distress, speaks in full sentence while in bed  HEENT: PERRL, EOMI, supple neck, no JVD, dry oral mucosa  Cardiovascular: S1S2 regular, no rub/gallop   Pulmonary: Clear air entry bilaterally, no wheezing, no crackle  GI:  Soft, + tender, non distended, +bs, no guarding   Extremities:  No pedal edema, +distal pulses appreciated   Neuro: AOx3, moving all extremities, no gross deficit.      Lab/Data Review:  Labs: Results:       Chemistry Recent Labs     02/23/21  1450   *      K 3.1*      CO2 27   BUN 25*   CREA 1.83*   CA 8.6   AGAP 14   BUCR 14   *   TP 8.6*   ALB 2.1*   GLOB 6.5*   AGRAT 0.3*      CBC w/Diff Recent Labs     02/23/21  1450   WBC 23.0*   RBC 2.45*   HGB 9.4*   HCT 27.3*      GRANS 77*   LYMPH 10*   EOS 1 Coagulation Recent Labs     02/23/21  1450   PTP 18.2*   INR 1.5*       Iron/Ferritin No results for input(s): IRON in the last 72 hours. No lab exists for component: TIBCCALC   BNP No results for input(s): BNPP in the last 72 hours. Cardiac Enzymes No results for input(s): CPK, CKND1, MICHELLE in the last 72 hours. No lab exists for component: CKRMB, TROIP   Liver Enzymes Recent Labs     02/23/21  1450   TP 8.6*   ALB 2.1*   *      Thyroid Studies Lab Results   Component Value Date/Time    TSH 1.75 10/05/2019 06:28 AM          All Micro Results     Procedure Component Value Units Date/Time    COVID-19 RAPID TEST [[id number]] Collected: 02/23/21 1937    Order Status: Completed Specimen: Nasopharyngeal Updated: 02/23/21 2145     Specimen source Nasopharyngeal        COVID-19 rapid test Not detected        Comment: Rapid Abbott ID Now       Rapid NAAT:  The specimen is NEGATIVE for SARS-CoV-2, the novel coronavirus associated with COVID-19. Negative results should be treated as presumptive and, if inconsistent with clinical signs and symptoms or necessary for patient management, should be tested with an alternative molecular assay. Negative results do not preclude SARS-CoV-2 infection and should not be used as the sole basis for patient management decisions. This test has been authorized by the FDA under an Emergency Use Authorization (EUA) for use by authorized laboratories.    Fact sheet for Healthcare Providers: ConventionUpdate.co.nz  Fact sheet for Patients: ConventionUpdate.co.nz       Methodology: Isothermal Nucleic Acid Amplification         CULTURE, BLOOD [[id number]] Collected: 02/23/21 1647    Order Status: Completed Specimen: Blood Updated: 02/23/21 1804    CULTURE, BLOOD [[id number]] Collected: 02/23/21 1647    Order Status: Completed Specimen: Blood Updated: 02/23/21 1804          Imaging Reviewed:  CT Results (most recent):  Results from [facility] [place]23[place]21   CT ABD PELV WO CONT    Narrative EXAM: CT ABD PELV WO CONT    CLINICAL INDICATION/HISTORY: elevated labs increased LFTs and bilirubin    TECHNIQUE: Contiguous axial images were obtained through the abdomen and pelvis. From these, sagittal and coronal reconstructions were generated. Contrast Used: None    CT scans at this facility are performed using dose optimization technique as  appropriate with performed exam, to include automated exposure control,  adjustment of mA and/or kV according to patient's size (including appropriate  matching for site-specific examinations), or use of iterative reconstruction  technique. COMPARISON: Ultrasound 2019    FINDINGS:        Lower chest: Some minimal atelectatic changes in the inferior portion of the  right middle lobe this may represent small amount of nonspecific scarring. Cardiac silhouette is top normal size. Myocardium is visualized question anemia. The hemoglobin and hematocrit decreased from the February 23 CBC    Liver: Fatty liver changes identified left 31, right 34 Hounsfield units. This  is decreased in amount    Fatty changes are also suggested on the 2019 ultrasound additionally the  umbilical vein appears to have revascularized suggesting presentation of raised  intraportal pressures. Gallbladder/biliary: Bladder is abnormal there now stones not seen on the  previous ultrasound    In addition the wall is diffusely thickened and poorly defined consistent with  acute cholecystitis. Common bile duct does not appear dilated no evidence of stone ductal system is  suggested on this exam Biliary tree unremarkable. Spleen: Enlarged 13.5 cm nonspecific accessory splenule identified image 36  series 2    Pancreas: Unremarkable. Left Kidney: Unremarkable. Right Kidney: Unremarkable. Adrenals: Unremarkable.     Bowels/mesentery: There is some submucosal fat deposition in the cecum in the  ascending portion of the colon this suggest some chronic inflammatory bowel  disease  Appendix: Identified on image 72 series 2 not inflamed    Retroperitoneal Spaces: No intraperitoneal free air or free fluid. No fluid  collection. Pelvis: Urinary bladder unremarkable. Vascular: Aorta unremarkable for age. IVC unremarkable. Lymph Nodes: No adenopathy. Bones: Minor degenerative changes at the L5-S1 level facets and disc. Impression :    There is evidence for acute cholecystitis with gallstones. Gallstones have  developed since 2019. Continued presence of diffuse liver infiltration with fat. Spleen mildly  enlarged 13.5 cm. Umbilical vein has reached cannulated with collaterals around the umbilicus  suggesting raised portal pressures. EKG Results     None              Assessment:   Active Problems:  1. Sepsis, poa, (leukocytosis, bandemia, tachycardia, acute cholecystitis)   2. Acute cholecystitis with gallstones   3. Elevated LFT's associate with acute cholecystitis and gallstone, fatty liver   4. Elevated INR, no bleeding   5. Hyperammonemia associate with liver disease   6. Acute renal insufficiency only, prerenal   7. Hypokalemia   8. Bandemia   9. Leukocytosis  10. Macrocytic anemia   11. Alcohol use, chronic, at risk for alcohol withdrawal   12. Negative covid-19 test   13. Hypertension   14. Hyperlipidemia   15. Lactic acidemia due to sepsis       Plan:     Admitted for further IV antibiotics and IV hydration. Blood culture follow up. Surgery consult follow up   Repeat daily labs, avoid nephrotoxic, hepatotoxic drugs   [facility] protocol  Replace electrolytes   Can advanced liquid diet since she does not want surgery   Can resume her lisinopril if renal function recovered   pepcid   Vitamin supplement placed.      Risk of deterioration:  []Low    [x]Moderate  []High     Prophylaxis:  []Lovenox  []Coumadin  []Hep SQ  [x]SCDs  []H2B/PPI     Disposition:  []Home w/ Family   [x]HH PT,OT,RN []SNF/LTC   []SAH/Rehab     Discussed Code Status:         [x]Full Code      []DNR         ___________________________________________________     Care Plan discussed with:    [x]Patient   []Family    []ED Care Manager  [x]ED Doc   []Specialist :  Total Time Coordinating Admission:  60   minutes    []Total Critical Care Time:       Rebekah Castañeda MD  2/24/2021, 12:59 AM

## 2021-02-25 VITALS
HEIGHT: 64 IN | WEIGHT: 170.5 LBS | TEMPERATURE: 98.1 F | OXYGEN SATURATION: 99 % | HEART RATE: 104 BPM | RESPIRATION RATE: 18 BRPM | BODY MASS INDEX: 29.11 KG/M2 | SYSTOLIC BLOOD PRESSURE: 148 MMHG | DIASTOLIC BLOOD PRESSURE: 65 MMHG

## 2021-02-25 PROCEDURE — 99239 HOSP IP/OBS DSCHRG MGMT >30: CPT | Performed by: HOSPITALIST

## 2021-02-25 PROCEDURE — 97165 OT EVAL LOW COMPLEX 30 MIN: CPT

## 2021-02-25 PROCEDURE — 74011250637 HC RX REV CODE- 250/637: Performed by: INTERNAL MEDICINE

## 2021-02-25 PROCEDURE — 97161 PT EVAL LOW COMPLEX 20 MIN: CPT

## 2021-02-25 PROCEDURE — 2709999900 HC NON-CHARGEABLE SUPPLY

## 2021-02-25 PROCEDURE — 74011250636 HC RX REV CODE- 250/636: Performed by: INTERNAL MEDICINE

## 2021-02-25 PROCEDURE — 97530 THERAPEUTIC ACTIVITIES: CPT

## 2021-02-25 PROCEDURE — 74011000258 HC RX REV CODE- 258: Performed by: INTERNAL MEDICINE

## 2021-02-25 RX ADMIN — Medication 10 ML: at 05:11

## 2021-02-25 NOTE — PROGRESS NOTES
Problem: Self Care Deficits Care Plan (Adult)  Goal: *Acute Goals and Plan of Care (Insert Text)  Outcome: Resolved/Met   OCCUPATIONAL THERAPY EVALUATION/DISCHARGE    Patient: Pacheco Humphrey (40 y.o. female)  Date: 2/25/2021  Primary Diagnosis: Acute cholecystitis [K81.0]  Bandemia [D72.825]  Leukocytosis [D72.829]  Hypokalemia [E87.6]        Precautions: Falls    PLOF: Pt reports she was (I) with basic self-care/ADLs and functional mobility without AD PTA. ASSESSMENT AND RECOMMENDATIONS:  Pt cleared to participate in OT evaluation by Rn. Upon entering room, pt supine with HOB elevated, alert, and agreeable to therapy session. Pt seen in conjunction with PT to increase pt participation and to maximize safety of patient and staff members. Based on the objective data described below, the patient presents she is (I) with basic self-care/ADLs. Pt denied pain, but reports difficulty transitioning in and out of bed due to hospital bed and abdomen. Pt educated on log roll technique to decrease pressure on abdomen, which pt was able to complete with supervision, in preparation for further self care. Pt dons new gown independently without dfficulty. Pt denied to use the bathroom but agreeable to ambulating  in room an down hallway, simulating toilet transfers. Pt performs functional mobility independently with no LOB noted. Pt voiced frustrations during session, also wanting pepsi/coke and food; provided therapeutic use of self/listening to build rapport. As pt presents she is at her baseline, skilled occupational therapy is not indicated at this time, therefore OT to d/c pt from caseload.     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\"; \"I want a coke\"; \"I want chicken and dumplings\"    OBJECTIVE DATA SUMMARY:     Past Medical History:   Diagnosis Date    Acute renal failure (ARF) ([facility] [address] 7/15/2019    Hypertension     Hyponatremia 7/15/2019    Other ill-defined conditions(799.89)     high cholesterol    Psychiatric disorder      Past Surgical History:   Procedure Laterality Date    IMPLANT BREAST SILICONE/EQ       Barriers to Learning/Limitations: yes;  emotional  Compensate with: visual, verbal, tactile, kinesthetic cues/model    Home Situation:   Home Situation  Home Environment: Apartment  # Steps to Enter: 20  One/Two Story Residence: One story  Living Alone: Yes  Support Systems: Family member(s)  Patient Expects to be Discharged to[de-identified] Apartment  Current DME Used/Available at Home: None  []     Right hand dominant   []     Left hand dominant    Cognitive/Behavioral Status:  Neurologic State: Alert  Orientation Level: Oriented to person;Oriented to place;Oriented to situation  Cognition: Follows commands  Safety/Judgement: Fall prevention    Skin: Visible skin appeared intact  Edema: None noted         Coordination: BUE  Coordination: Within functional limits  Fine Motor Skills-Upper: Left Intact; Right Intact    Gross Motor Skills-Upper: Left Intact; Right Intact    Balance:  Sitting: (P) Intact  Standing: (P) Without support  Standing - Static: (P) Good  Standing - Dynamic : (P) Good    Strength: BUE  Strength:  Within functional limits    Tone & Sensation: BUE  Tone: Normal  Sensation: Intact    Range of Motion: BUE  AROM: Within functional limits        Functional Mobility and Transfers for ADLs:  Bed Mobility:  Rolling: Supervision(log roll technique to decrease compression of abdomen when transitioning in/out of bed)  Supine to Sit: Supervision    Transfers:  Sit to Stand:  Independent  Stand to Sit: Independent      ADL Assessment:  Feeding: Independent    Oral Facial Hygiene/Grooming: Independent    Bathing: Independent    Upper Body Dressing: Independent    Lower Body Dressing: Independent    Toileting: Independent      ADL Intervention:  Cognitive Retraining  Safety/Judgement: Fall prevention    Pain:  Pain level pre-treatment: 0/10   Pain level post-treatment: 0/10   Pain Intervention(s): Medication (see MAR); Rest, Ice, Repositioning  Response to intervention: Nurse notified, See doc flow    Activity Tolerance:   Good    Please refer to the flowsheet for vital signs taken during this treatment. After treatment:   []  Patient left in no apparent distress sitting up in chair  [x]  Patient left in no apparent distress sitting edge of bed  [x]  Call bell left within reach  []  Nursing notified  [x]   present  []  Bed alarm activated    COMMUNICATION/EDUCATION:   [x]      Role of Occupational Therapy in the acute care setting  []      Home safety education was provided and the patient/caregiver indicated understanding. [x]      Patient/family have participated as able and agree with findings and recommendations. []      Patient is unable to participate in plan of care at this time. Thank you for this referral.  Hillary Salas MS, OTR/L  Time Calculation: 29 mins      Eval Complexity: History: MEDIUM Complexity : Expanded review of history including physical, cognitive and psychosocial  history ; Examination: LOW Complexity : 1-3 performance deficits relating to physical, cognitive , or psychosocial skils that result in activity limitations and / or participation restrictions ;    Decision Making:LOW Complexity : No comorbidities that affect functional and no verbal or physical assistance needed to complete eval tasks

## 2021-02-25 NOTE — H&P
Myron Blevins MD   Physician   Specialty:  Internal Medicine   Progress Notes   Signed   Date of Service:  02/24/21 0059               Expand AllCollapse All      []Hide copied text    []Kamla for details              History & Physical     Patient: Benjamín Roberts MRN: [id number]  CSN: [id number]    YOB: 1965  Age: 63 y.o. Sex: female       DOA: 2/23/2021  CC: abnormal lab     PCP: Felicitas Jarrell MD       HPI:      Benjamín Roberts is a 63 y.o. female with medical co-morbidities including HTN, chronic alcohol use, hypercholesterolemia, presented to [facility] ER because her PCP concern for elevated LFTs. Apparently, she report abdominal pain in the ER but no nausea/vomiting. Her last alcohol drink was reported 2 days ago. In the ER, she was found to have elevated LFTs, elevated bilirubin, leukocytosis, elevated lactic acid. CT abd/pelv with evidence of acute cholecystitis. She was started on zosyn, IV fluid. General surgery consult from ER for further care. She is currently wants to eat, she stated that she does not want surgery            Review of Systems  GENERAL: No fever, No chill, + malaise   HEENT: No change in vision, no ear ache, no sore throat or sinus congestion. NECK: No pain or stiffness. PULMONARY: No shortness of breath, no cough or wheeze. Cardiovascular: no pnd / orthopnea, no Chest Pain  GASTROINTESTINAL: resolved abd pain, No nausea/vomiting, No diarrhea, No bright red blood per rectum. GENITOURINARY: ++ urinary frequency, No urgency or pain with urination. MUSCULOSKELETAL: No joint or muscle pain, no back pain, no recent trauma. DERMATOLOGIC: No rash, no itching, no lesions. ENDOCRINE: No polyuria, polydipsia, No recent change in weight. HEMATOLOGICAL: No easy bruising or bleeding.    NEUROLOGIC: No headache, No seizures, No generalized weakness                 Past Medical History:   Diagnosis Date    Acute renal failure (ARF) ([facility] [address] 7/15/2019    Hypertension      Hyponatremia 7/15/2019    Other ill-defined conditions(799.89)       high cholesterol    Psychiatric disorder                 Past Surgical History:   Procedure Laterality Date    IMPLANT BREAST SILICONE/EQ                   Family History   Problem Relation Age of Onset    Stroke Mother      Diabetes Mother      Hypertension Father      Diabetes Father      Cancer Paternal Uncle           Social History               Socioeconomic History    Marital status:        Spouse name: Not on file    Number of children: Not on file    Years of education: Not on file    Highest education level: Not on file   Tobacco Use    Smoking status: Never Smoker    Smokeless tobacco: Never Used   Substance and Sexual Activity    Alcohol use: Yes    Drug use: No    Sexual activity: Never                    Prior to Admission medications    Medication Sig Start Date End Date Taking? Authorizing Provider   lisinopril (PRINIVIL, ZESTRIL) 20 mg tablet Take 1 Tab by mouth daily. Indications: high blood pressure 10/9/19   Yes Cecelia Del Cid MD   hydroCHLOROthiazide (HYDRODIURIL) 25 mg tablet Take 1 Tab by mouth daily.  7/19/19   Yes Bentley Johnson MD              Allergies   Allergen Reactions    Sulfa (Sulfonamide Antibiotics) Rash                 Physical Exam:      Visit Vitals  BP (!) 123/58   Pulse (!) 102   Temp 98.7 °F (37.1 °C)   Resp 22   SpO2 98%         Physical Exam:  Tele: sinus tachycardia   General:  Cooperative, Not in acute distress, speaks in full sentence while in bed  HEENT: PERRL, EOMI, supple neck, no JVD, dry oral mucosa  Cardiovascular: S1S2 regular, no rub/gallop   Pulmonary: Clear air entry bilaterally, no wheezing, no crackle  GI:  Soft, + tender, non distended, +bs, no guarding   Extremities:  No pedal edema, +distal pulses appreciated   Neuro: AOx3, moving all extremities, no gross deficit.      Lab/Data Review:  Labs: Results:         Chemistry     Recent Labs     02/23/21  1450 *      K 3.1*      CO2 27   BUN 25*   CREA 1.83*   CA 8.6   AGAP 14   BUCR 14   *   TP 8.6*   ALB 2.1*   GLOB 6.5*   AGRAT 0.3*       CBC w/Diff     Recent Labs     02/23/21  1450   WBC 23.0*   RBC 2.45*   HGB 9.4*   HCT 27.3*      GRANS 77*   LYMPH 10*   EOS 1       Coagulation     Recent Labs     02/23/21  1450   PTP 18.2*   INR 1.5*       Iron/Ferritin No results for input(s): IRON in the last 72 hours.     No lab exists for component: TIBCCALC   BNP No results for input(s): BNPP in the last 72 hours. Cardiac Enzymes No results for input(s): CPK, CKND1, MICHELLE in the last 72 hours.     No lab exists for component: CKRMB, TROIP   Liver Enzymes     Recent Labs     02/23/21  1450   TP 8.6*   ALB 2.1*   *       Thyroid Studies       Lab Results   Component Value Date/Time     TSH 1.75 10/05/2019 06:28 AM                    All Micro Results      Procedure Component Value Units Date/Time     COVID-19 RAPID TEST [[id number]] Collected: 02/23/21 1937     Order Status: Completed Specimen: Nasopharyngeal Updated: 02/23/21 2145       Specimen source Nasopharyngeal           COVID-19 rapid test Not detected           Comment: Rapid Abbott ID Now       Rapid NAAT:  The specimen is NEGATIVE for SARS-CoV-2, the novel coronavirus associated with COVID-19. Negative results should be treated as presumptive and, if inconsistent with clinical signs and symptoms or necessary for patient management, should be tested with an alternative molecular assay. Negative results do not preclude SARS-CoV-2 infection and should not be used as the sole basis for patient management decisions. This test has been authorized by the FDA under an Emergency Use Authorization (EUA) for use by authorized laboratories.    Fact sheet for Healthcare Providers: kstattoo.com  Fact sheet for Patients: kstattoo.com       Methodology: Isothermal Nucleic Acid Amplification           CULTURE, BLOOD [[id number]] Collected: 02/23/21 1647     Order Status: Completed Specimen: Blood Updated: 02/23/21 1804     CULTURE, BLOOD [[id number]] Collected: 02/23/21 1647     Order Status: Completed Specimen: Blood Updated: 02/23/21 1804             Imaging Reviewed:  CT Results (most recent):       Results from Hospital Encounter encounter on 02/23/21   CT ABD PELV WO CONT     Narrative EXAM: CT ABD PELV WO CONT     CLINICAL INDICATION/HISTORY: elevated labs increased LFTs and bilirubin     TECHNIQUE: Contiguous axial images were obtained through the abdomen and pelvis. From these, sagittal and coronal reconstructions were generated.     Contrast Used: None     CT scans at this facility are performed using dose optimization technique as  appropriate with performed exam, to include automated exposure control,  adjustment of mA and/or kV according to patient's size (including appropriate  matching for site-specific examinations), or use of iterative reconstruction  technique.     COMPARISON: Ultrasound 2019     FINDINGS:           Lower chest: Some minimal atelectatic changes in the inferior portion of the  right middle lobe this may represent small amount of nonspecific scarring. Cardiac silhouette is top normal size. Myocardium is visualized question anemia. The hemoglobin and hematocrit decreased from the February 23 CBC     Liver: Fatty liver changes identified left 31, right 34 Hounsfield units.  This  is decreased in amount     Fatty changes are also suggested on the 2019 ultrasound additionally the  umbilical vein appears to have revascularized suggesting presentation of raised  intraportal pressures.     Gallbladder/biliary: Bladder is abnormal there now stones not seen on the  previous ultrasound     In addition the wall is diffusely thickened and poorly defined consistent with  acute cholecystitis.     Common bile duct does not appear dilated no evidence of stone ductal system is  suggested on this exam Biliary tree unremarkable.     Spleen: Enlarged 13.5 cm nonspecific accessory splenule identified image 36  series 2     Pancreas: Unremarkable.     Left Kidney: Unremarkable.     Right Kidney: Unremarkable.     Adrenals: Unremarkable.     Bowels/mesentery: There is some submucosal fat deposition in the cecum in the  ascending portion of the colon this suggest some chronic inflammatory bowel  disease  Appendix: Identified on image 72 series 2 not inflamed     Retroperitoneal Spaces: No intraperitoneal free air or free fluid. No fluid  collection.     Pelvis: Urinary bladder unremarkable.     Vascular: Aorta unremarkable for age. IVC unremarkable.     Lymph Nodes: No adenopathy.     Bones: Minor degenerative changes at the L5-S1 level facets and disc.        Impression :     There is evidence for acute cholecystitis with gallstones. Gallstones have  developed since 2019.     Continued presence of diffuse liver infiltration with fat. Spleen mildly  enlarged 13.5 cm. Umbilical vein has reached cannulated with collaterals around the umbilicus  suggesting raised portal pressures.            EKG Results      None                   Assessment:   Active Problems:  1. Sepsis, poa, (leukocytosis, bandemia, tachycardia, acute cholecystitis)   2. Acute cholecystitis with gallstones   3. Elevated LFT's associate with acute cholecystitis and gallstone, fatty liver   4. Elevated INR, no bleeding   5. Hyperammonemia associate with liver disease   6. Acute renal insufficiency only, prerenal   7. Hypokalemia   8. Bandemia   9. Leukocytosis  10. Macrocytic anemia   11. Alcohol use, chronic, at risk for alcohol withdrawal   12. Negative covid-19 test   13. Hypertension   14. Hyperlipidemia   15. Lactic acidemia due to sepsis         Plan:      Admitted for further IV antibiotics and IV hydration. Blood culture follow up.    Surgery consult follow up   Repeat daily labs, avoid nephrotoxic, hepatotoxic drugs   CIWA protocol  Replace electrolytes   Can advanced liquid diet since she does not want surgery   Can resume her lisinopril if renal function recovered   pepcid   Vitamin supplement placed.      Risk of deterioration:  []? Low    [x]? Moderate  []? High     Prophylaxis:  []? Lovenox  []? Coumadin  []? Hep SQ  [x]? SCDs  []? H2B/PPI     Disposition:  []? Home w/ Family   [x]? HH PT,OT,RN   []? SNF/LTC   []? SAH/Rehab     Discussed Code Status:         [x]? Full Code      []? DNR         ___________________________________________________     Care Plan discussed with:    [x]? Patient   []? Family    []? ED Care Manager  [x]? ED Doc   []? Specialist :  Total Time Coordinating Admission:  60   minutes    []? Total Critical Care Time: Denis Beal MD  2/24/2021, 12:59 AM              Above note copied and pasted from admitting doctor's note dated 2/24/2021- Dr Armani Aquino, negrito -previous dictated H&P on same date is under heading of progress note so correction made in above note copy pasted under the heading of H&P.   This is done for admitting doctor by me

## 2021-02-25 NOTE — PROGRESS NOTES
MRI Safety Screening form needs to be filled out and FAXED to [phone] BEFORE MRI can be scheduled. If unable to acquire information from patient, [facility] must be contacted, or screening xrays will need to be ordred.     If pt is Claustrophobic or needs Pain Meds, please have these ordered in advance to help facilitate MRI exam.

## 2021-02-25 NOTE — PROGRESS NOTES
Patient requesting to leave AMA. No needs identified at this time. CM remains available if needed.      Claudine Mckenzie RN, BSN  Care Management  [phone]

## 2021-02-25 NOTE — PROGRESS NOTES
Spoke with patient at bedside, plan is to go home at discharge. Will continue to follow.      Carlos Eduardo Calderon RN, BSN   Care Management  [phone]

## 2021-02-25 NOTE — PROGRESS NOTES
Verbal report is given to Phani mayes RN. Patient is still in [address] [place] [address].

## 2021-02-25 NOTE — PROGRESS NOTES
Informed by nurse that patient wanted to leave AMA. Hospitalist discussed risks with patient who continued to insist on going home AMA. Completed AMA paperwork with patient and removed IV line. Per patient, ride will not be here until later in the afternoon. Will continue to monitor patient until discharge from unit.

## 2021-02-25 NOTE — PROGRESS NOTES
Patient received in room on [address]. Asleep at this time, easily rousable. Call bell and phone within reach. Will continue to monitor.

## 2021-02-25 NOTE — DISCHARGE SUMMARY
Discharge Summary    Patient: Amos Null MRN: [id number]  CSN: [id number]    YOB: 1965  Age: 63 y.o. Sex: female    DOA: 2/23/2021 LOS:  LOS: 2 days   Discharge Date:      Admission Diagnoses: Acute cholecystitis [K81.0]  Bandemia [D72.825]  Leukocytosis [D72.829]  Hypokalemia [E87.6]    Discharge Diagnoses:    Sepsis  Leukocytosis  Hypokalemia  SUYAPA  Acute cholecystitis  Elevated LFTs  Alcohol abuse      Discharge Condition: Stable    Consults: General Surgery    PHYSICAL EXAM  Visit Vitals  BP (!) 148/65 (BP 1 Location: Left upper arm, BP Patient Position: Sitting)   Pulse (!) 104   Temp 98.1 °F (36.7 °C)   Resp 18   Ht 5' 3.5\" (1.613 m)   Wt 77.3 kg (170 lb 8 oz)   SpO2 99%   Breastfeeding Unknown   BMI 29.73 kg/m²       General: Alert, cooperative, no acute distress    HEENT: PERRLA, EOMI. Anicteric sclerae. Lungs:  CTA Bilaterally. No Wheezing/Rhonchi/Rales. Heart:  Regular rate and Rhythm. Abdomen: Soft, Non distended, Non tender. + Bowel sounds. Extremities: No edema/ cyanosis/ clubbing  Psych:   Good insight. Not anxious or agitated. Neurologic:  AA oriented X 3. Moves all extremities. HPI:  Amos Null is a 63 y.o. female with medical co-morbidities including HTN, chronic alcohol use, hypercholesterolemia, presented to [facility] ER because her PCP concern for elevated LFTs. Apparently, she report abdominal pain in the ER but no nausea/vomiting. Her last alcohol drink was reported 2 days ago. In the ER, she was found to have elevated LFTs, elevated bilirubin, leukocytosis, elevated lactic acid. CT abd/pelv with evidence of acute cholecystitis. She was started on zosyn, IV fluid. General surgery consult from ER for further care.    She is currently wants to eat, she stated that she does not want surgery           Hospital Course:   Patient admitted to the hospital with sepsis, elevated LFTs, CT abdomen and pelvis showing acute cholecystitis, hypokalemia, leukocytosis. After being admitted to the hospital, patient refused treatment-she mentions that there is nothing on with her. She was seen by general surgery who recommended an MRCP however patient refused that test also. I tried to explain to the patient that she would benefit from IV antibiotics, further treatment for her acute cholecystitis which the patient refused. The nurse also explained to the patient that this could be detrimental to her health however patient refused all treatment. The patient signed out AMA. Imaging:  XR Results (most recent):  Results from Hospital Encounter encounter on 08/19/19   XR SPINE LUMB 2 OR 3 V    Narrative EXAMINATION: Lumbar spine 3 views    INDICATION: Lumbar pain    COMPARISON: None    FINDINGS: 3 views of the lumbar spine obtained. Vertebral body heights  preserved. Mild L5-S1 disc space loss. Multilevel mild chronic appearing  endplate changes. No focal listhesis. Lumbar lordosis maintained. Multilevel  lumbar facet arthropathy. Suspected foraminal stenoses at L5-S1. No acute  fracture identified. Mild SI joint degenerative changes. Mild bilateral hip degenerative changes. Impression IMPRESSION:    Degenerative changes as above. CT Results (most recent):  Results from Hospital Encounter encounter on 02/23/21   CT ABD PELV WO CONT    Narrative EXAM: CT ABD PELV WO CONT    CLINICAL INDICATION/HISTORY: elevated labs increased LFTs and bilirubin    TECHNIQUE: Contiguous axial images were obtained through the abdomen and pelvis. From these, sagittal and coronal reconstructions were generated.     Contrast Used: None    CT scans at this facility are performed using dose optimization technique as  appropriate with performed exam, to include automated exposure control,  adjustment of mA and/or kV according to patient's size (including appropriate  matching for site-specific examinations), or use of iterative reconstruction  technique. COMPARISON: Ultrasound 2019    FINDINGS:        Lower chest: Some minimal atelectatic changes in the inferior portion of the  right middle lobe this may represent small amount of nonspecific scarring. Cardiac silhouette is top normal size. Myocardium is visualized question anemia. The hemoglobin and hematocrit decreased from the February 23 CBC    Liver: Fatty liver changes identified left 31, right 34 Hounsfield units. This  is decreased in amount    Fatty changes are also suggested on the 2019 ultrasound additionally the  umbilical vein appears to have revascularized suggesting presentation of raised  intraportal pressures. Gallbladder/biliary: Bladder is abnormal there now stones not seen on the  previous ultrasound    In addition the wall is diffusely thickened and poorly defined consistent with  acute cholecystitis. Common bile duct does not appear dilated no evidence of stone ductal system is  suggested on this exam Biliary tree unremarkable. Spleen: Enlarged 13.5 cm nonspecific accessory splenule identified image 36  series 2    Pancreas: Unremarkable. Left Kidney: Unremarkable. Right Kidney: Unremarkable. Adrenals: Unremarkable. Bowels/mesentery: There is some submucosal fat deposition in the cecum in the  ascending portion of the colon this suggest some chronic inflammatory bowel  disease  Appendix: Identified on image 72 series 2 not inflamed    Retroperitoneal Spaces: No intraperitoneal free air or free fluid. No fluid  collection. Pelvis: Urinary bladder unremarkable. Vascular: Aorta unremarkable for age. IVC unremarkable. Lymph Nodes: No adenopathy. Bones: Minor degenerative changes at the L5-S1 level facets and disc. Impression :    There is evidence for acute cholecystitis with gallstones. Gallstones have  developed since 2019. Continued presence of diffuse liver infiltration with fat. Spleen mildly  enlarged 13.5 cm.   Umbilical vein has reached cannulated with collaterals around the umbilicus  suggesting raised portal pressures. Procedures:   None         Discharge Medications:     Current Discharge Medication List      CONTINUE these medications which have NOT CHANGED    Details   aspirin delayed-release 81 mg tablet Take 81 mg by mouth daily. lisinopril (PRINIVIL, ZESTRIL) 20 mg tablet Take 1 Tab by mouth daily. Indications: high blood pressure  Qty: 1 Tab, Refills: 0      hydroCHLOROthiazide (HYDRODIURIL) 25 mg tablet Take 1 Tab by mouth daily.   Qty: 30 Tab, Refills: 0            Current Facility-Administered Medications:     influenza vaccine 2020-21 (6 mos+)(PF) (FLUARIX/FLULAVAL/FLUZONE QUAD) injection 0.5 mL, 0.5 mL, IntraMUSCular, ONCE, Alexander Briceno MD    dextrose 5% lactated ringers 1,000 mL with potassium chloride 20 mEq infusion, , IntraVENous, CONTINUOUS, Amena Ledezma MD, Stopped at 02/25/21 0450    famotidine (PF) (PEPCID) 20 mg in 0.9% sodium chloride 10 mL injection, 20 mg, IntraVENous, Q12H, Hugo Doan MD, 20 mg at 02/24/21 2139    sodium chloride (NS) flush 5-40 mL, 5-40 mL, IntraVENous, PRN, Amena Ledezma MD    acetaminophen (TYLENOL) tablet 650 mg, 650 mg, Oral, Q6H PRN, 650 mg at 02/24/21 2030 **OR** acetaminophen (TYLENOL) suppository 650 mg, 650 mg, Rectal, Q6H PRN, Amena Ledezma MD    polyethylene glycol (MIRALAX) packet 17 g, 17 g, Oral, DAILY PRN, Amena Ledezma MD    promethazine (PHENERGAN) tablet 12.5 mg, 12.5 mg, Oral, Q6H PRN **OR** ondansetron (ZOFRAN) injection 4 mg, 4 mg, IntraVENous, Q6H PRN, Amena Ledezma MD    sodium chloride (NS) flush 5-40 mL, 5-40 mL, IntraVENous, Q8H, Hugo Doan MD, 10 mL at 02/25/21 0511    sodium chloride (NS) flush 5-40 mL, 5-40 mL, IntraVENous, PRN, Amena Ledezma MD    LORazepam (ATIVAN) tablet 1 mg, 1 mg, Oral, Q1H PRN **OR** LORazepam (ATIVAN) injection 1 mg, 1 mg, IntraVENous, Q1H PRN, Hugo Doan MD    LORazepam (ATIVAN) tablet 2 mg, 2 mg, Oral, Q1H PRN **OR** LORazepam (ATIVAN) injection 2 mg, 2 mg, IntraVENous, Q1H PRN, Hugo Romero MD, 2 mg at 02/24/21 0230    LORazepam (ATIVAN) injection 3 mg, 3 mg, IntraVENous, Q15MIN PRN, Robert Swann MD    folic acid (FOLVITE) tablet 1 mg, 1 mg, Oral, DAILY, Hugo Romero MD, 1 mg at 02/24/21 0906    thiamine HCL (B-1) tablet 100 mg, 100 mg, Oral, DAILY, Hugo Romero MD, 100 mg at 02/24/21 6998    therapeutic multivitamin (THERAGRAN) tablet 1 Tab, 1 Tab, Oral, DAILY, Robert Swann MD, 1 Tab at 02/24/21 0906    lactulose (CHRONULAC) 10 gram/15 mL solution 30 mL, 30 mL, Oral, TID, Robert Swann MD, 30 mL at 02/24/21 2100    sodium chloride (NS) flush 5-10 mL, 5-10 mL, IntraVENous, PRN, Robert Swann MD    piperacillin-tazobactam (ZOSYN) 3.375 g in 0.9% sodium chloride (MBP/ADV) 100 mL MBP, 3.375 g, IntraVENous, Q6H, Hugo Romero MD, Last Rate: 200 mL/hr at 02/24/21 2205, 3.375 g at 02/24/21 2205  · It is important that you take the medication exactly as they are prescribed. · Keep your medication in the bottles provided by the pharmacist and keep a list of the medication names, dosages, and times to be taken in your wallet. · Do not take other medications without consulting your doctor. Discharge To:  Home    Minutes spent on discharge: 35 minutes spent coordinating this discharge (review instructions/follow-up, prescriptions, preparing report for sign off)    Terri Braxton MD  2/25/2021 2:42 PM

## 2021-02-25 NOTE — PROGRESS NOTES
Patient refusing all morning medications. Dumped all pills into trash can and handed empty cup to nurse, stating \"there. \" Patient states \"I'm not taking no meds and I'm not doing surgery. \" Will continue to monitor.

## 2021-02-25 NOTE — PROGRESS NOTES
Problem: Mobility Impaired (Adult and Pediatric)  Goal: *Acute Goals and Plan of Care (Insert Text)  Outcome: Resolved/Met   PHYSICAL THERAPY EVALUATION AND DISCHARGE    Patient: Yon Munson (89 y.o. female)  Date: 2021  Primary Diagnosis: Acute cholecystitis [K81.0]  Bandemia [D72.825]  Leukocytosis [D72.829]  Hypokalemia [E87.6]        Precautions: fall       PLOF: pt reports being independent with mobility without an assistive device    ASSESSMENT :  Based on the objective data described below, the patient presents at independent level with ambulation and transfers after education. Pt initially require min A for supine to sit transfer, after education she transferred supine to sit using log roll technique independently. Overall patient has poor safety awareness and is very impulsive however this is not changed with education. Pt ambulated 300 feet without an assistive device with fluctuating speeds. Pt was left in the room with OT at the end of her evaluation. Patient does not require further skilled intervention at this level of care. PLAN :  Recommendations and Planned Interventions:   No formal PT needs identified at this time. Discharge Recommendations: None  Further Equipment Recommendations for Discharge: N/A     SUBJECTIVE:   Patient stated I know what happens when they take your gallbladder out. They took my friends out and then he turned yellow and .     OBJECTIVE DATA SUMMARY:     Past Medical History:   Diagnosis Date    Acute renal failure (ARF) ([facility] [address] 7/15/2019    Hypertension     Hyponatremia 7/15/2019    Other ill-defined conditions(799.89)     high cholesterol    Psychiatric disorder      Past Surgical History:   Procedure Laterality Date    IMPLANT BREAST SILICONE/EQ       Barriers to Learning/Limitations: None  Compensate with: N/A  Home Situation:   Home Situation  Home Environment: Apartment  # Steps to Enter: 20  One/Two Story Residence: One story  Living Alone: Yes  Support Systems: Family member(s)  Patient Expects to be Discharged to[de-identified] Apartment  Current DME Used/Available at Home: None  Critical Behavior:  Neurologic State: Alert  Orientation Level: Oriented to person;Oriented to place;Oriented to situation  Cognition: Follows commands  Safety/Judgement: Fall prevention     Strength:    Strength: Within functional limits(Simultaneous filing. User may not have seen previous data.)   Tone & Sensation:   Tone: Normal(Simultaneous filing. User may not have seen previous data.)   Sensation: Intact  Range Of Motion:  AROM: Within functional limits(Simultaneous filing. User may not have seen previous data.)       Functional Mobility:  Bed Mobility:   independent after education  Transfers:  Sit to Stand: Independent  Stand to Sit: Independent     Balance:   Sitting: Intact  Standing: Without support  Standing - Static: Good  Standing - Dynamic : Good     Ambulation/Gait Training:  Distance (ft): 300 Feet (ft)  Assistive Device: (none)  Ambulation - Level of Assistance: Independent  Speed/Dasha: Fluctuations     Pain:  Pain level pre-treatment: 0/10   Pain level post-treatment: 0/10  Pain Intervention(s): n/a    Activity Tolerance:   fair  Please refer to the flowsheet for vital signs taken during this treatment. After treatment:   [x]         Patient left in no apparent distress sitting up in chair  []         Patient left in no apparent distress in bed  [x]         Call bell left within reach  [x]         Nursing notified  []         Caregiver present  []         Bed alarm activated  []         SCDs applied    COMMUNICATION/EDUCATION:   [x]         Role of Physical Therapy in the acute care setting. [x]         Fall prevention education was provided and the patient/caregiver indicated understanding.   [x]         Patient/family have participated as able in goal setting and plan of care.-eval only  []         Patient/family agree to work toward stated goals and plan of care. []         Patient understands intent and goals of therapy, but is neutral about his/her participation. []         Patient is unable to participate in goal setting/plan of care: ongoing with therapy staff.  []         Other:     Thank you for this referral.  Bharathi Mariano, PT   Time Calculation: 25 mins      Eval Complexity: History: HIGH Complexity :3+ comorbidities / personal factors will impact the outcome/ POC Exam:MEDIUM Complexity : 3 Standardized tests and measures addressing body structure, function, activity limitation and / or participation in recreation  Presentation: MEDIUM Complexity : Evolving with changing characteristics  Clinical Decision Making:Low Overall Complexity:LOW

## 2021-02-25 NOTE — PROGRESS NOTES
Bedside handoff report given by Yasmine Naqvi (offgoing RN) to include SBAR, Kardex, MAR, Results Review, I/O's, Summary of Care, and Cardiac Rhythm.

## 2021-02-25 NOTE — ROUTINE PROCESS
TRANSFER - IN REPORT:    Verbal report received from [facility], RN(name) on Kaitlin Tracy  being received from [address] [place] [address](unit) for routine progression of care      Report consisted of patients Situation, Background, Assessment and   Recommendations(SBAR). Information from the following report(s) SBAR, Kardex, [facility] and Cardiac Rhythm Sinus Tachycardia was reviewed with the receiving nurse. Opportunity for questions and clarification was provided. Assessment completed upon patients arrival to unit and care assumed.

## 2021-02-25 NOTE — PROGRESS NOTES
Pt has been non-compliant throughout shift. Pt threw the tele monitor, refused labs and lactulose. Vitals WDL, IVF still stopped, pt was changed 4x throughout shift, pt denied pain, CIWA scores have been from 0-6, will continue to monitor pt closely. Called in report to [place], RN pt will be transferred to room#468.

## 2021-02-25 NOTE — PROGRESS NOTES
Patient was using expletives stating that she was waken up at 3 am in the morning to put on heart monitor  And there is nothing wrong with her heart. She then pulled it off and attempted to through it in the direction of RN, monitor was taken away. She later stated to her primary care nurse that she is tired of the heart monitor.

## 2021-02-25 NOTE — PROGRESS NOTES
Spoke with [employer], need screening form filled out for MRI. Bentley Beckham stated patient is very agitated and possibly unlikely she will want to get this done but will attempt to get screening form.

## 2021-02-25 NOTE — PROGRESS NOTES
Informed that Abdomen coil is down and ETA for repair is unknown.  Informed by RN that patient leaving AMA and she will cancel exam. -AF 02/25/21

## 2021-02-25 NOTE — PROGRESS NOTES
Patient seen and evaluated, sitting up in recliner, no acute distress. Patient mentions she never had any abdominal pain and has no idea why she is in the hospital.  Reviewed surgical consult and recommending MRCP which I have ordered however patient is refusing the test.  She is also refusing all blood draws. I explained to the patient that we are trying to help her, patient mentions she does not need any help and would like to leave now. Patient has already signed AMA paperwork per nursing staff and will leave once her ride is here.

## 2023-04-19 NOTE — CONSULTS
Consult Note  Consult requested by: Dr. Lacey Chatman is a 46 y.o. female [facility] who is being seen on consult for SUYAPA  Chief Complaint   Patient presents with    Hypotension    Vomiting    Diarrhea     Admission diagnosis: <principal problem not specified>     HPI: 45 yo  female admitted for 4 days of N/V/D, Denies any fever or urinary voiding complaints. She took 4 Imodiums this morning before coming to the ER. She is homeless and she has tried to hydrate as much as she could. Has received 3 L of NS so far and put out about 650 ml of urine. BP has improved some. She denies any abd pain, CP or SOB. Feels weak and shaky, claims no alcohol or illegal drug use. Past Medical History:   Diagnosis Date    Hypertension     Other ill-defined conditions(039.78)     high cholesterol    Psychiatric disorder       No past surgical history on file. Social History     Socioeconomic History    Marital status:      Spouse name: Not on file    Number of children: Not on file    Years of education: Not on file    Highest education level: Not on file   Occupational History    Not on file   Social Needs    Financial resource strain: Not on file    Food insecurity:     Worry: Not on file     Inability: Not on file    Transportation needs:     Medical: Not on file     Non-medical: Not on file   Tobacco Use    Smoking status: Never Smoker    Smokeless tobacco: Never Used   Substance and Sexual Activity    Alcohol use:  Yes    Drug use: No    Sexual activity: Never   Lifestyle    Physical activity:     Days per week: Not on file     Minutes per session: Not on file    Stress: Not on file   Relationships    Social connections:     Talks on phone: Not on file     Gets together: Not on file     Attends [employer] service: Not on file     Active member of club or organization: Not on file     Attends meetings of clubs or organizations: Not on file     Relationship status: Not on file    Intimate partner violence:     Fear of current or ex partner: Not on file     Emotionally abused: Not on file     Physically abused: Not on file     Forced sexual activity: Not on file   Other Topics Concern    Not on file   Social History Narrative    Not on file       Family History   Problem Relation Age of Onset    Stroke Mother     Diabetes Mother     Hypertension Father     Diabetes Father     Cancer Paternal Uncle      Allergies   Allergen Reactions    Sulfa (Sulfonamide Antibiotics) Rash        Home Medications:     Prior to Admission Medications   Prescriptions Last Dose Informant Patient Reported? Taking?   hydroCHLOROthiazide (HYDRODIURIL) 25 mg tablet   No No   Sig: Take 1 Tab by mouth daily. Indications: Edema, hypertension   multivitamin (ONE A DAY) tablet   Yes No   Sig: Take 1 Tab by mouth daily. omega-3 acid ethyl esters (LOVAZA) 1 gram capsule   No No   Sig: Take 2 Caps by mouth two (2) times a day. traZODone (DESYREL) 100 mg tablet   No No   Sig: Take 1 Tab by mouth nightly. Facility-Administered Medications: None       Current Facility-Administered Medications   Medication Dose Route Frequency    0.9% sodium chloride infusion  100 mL/hr IntraVENous CONTINUOUS    sodium chloride (NS) flush 5-10 mL  5-10 mL IntraVENous PRN    levoFLOXacin (LEVAQUIN) 750 mg in D5W IVPB  750 mg IntraVENous ONCE    [START ON 7/17/2019] levoFLOXacin (LEVAQUIN) 500 mg in D5W IVPB  500 mg IntraVENous Q48H    [START ON 7/16/2019] piperacillin-tazobactam (ZOSYN) 2.25 g in 0.9% sodium chloride (MBP/ADV) 50 mL MBP  2.25 g IntraVENous Q8H    VANCOMYCIN INFORMATION NOTE   Other CONTINUOUS    potassium chloride 10 mEq in 100 ml IVPB  10 mEq IntraVENous Q1H     Current Outpatient Medications   Medication Sig    traZODone (DESYREL) 100 mg tablet Take 1 Tab by mouth nightly.  hydroCHLOROthiazide (HYDRODIURIL) 25 mg tablet Take 1 Tab by mouth daily.  Indications: Edema, hypertension    omega-3 acid ethyl esters (LOVAZA) 1 gram capsule Take 2 Caps by mouth two (2) times a day.  multivitamin (ONE A DAY) tablet Take 1 Tab by mouth daily. Review of Systems:   Pertinent items are noted in HPI. Data Review:    Labs: Results:       Chemistry Recent Labs     07/15/19  1526   *   *   K 3.4*   CL 82*   CO2 26   BUN 61*   CREA 7.05*   CA 8.5   AGAP 14   BUCR 9*      TP 8.3*   ALB 4.1   GLOB 4.2*   AGRAT 1.0      CBC w/Diff Recent Labs     07/15/19  1526   WBC 9.8   RBC 2.88*   HGB 10.3*   HCT 29.1*      GRANS 81*   LYMPH 13*   EOS 0      Coagulation No results for input(s): PTP, INR, APTT in the last 72 hours. No lab exists for component: INREXT    Iron/Ferritin No results for input(s): IRON in the last 72 hours. No lab exists for component: TIBCCALC   BNP No results for input(s): BNPP in the last 72 hours. Cardiac Enzymes Recent Labs     07/15/19  1526         Liver Enzymes Recent Labs     07/15/19  1526   TP 8.3*   ALB 4.1      SGOT 150*      Thyroid Studies Lab Results   Component Value Date/Time    TSH 2.39 11/07/2013 11:45 AM           IMAGES: CXR WNL  EKG WNL    CULTURE: Blood and urine c/s pend  U/A noted       Physical Assessment:     Visit Vitals  /62   Pulse (!) 114   Temp 96.8 °F (36 °C)   Resp (!) 32   Ht 5' 1\" (1.549 m)   Wt 75.8 kg (167 lb)   SpO2 97%   BMI 31.55 kg/m²     Last 3 Recorded Weights in this Encounter    07/15/19 1455   Weight: 75.8 kg (167 lb)       Intake/Output Summary (Last 24 hours) at 7/15/2019 1911  Last data filed at 7/15/2019 1840  Gross per 24 hour   Intake    Output 650 ml   Net -650 ml       Physial Exam:  General appearance: alert, cooperative, no distress, appears stated age  Skin: normal coloration and turgor, no rashes, no suspicious skin lesions noted. HEENT: Head; normocephalic, atraumatic. PAULINO. ENT- ENT exam normal, no neck nodes or sinus tenderness.    Lungs: clear to auscultation bilaterally  Heart: regular rate and rhythm, S1, S2 normal, no murmur, click, rub or gallop  Abdomen: soft, non-tender. Bowel sounds normal. No masses,  no organomegaly  Extremities: extremities normal, atraumatic, no cyanosis or edema    IMPRESSION AND PLAN:   SUYAPA non oliguric 2 to prerenal azotemia from severe dehydration. Cont with IVF and trend I/O and renal indices. No acute indication for dialysis. Avoid nephrotoxic meds and adjust all meds to renal function. Get renal U/S in am  Hypotension better cont IVF and hold diuretics  Hyponatremia, Hypochloremia most likely chronic  from volume and salt losses. Check repeat, cont NS, Avoid rapid correction. Hypokalemia replace IV, check other lytes, mag, phos  Anemia check repeat, no obvious blood loss. Elevated LFTs and lipase ? ? Alcohol, liver hypoperfusion, infection. Defer workup to primary team   Discuss with Dr Radha Yoon. Thank you will follow with you.     Maninder Hussein MD  July 15, 2019 Mohs Rapid Report Verbiage: The area of clinically evident tumor was marked with skin marking ink and appropriately hatched.  The initial incision was made following the Mohs approach through the skin.  The specimen was taken to the lab, divided into the necessary number of pieces, chromacoded and processed according to the Mohs protocol.  This was repeated in successive stages until a tumor free defect was achieved.

## 2024-04-01 NOTE — PROGRESS NOTES
The pt was seen in psych rounds today, her case was discussed with staff. During session today, the pt remains irritable and demanding at times. Treatment for her hypercholesterolemia with pravastatin was added. 24-Hr Vitals/Weight (last day)     Date/Time Temp Pulse BP MAP (Calculated) BP 1 Location BP Patient Position Resp SpO2 O2 Device O2 Flow Rate (L/min) Level of Consciousness MEWS Score Weight   10/06/19 0754 96.5 °F (35.8 °C) 74 140/98Abnormal  112 Right arm Sitting 16    Alert 1    10/05/19 0927 98 °F (36.7 °C) 76 145/92Abnormal  110 Right arm Sitting 18    Alert 1      BP remains high, her antihypertensives doses may have to be changed. Otherwise, she described better tolerance to her citalopram being dispensed at bed time. Will see again tomorrow. (V5) oriented